# Patient Record
Sex: MALE | Race: WHITE | NOT HISPANIC OR LATINO | URBAN - METROPOLITAN AREA
[De-identification: names, ages, dates, MRNs, and addresses within clinical notes are randomized per-mention and may not be internally consistent; named-entity substitution may affect disease eponyms.]

---

## 2019-08-23 ENCOUNTER — EMERGENCY (EMERGENCY)
Facility: HOSPITAL | Age: 64
LOS: 1 days | Discharge: HOME | End: 2019-08-23
Attending: EMERGENCY MEDICINE | Admitting: EMERGENCY MEDICINE
Payer: MEDICARE

## 2019-08-23 VITALS
TEMPERATURE: 98 F | HEART RATE: 71 BPM | WEIGHT: 149.91 LBS | OXYGEN SATURATION: 93 % | RESPIRATION RATE: 16 BRPM | SYSTOLIC BLOOD PRESSURE: 113 MMHG | DIASTOLIC BLOOD PRESSURE: 74 MMHG

## 2019-08-23 PROCEDURE — 97597 DBRDMT OPN WND 1ST 20 CM/<: CPT

## 2019-08-23 PROCEDURE — 99283 EMERGENCY DEPT VISIT LOW MDM: CPT | Mod: 25,GC

## 2019-08-23 NOTE — ED PROVIDER NOTE - NSFOLLOWUPINSTRUCTIONS_ED_ALL_ED_FT
WASH YOUR HANDS NORMALLY WITH SOAP AND WATER    YOU CAN COVER WITH A BANDAID IF YOU WISH    YOU CAN CONTINUE TO USE THE MUPIROCIN YOU HAVE AT HOME OVER THE WOUND IF YOU WISH

## 2019-08-23 NOTE — ED PROCEDURE NOTE - PROCEDURE ADDITIONAL DETAILS
Performed a surgical debridement of a skin flap on the Right 2nd digit. No sutures placed. Pt tolerated procedure well.

## 2019-08-23 NOTE — ED PROVIDER NOTE - ATTENDING CONTRIBUTION TO CARE
63y m pw skin laceration s/p breaking glass yesterday.     afebrile, vss    tetanus utd  exam: several superficial abrasions with skin tear down 2nd digit of R hand, and one skin tear over the 2nd digit knuckle. FROM, distal nvi.    imp: superficial abrasions/skin tear  plan: surgical debridement, wound dressing.

## 2019-08-23 NOTE — ED PROVIDER NOTE - CLINICAL SUMMARY MEDICAL DECISION MAKING FREE TEXT BOX
63y m pw skin laceration s/p breaking glass yesterday. Pt has several superficial cuts down 2nd digit of R hand, and one skin tear over the 2nd digit knuckle. Pt still has full ROM and full strength. Will remove dangling flap from skin tear, and dc home w/ return precautions. Pt UTD on tetanus -Jacinto

## 2019-08-23 NOTE — ED PROVIDER NOTE - OBJECTIVE STATEMENT
63y m pw skin laceration s/p breaking glass yesterday. Pt has several superficial cuts down 2nd digit of R hand, and one skin tear over the 2nd digit knuckle. Pt still has full ROM and full strength.
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
## 2019-08-29 DIAGNOSIS — S61.210A LACERATION WITHOUT FOREIGN BODY OF RIGHT INDEX FINGER WITHOUT DAMAGE TO NAIL, INITIAL ENCOUNTER: ICD-10-CM
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
## 2019-08-29 DIAGNOSIS — Z88.0 ALLERGY STATUS TO PENICILLIN: ICD-10-CM

## 2022-03-17 NOTE — ED PROCEDURE NOTE - CPROC ED TIME OUT STATEMENT1
“Patient's name, , procedure and correct site were confirmed during the [facility] Timeout.” Attending Attestation (For Attendings USE Only)...

## 2022-04-04 ENCOUNTER — NON-APPOINTMENT (OUTPATIENT)
Age: 67
End: 2022-04-04

## 2022-04-04 PROBLEM — Z00.00 ENCOUNTER FOR PREVENTIVE HEALTH EXAMINATION: Status: ACTIVE | Noted: 2022-04-04

## 2022-04-05 ENCOUNTER — APPOINTMENT (OUTPATIENT)
Dept: OTOLARYNGOLOGY | Facility: CLINIC | Age: 67
End: 2022-04-05
Payer: MEDICARE

## 2022-04-05 ENCOUNTER — NON-APPOINTMENT (OUTPATIENT)
Age: 67
End: 2022-04-05

## 2022-04-05 VITALS
TEMPERATURE: 98 F | HEIGHT: 64 IN | SYSTOLIC BLOOD PRESSURE: 114 MMHG | HEART RATE: 69 BPM | WEIGHT: 142 LBS | DIASTOLIC BLOOD PRESSURE: 75 MMHG | OXYGEN SATURATION: 96 % | BODY MASS INDEX: 24.24 KG/M2 | RESPIRATION RATE: 16 BRPM

## 2022-04-05 DIAGNOSIS — Z78.9 OTHER SPECIFIED HEALTH STATUS: ICD-10-CM

## 2022-04-05 DIAGNOSIS — H93.8X3 OTHER SPECIFIED DISORDERS OF EAR, BILATERAL: ICD-10-CM

## 2022-04-05 DIAGNOSIS — Z85.46 PERSONAL HISTORY OF MALIGNANT NEOPLASM OF PROSTATE: ICD-10-CM

## 2022-04-05 DIAGNOSIS — H92.03 OTALGIA, BILATERAL: ICD-10-CM

## 2022-04-05 DIAGNOSIS — H61.23 IMPACTED CERUMEN, BILATERAL: ICD-10-CM

## 2022-04-05 PROCEDURE — 92550 TYMPANOMETRY & REFLEX THRESH: CPT

## 2022-04-05 PROCEDURE — 92557 COMPREHENSIVE HEARING TEST: CPT

## 2022-04-05 PROCEDURE — 99203 OFFICE O/P NEW LOW 30 MIN: CPT

## 2022-04-05 PROCEDURE — 69210 REMOVE IMPACTED EAR WAX UNI: CPT

## 2022-04-05 RX ORDER — FLUOXETINE HYDROCHLORIDE 40 MG/1
CAPSULE ORAL
Refills: 0 | Status: ACTIVE | COMMUNITY

## 2022-04-08 NOTE — HISTORY OF PRESENT ILLNESS
[de-identified] : 66 years old male patient with history of Persistent bilateral  clogged ears for the past couple of months.  Patient is present today in the office with Bilateral cerumen impaction.

## 2022-04-08 NOTE — PROCEDURE
[Cerumen Impaction] : Cerumen Impaction [] : Removal of Cerumen [FreeTextEntry5] : Payne suction # 5, Ear curette, Ear Alligator

## 2022-04-08 NOTE — PHYSICAL EXAM
[Normal] : mucosa is normal [Midline] : trachea located in midline position [de-identified] : Bilateral cerumen impaction.

## 2022-04-08 NOTE — REASON FOR VISIT
[Initial Evaluation] : an initial evaluation for [FreeTextEntry2] : Persistent bilateral  clogged ears for the past couple of months.  Patient states his level of severity is a level 8 out of 10 and it occurs constant.  Patient states nothing helps to improve or worsens his Persistent bilateral  clogged ears for the past couple of months

## 2022-04-08 NOTE — CONSULT LETTER
[Dear  ___] : Dear  [unfilled], [Consult Letter:] : I had the pleasure of evaluating your patient, [unfilled]. [Please see my note below.] : Please see my note below. [Consult Closing:] : Thank you very much for allowing me to participate in the care of this patient.  If you have any questions, please do not hesitate to contact me. [Sincerely,] : Sincerely, [FreeTextEntry3] : Henri Welch MD, FACS\par Professor of Otolaryngology, [facility] [employer] [employer] at [facility]\par Director, [facility], Department of Otolaryngology, [facility]\par , Head & Neck Service Line, [facility]\par

## 2023-01-19 ENCOUNTER — EMERGENCY (EMERGENCY)
Facility: HOSPITAL | Age: 68
LOS: 1 days | Discharge: HOME | End: 2023-01-19
Attending: EMERGENCY MEDICINE | Admitting: EMERGENCY MEDICINE
Payer: MEDICARE

## 2023-01-19 VITALS
SYSTOLIC BLOOD PRESSURE: 130 MMHG | DIASTOLIC BLOOD PRESSURE: 72 MMHG | HEART RATE: 70 BPM | RESPIRATION RATE: 16 BRPM | TEMPERATURE: 98 F | OXYGEN SATURATION: 98 %

## 2023-01-19 VITALS
RESPIRATION RATE: 16 BRPM | DIASTOLIC BLOOD PRESSURE: 67 MMHG | SYSTOLIC BLOOD PRESSURE: 118 MMHG | HEIGHT: 64 IN | OXYGEN SATURATION: 100 % | WEIGHT: 149.03 LBS | HEART RATE: 71 BPM | TEMPERATURE: 100 F

## 2023-01-19 LAB
ALBUMIN SERPL ELPH-MCNC: 4.3 G/DL — SIGNIFICANT CHANGE UP (ref 3.4–5)
ALP SERPL-CCNC: 122 U/L — HIGH (ref 40–120)
ALT FLD-CCNC: 26 U/L — SIGNIFICANT CHANGE UP (ref 12–42)
ANION GAP SERPL CALC-SCNC: 9 MMOL/L — SIGNIFICANT CHANGE UP (ref 9–16)
APPEARANCE UR: CLEAR — SIGNIFICANT CHANGE UP
APTT BLD: 34.3 SEC — SIGNIFICANT CHANGE UP (ref 27.5–35.5)
AST SERPL-CCNC: 22 U/L — SIGNIFICANT CHANGE UP (ref 15–37)
BASOPHILS # BLD AUTO: 0 K/UL — SIGNIFICANT CHANGE UP (ref 0–0.2)
BASOPHILS NFR BLD AUTO: 0 % — SIGNIFICANT CHANGE UP (ref 0–2)
BILIRUB SERPL-MCNC: 0.3 MG/DL — SIGNIFICANT CHANGE UP (ref 0.2–1.2)
BILIRUB UR-MCNC: NEGATIVE — SIGNIFICANT CHANGE UP
BUN SERPL-MCNC: 14 MG/DL — SIGNIFICANT CHANGE UP (ref 7–23)
CALCIUM SERPL-MCNC: 8.9 MG/DL — SIGNIFICANT CHANGE UP (ref 8.5–10.5)
CHLORIDE SERPL-SCNC: 94 MMOL/L — LOW (ref 96–108)
CO2 SERPL-SCNC: 28 MMOL/L — SIGNIFICANT CHANGE UP (ref 22–31)
COLOR SPEC: YELLOW — SIGNIFICANT CHANGE UP
CREAT SERPL-MCNC: 0.91 MG/DL — SIGNIFICANT CHANGE UP (ref 0.5–1.3)
DIFF PNL FLD: NEGATIVE — SIGNIFICANT CHANGE UP
EGFR: 91 ML/MIN/1.73M2 — SIGNIFICANT CHANGE UP
EOSINOPHIL # BLD AUTO: 0.1 K/UL — SIGNIFICANT CHANGE UP (ref 0–0.5)
EOSINOPHIL NFR BLD AUTO: 1 % — SIGNIFICANT CHANGE UP (ref 0–6)
FLUAV AG NPH QL: SIGNIFICANT CHANGE UP
FLUAV H1 2009 PAND RNA SPEC QL NAA+PROBE: SIGNIFICANT CHANGE UP
FLUAV H1 RNA SPEC QL NAA+PROBE: SIGNIFICANT CHANGE UP
FLUAV H3 RNA SPEC QL NAA+PROBE: SIGNIFICANT CHANGE UP
FLUAV SUBTYP SPEC NAA+PROBE: SIGNIFICANT CHANGE UP
FLUBV AG NPH QL: SIGNIFICANT CHANGE UP
FLUBV RNA SPEC QL NAA+PROBE: SIGNIFICANT CHANGE UP
GLUCOSE SERPL-MCNC: 114 MG/DL — HIGH (ref 70–99)
GLUCOSE UR QL: NEGATIVE — SIGNIFICANT CHANGE UP
HCOV PNL SPEC NAA+PROBE: DETECTED
HCT VFR BLD CALC: 39.7 % — SIGNIFICANT CHANGE UP (ref 39–50)
HGB BLD-MCNC: 13.5 G/DL — SIGNIFICANT CHANGE UP (ref 13–17)
HIV 1 & 2 AB SERPL IA.RAPID: SIGNIFICANT CHANGE UP
INR BLD: 1.08 — SIGNIFICANT CHANGE UP (ref 0.88–1.16)
KETONES UR-MCNC: NEGATIVE — SIGNIFICANT CHANGE UP
LACTATE SERPL-SCNC: 0.9 MMOL/L — SIGNIFICANT CHANGE UP (ref 0.4–2)
LEUKOCYTE ESTERASE UR-ACNC: NEGATIVE — SIGNIFICANT CHANGE UP
LYMPHOCYTES # BLD AUTO: 0.79 K/UL — LOW (ref 1–3.3)
LYMPHOCYTES # BLD AUTO: 8 % — LOW (ref 13–44)
MANUAL SMEAR VERIFICATION: SIGNIFICANT CHANGE UP
MCHC RBC-ENTMCNC: 29.2 PG — SIGNIFICANT CHANGE UP (ref 27–34)
MCHC RBC-ENTMCNC: 34 GM/DL — SIGNIFICANT CHANGE UP (ref 32–36)
MCV RBC AUTO: 85.7 FL — SIGNIFICANT CHANGE UP (ref 80–100)
MONOCYTES # BLD AUTO: 0.79 K/UL — SIGNIFICANT CHANGE UP (ref 0–0.9)
MONOCYTES NFR BLD AUTO: 8 % — SIGNIFICANT CHANGE UP (ref 2–14)
NEUTROPHILS # BLD AUTO: 7.91 K/UL — HIGH (ref 1.8–7.4)
NEUTROPHILS NFR BLD AUTO: 78 % — HIGH (ref 43–77)
NEUTS BAND # BLD: 2 % — SIGNIFICANT CHANGE UP (ref 0–8)
NITRITE UR-MCNC: NEGATIVE — SIGNIFICANT CHANGE UP
NRBC # BLD: 0 /100 — SIGNIFICANT CHANGE UP (ref 0–0)
NRBC # BLD: SIGNIFICANT CHANGE UP /100 WBCS (ref 0–0)
NT-PROBNP SERPL-SCNC: 235 PG/ML — SIGNIFICANT CHANGE UP
PH UR: 8.5 — HIGH (ref 5–8)
PLAT MORPH BLD: NORMAL — SIGNIFICANT CHANGE UP
PLATELET # BLD AUTO: 224 K/UL — SIGNIFICANT CHANGE UP (ref 150–400)
POTASSIUM SERPL-MCNC: 4.6 MMOL/L — SIGNIFICANT CHANGE UP (ref 3.5–5.3)
POTASSIUM SERPL-SCNC: 4.6 MMOL/L — SIGNIFICANT CHANGE UP (ref 3.5–5.3)
PROT SERPL-MCNC: 7.4 G/DL — SIGNIFICANT CHANGE UP (ref 6.4–8.2)
PROT UR-MCNC: NEGATIVE MG/DL — SIGNIFICANT CHANGE UP
PROTHROM AB SERPL-ACNC: 12.5 SEC — SIGNIFICANT CHANGE UP (ref 10.5–13.4)
RAPID RVP RESULT: DETECTED
RBC # BLD: 4.63 M/UL — SIGNIFICANT CHANGE UP (ref 4.2–5.8)
RBC # FLD: 13.1 % — SIGNIFICANT CHANGE UP (ref 10.3–14.5)
RBC BLD AUTO: NORMAL — SIGNIFICANT CHANGE UP
RSV RNA NPH QL NAA+NON-PROBE: SIGNIFICANT CHANGE UP
S PYO AG SPEC QL IA: NEGATIVE — SIGNIFICANT CHANGE UP
SARS-COV-2 RNA SPEC QL NAA+PROBE: SIGNIFICANT CHANGE UP
SARS-COV-2 RNA SPEC QL NAA+PROBE: SIGNIFICANT CHANGE UP
SODIUM SERPL-SCNC: 131 MMOL/L — LOW (ref 132–145)
SP GR SPEC: 1.01 — SIGNIFICANT CHANGE UP (ref 1–1.03)
TROPONIN I, HIGH SENSITIVITY RESULT: 7.1 NG/L — SIGNIFICANT CHANGE UP
UROBILINOGEN FLD QL: 0.2 E.U./DL — SIGNIFICANT CHANGE UP
VARIANT LYMPHS # BLD: 3 % — SIGNIFICANT CHANGE UP (ref 0–6)
WBC # BLD: 9.89 K/UL — SIGNIFICANT CHANGE UP (ref 3.8–10.5)
WBC # FLD AUTO: 9.89 K/UL — SIGNIFICANT CHANGE UP (ref 3.8–10.5)

## 2023-01-19 PROCEDURE — 71045 X-RAY EXAM CHEST 1 VIEW: CPT | Mod: 26

## 2023-01-19 PROCEDURE — 99285 EMERGENCY DEPT VISIT HI MDM: CPT | Mod: CS

## 2023-01-19 RX ORDER — CEFTRIAXONE 500 MG/1
1000 INJECTION, POWDER, FOR SOLUTION INTRAMUSCULAR; INTRAVENOUS ONCE
Refills: 0 | Status: COMPLETED | OUTPATIENT
Start: 2023-01-19 | End: 2023-01-19

## 2023-01-19 RX ORDER — AZITHROMYCIN 500 MG/1
1 TABLET, FILM COATED ORAL
Qty: 4 | Refills: 0
Start: 2023-01-19 | End: 2023-01-22

## 2023-01-19 RX ORDER — AZITHROMYCIN 500 MG/1
500 TABLET, FILM COATED ORAL ONCE
Refills: 0 | Status: COMPLETED | OUTPATIENT
Start: 2023-01-19 | End: 2023-01-19

## 2023-01-19 RX ORDER — ACETAMINOPHEN 500 MG
650 TABLET ORAL ONCE
Refills: 0 | Status: COMPLETED | OUTPATIENT
Start: 2023-01-19 | End: 2023-01-19

## 2023-01-19 RX ORDER — SODIUM CHLORIDE 9 MG/ML
1850 INJECTION INTRAMUSCULAR; INTRAVENOUS; SUBCUTANEOUS ONCE
Refills: 0 | Status: COMPLETED | OUTPATIENT
Start: 2023-01-19 | End: 2023-01-19

## 2023-01-19 RX ADMIN — SODIUM CHLORIDE 1850 MILLILITER(S): 9 INJECTION INTRAMUSCULAR; INTRAVENOUS; SUBCUTANEOUS at 11:12

## 2023-01-19 RX ADMIN — Medication 650 MILLIGRAM(S): at 12:40

## 2023-01-19 RX ADMIN — AZITHROMYCIN 255 MILLIGRAM(S): 500 TABLET, FILM COATED ORAL at 11:12

## 2023-01-19 RX ADMIN — Medication 650 MILLIGRAM(S): at 11:11

## 2023-01-19 NOTE — ED PROVIDER NOTE - IV ALTEPLASE ADMIN OUTSIDE HIDDEN
- Patient presented with  elevated creatinine level 1.51 ,   - Baseline creatine - 1.5. as per records from [facility]. show

## 2023-01-19 NOTE — ED ADULT TRIAGE NOTE - CHIEF COMPLAINT QUOTE
Pt walked in with c/o fever since this morning. Reports sore throat since yesterday. Breathing even and unlabored.

## 2023-01-19 NOTE — ED PROVIDER NOTE - OBJECTIVE STATEMENT
66 y/o M with PMH of prostate CA presents to the ED for evaluation. Pt reports he developed a sore throat yesterday. This morning, he measured a fever of T-max 102. He endorses coughs and rhinorrhea. Pt denies CP or shortness of breath. Of note, Pt has allergies to Penicillin.

## 2023-01-19 NOTE — ED PROVIDER NOTE - NSFOLLOWUPINSTRUCTIONS_ED_ALL_ED_FT
Please follow up with your primary care doctor.  Please return at any time if you have any concerns.  Your blood and viral swab and Xray test results are attached.

## 2023-01-19 NOTE — ED ADULT NURSE REASSESSMENT NOTE - NS ED NURSE REASSESS COMMENT FT1
Patient states that the is feeling better, patient is able to swallow without too much difficulty compared to initial presentation.

## 2023-01-19 NOTE — ED PROVIDER NOTE - CLINICAL SUMMARY MEDICAL DECISION MAKING FREE TEXT BOX
66 y/o M presenting with fevers, coughs, and sore throat. On exam, Pt appears well and in no acute distress. Plan for EKG, Chest XR, blood work, and Levaquin. Will reassess clinically after results have been obtained.

## 2023-01-19 NOTE — ED ADULT NURSE NOTE - OBJECTIVE STATEMENT
Patient states that two days prior to they were having trouble swallowing, it improved yesterday, today the patients states that they are having trouble swallowing. Patient states that they have been vaccinated for the Flu this year. Patient is AOx4, ambulates with a steady gait.

## 2023-01-19 NOTE — ED PROVIDER NOTE - PATIENT PORTAL LINK FT
You can access the FollowMyHealth Patient Portal offered by [facility] by registering at the following website: [email or web address]. By joining [employer]’s FollowMyHealth portal, you will also be able to view your health information using other applications (apps) compatible with our system.

## 2023-01-20 LAB
CULTURE RESULTS: SIGNIFICANT CHANGE UP
SPECIMEN SOURCE: SIGNIFICANT CHANGE UP

## 2023-01-21 LAB
CULTURE RESULTS: SIGNIFICANT CHANGE UP
SPECIMEN SOURCE: SIGNIFICANT CHANGE UP

## 2023-01-22 DIAGNOSIS — R05.9 COUGH, UNSPECIFIED: ICD-10-CM

## 2023-01-22 DIAGNOSIS — Z88.0 ALLERGY STATUS TO PENICILLIN: ICD-10-CM

## 2023-01-22 DIAGNOSIS — J02.9 ACUTE PHARYNGITIS, UNSPECIFIED: ICD-10-CM

## 2023-01-22 DIAGNOSIS — Z20.822 CONTACT WITH AND (SUSPECTED) EXPOSURE TO COVID-19: ICD-10-CM

## 2023-01-22 DIAGNOSIS — Z85.46 PERSONAL HISTORY OF MALIGNANT NEOPLASM OF PROSTATE: ICD-10-CM

## 2023-01-22 LAB
-  FUSOBACTERIUM NUCLEATUM: SIGNIFICANT CHANGE UP
GRAM STN FLD: SIGNIFICANT CHANGE UP
METHOD TYPE: SIGNIFICANT CHANGE UP

## 2023-01-22 NOTE — ED POST DISCHARGE NOTE - DETAILS
left message to callback. contacted on 1/23/2023 patient to return to ED for repeat testing. patient returned for repeat blood cultures, neg to date, pending final results.

## 2023-01-23 ENCOUNTER — EMERGENCY (EMERGENCY)
Facility: HOSPITAL | Age: 68
LOS: 1 days | Discharge: HOME | End: 2023-01-23
Admitting: EMERGENCY MEDICINE
Payer: MEDICARE

## 2023-01-23 VITALS
DIASTOLIC BLOOD PRESSURE: 75 MMHG | SYSTOLIC BLOOD PRESSURE: 122 MMHG | TEMPERATURE: 98 F | RESPIRATION RATE: 17 BRPM | WEIGHT: 145.95 LBS | HEIGHT: 64 IN | HEART RATE: 68 BPM | OXYGEN SATURATION: 98 %

## 2023-01-23 PROBLEM — C61 MALIGNANT NEOPLASM OF PROSTATE: Chronic | Status: ACTIVE | Noted: 2023-01-19

## 2023-01-23 LAB
ALBUMIN SERPL ELPH-MCNC: 4.2 G/DL — SIGNIFICANT CHANGE UP (ref 3.4–5)
ALP SERPL-CCNC: 111 U/L — SIGNIFICANT CHANGE UP (ref 40–120)
ALT FLD-CCNC: 30 U/L — SIGNIFICANT CHANGE UP (ref 12–42)
ANION GAP SERPL CALC-SCNC: 8 MMOL/L — LOW (ref 9–16)
AST SERPL-CCNC: 27 U/L — SIGNIFICANT CHANGE UP (ref 15–37)
BASOPHILS # BLD AUTO: 0.05 K/UL — SIGNIFICANT CHANGE UP (ref 0–0.2)
BASOPHILS NFR BLD AUTO: 0.6 % — SIGNIFICANT CHANGE UP (ref 0–2)
BILIRUB SERPL-MCNC: 0.3 MG/DL — SIGNIFICANT CHANGE UP (ref 0.2–1.2)
BUN SERPL-MCNC: 16 MG/DL — SIGNIFICANT CHANGE UP (ref 7–23)
CALCIUM SERPL-MCNC: 9.3 MG/DL — SIGNIFICANT CHANGE UP (ref 8.5–10.5)
CHLORIDE SERPL-SCNC: 93 MMOL/L — LOW (ref 96–108)
CO2 SERPL-SCNC: 30 MMOL/L — SIGNIFICANT CHANGE UP (ref 22–31)
CREAT SERPL-MCNC: 0.98 MG/DL — SIGNIFICANT CHANGE UP (ref 0.5–1.3)
EGFR: 83 ML/MIN/1.73M2 — SIGNIFICANT CHANGE UP
EOSINOPHIL # BLD AUTO: 0.15 K/UL — SIGNIFICANT CHANGE UP (ref 0–0.5)
EOSINOPHIL NFR BLD AUTO: 1.8 % — SIGNIFICANT CHANGE UP (ref 0–6)
GLUCOSE SERPL-MCNC: 92 MG/DL — SIGNIFICANT CHANGE UP (ref 70–99)
HCT VFR BLD CALC: 39.5 % — SIGNIFICANT CHANGE UP (ref 39–50)
HGB BLD-MCNC: 13.6 G/DL — SIGNIFICANT CHANGE UP (ref 13–17)
IMM GRANULOCYTES NFR BLD AUTO: 0.6 % — SIGNIFICANT CHANGE UP (ref 0–0.9)
LYMPHOCYTES # BLD AUTO: 1.31 K/UL — SIGNIFICANT CHANGE UP (ref 1–3.3)
LYMPHOCYTES # BLD AUTO: 15.4 % — SIGNIFICANT CHANGE UP (ref 13–44)
MCHC RBC-ENTMCNC: 29.4 PG — SIGNIFICANT CHANGE UP (ref 27–34)
MCHC RBC-ENTMCNC: 34.4 GM/DL — SIGNIFICANT CHANGE UP (ref 32–36)
MCV RBC AUTO: 85.3 FL — SIGNIFICANT CHANGE UP (ref 80–100)
MONOCYTES # BLD AUTO: 1.22 K/UL — HIGH (ref 0–0.9)
MONOCYTES NFR BLD AUTO: 14.4 % — HIGH (ref 2–14)
NEUTROPHILS # BLD AUTO: 5.71 K/UL — SIGNIFICANT CHANGE UP (ref 1.8–7.4)
NEUTROPHILS NFR BLD AUTO: 67.2 % — SIGNIFICANT CHANGE UP (ref 43–77)
NRBC # BLD: 0 /100 WBCS — SIGNIFICANT CHANGE UP (ref 0–0)
PLATELET # BLD AUTO: 229 K/UL — SIGNIFICANT CHANGE UP (ref 150–400)
POTASSIUM SERPL-MCNC: 4.9 MMOL/L — SIGNIFICANT CHANGE UP (ref 3.5–5.3)
POTASSIUM SERPL-SCNC: 4.9 MMOL/L — SIGNIFICANT CHANGE UP (ref 3.5–5.3)
PROT SERPL-MCNC: 7.4 G/DL — SIGNIFICANT CHANGE UP (ref 6.4–8.2)
RBC # BLD: 4.63 M/UL — SIGNIFICANT CHANGE UP (ref 4.2–5.8)
RBC # FLD: 12.8 % — SIGNIFICANT CHANGE UP (ref 10.3–14.5)
SODIUM SERPL-SCNC: 131 MMOL/L — LOW (ref 132–145)
WBC # BLD: 8.49 K/UL — SIGNIFICANT CHANGE UP (ref 3.8–10.5)
WBC # FLD AUTO: 8.49 K/UL — SIGNIFICANT CHANGE UP (ref 3.8–10.5)

## 2023-01-23 PROCEDURE — 99284 EMERGENCY DEPT VISIT MOD MDM: CPT

## 2023-01-23 RX ORDER — LEVOFLOXACIN 5 MG/ML
1 INJECTION, SOLUTION INTRAVENOUS
Qty: 10 | Refills: 0
Start: 2023-01-23 | End: 2023-02-01

## 2023-01-23 NOTE — ED PROVIDER NOTE - PATIENT PORTAL LINK FT
You can access the FollowMyHealth Patient Portal offered by [facility] by registering at the following website: [email or web address]. By joining [employer]’s FollowMyHealth portal, you will also be able to view your health information using other applications (apps) compatible with our system.

## 2023-01-23 NOTE — ED PROVIDER NOTE - OBJECTIVE STATEMENT
66 y/o M with PMH of prostate CA presents to the ED for re-evaluation. Pt was seen at this ED a few days ago for URI symptoms. Pt received a call back regarding an abnormal result in his blood work. Pt returned to the ED as instructed. He states his URI symptoms are improving and only endorses fatigue.

## 2023-01-23 NOTE — ED PROVIDER NOTE - CLINICAL SUMMARY MEDICAL DECISION MAKING FREE TEXT BOX
68 y/o M presents to the ED for repeat blood work. Will repeat CBC and blood cultures. Pt states he is feeling better overall. URI symptoms are resolving. Will continue to monitor.

## 2023-01-23 NOTE — ED PROVIDER NOTE - CARDIAC, MLM
Fanta stated if Patient is a Type2 diabetic, insurance won't cover them for this if it is.  Please call Fanta if he is a type 1.  Otherwise she will disregard request.  She is sorry but cannot process paperwork.  Thank you.   Normal rate, regular rhythm.  Heart sounds S1, S2.  No murmurs, rubs or gallops.

## 2023-01-24 LAB
CULTURE RESULTS: SIGNIFICANT CHANGE UP
CULTURE RESULTS: SIGNIFICANT CHANGE UP
ORGANISM # SPEC MICROSCOPIC CNT: SIGNIFICANT CHANGE UP
ORGANISM # SPEC MICROSCOPIC CNT: SIGNIFICANT CHANGE UP
SPECIMEN SOURCE: SIGNIFICANT CHANGE UP
SPECIMEN SOURCE: SIGNIFICANT CHANGE UP

## 2023-01-25 DIAGNOSIS — Z85.46 PERSONAL HISTORY OF MALIGNANT NEOPLASM OF PROSTATE: ICD-10-CM

## 2023-01-25 DIAGNOSIS — R53.83 OTHER FATIGUE: ICD-10-CM

## 2023-01-25 DIAGNOSIS — Z88.0 ALLERGY STATUS TO PENICILLIN: ICD-10-CM

## 2023-01-25 DIAGNOSIS — Z01.89 ENCOUNTER FOR OTHER SPECIFIED SPECIAL EXAMINATIONS: ICD-10-CM

## 2023-01-29 LAB
CULTURE RESULTS: SIGNIFICANT CHANGE UP
CULTURE RESULTS: SIGNIFICANT CHANGE UP
SPECIMEN SOURCE: SIGNIFICANT CHANGE UP
SPECIMEN SOURCE: SIGNIFICANT CHANGE UP

## 2023-03-30 ENCOUNTER — EMERGENCY (EMERGENCY)
Facility: HOSPITAL | Age: 68
LOS: 1 days | Discharge: HOME | End: 2023-03-30
Attending: EMERGENCY MEDICINE | Admitting: EMERGENCY MEDICINE
Payer: MEDICARE

## 2023-03-30 VITALS
WEIGHT: 149.91 LBS | TEMPERATURE: 98 F | DIASTOLIC BLOOD PRESSURE: 80 MMHG | OXYGEN SATURATION: 98 % | HEART RATE: 77 BPM | RESPIRATION RATE: 17 BRPM | SYSTOLIC BLOOD PRESSURE: 152 MMHG

## 2023-03-30 DIAGNOSIS — S32.502A UNSPECIFIED FRACTURE OF LEFT PUBIS, INITIAL ENCOUNTER FOR CLOSED FRACTURE: ICD-10-CM

## 2023-03-30 DIAGNOSIS — Z88.0 ALLERGY STATUS TO PENICILLIN: ICD-10-CM

## 2023-03-30 DIAGNOSIS — M25.552 PAIN IN LEFT HIP: ICD-10-CM
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
## 2023-03-30 DIAGNOSIS — Z85.46 PERSONAL HISTORY OF MALIGNANT NEOPLASM OF PROSTATE: ICD-10-CM
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
## 2023-03-30 PROCEDURE — 72192 CT PELVIS W/O DYE: CPT | Mod: 26,MH

## 2023-03-30 PROCEDURE — 99284 EMERGENCY DEPT VISIT MOD MDM: CPT

## 2023-03-30 PROCEDURE — 73502 X-RAY EXAM HIP UNI 2-3 VIEWS: CPT | Mod: 26,LT

## 2023-04-11 ENCOUNTER — EMERGENCY (EMERGENCY)
Facility: HOSPITAL | Age: 68
LOS: 1 days | Discharge: HOME | End: 2023-04-11
Attending: EMERGENCY MEDICINE | Admitting: EMERGENCY MEDICINE
Payer: MEDICARE

## 2023-04-11 VITALS
TEMPERATURE: 99 F | SYSTOLIC BLOOD PRESSURE: 125 MMHG | RESPIRATION RATE: 18 BRPM | HEART RATE: 70 BPM | OXYGEN SATURATION: 95 % | DIASTOLIC BLOOD PRESSURE: 72 MMHG

## 2023-04-11 VITALS
TEMPERATURE: 98 F | SYSTOLIC BLOOD PRESSURE: 161 MMHG | DIASTOLIC BLOOD PRESSURE: 69 MMHG | OXYGEN SATURATION: 99 % | HEART RATE: 63 BPM | RESPIRATION RATE: 17 BRPM

## 2023-04-11 DIAGNOSIS — Z88.0 ALLERGY STATUS TO PENICILLIN: ICD-10-CM

## 2023-04-11 DIAGNOSIS — Z85.46 PERSONAL HISTORY OF MALIGNANT NEOPLASM OF PROSTATE: ICD-10-CM

## 2023-04-11 DIAGNOSIS — R10.2 PELVIC AND PERINEAL PAIN: ICD-10-CM

## 2023-04-11 DIAGNOSIS — M84.48XA PATHOLOGICAL FRACTURE, OTHER SITE, INITIAL ENCOUNTER FOR FRACTURE: ICD-10-CM

## 2023-04-11 PROCEDURE — 99284 EMERGENCY DEPT VISIT MOD MDM: CPT

## 2023-04-11 PROCEDURE — 72192 CT PELVIS W/O DYE: CPT | Mod: 26,MH

## 2023-04-11 RX ORDER — IBUPROFEN 200 MG
800 TABLET ORAL ONCE
Refills: 0 | Status: COMPLETED | OUTPATIENT
Start: 2023-04-11 | End: 2023-04-11

## 2023-04-11 RX ORDER — MORPHINE SULFATE 50 MG/1
6 CAPSULE, EXTENDED RELEASE ORAL ONCE
Refills: 0 | Status: DISCONTINUED | OUTPATIENT
Start: 2023-04-11 | End: 2023-04-11

## 2023-04-11 RX ORDER — HYDROCODONE BITARTRATE AND ACETAMINOPHEN 7.5; 325 MG/15ML; MG/15ML
1 SOLUTION ORAL
Qty: 20 | Refills: 0
Start: 2023-04-11 | End: 2023-04-15

## 2023-04-11 RX ADMIN — MORPHINE SULFATE 6 MILLIGRAM(S): 50 CAPSULE, EXTENDED RELEASE ORAL at 14:26

## 2023-04-11 RX ADMIN — Medication 800 MILLIGRAM(S): at 14:23

## 2023-04-11 NOTE — ED PROVIDER NOTE - CLINICAL SUMMARY MEDICAL DECISION MAKING FREE TEXT BOX
Discussed CT findings of new sacral insufficiency fracture with orthopedics Dr. Dupree.  We had a brief discussion over the phone.  Patient can be decreased weightbearing as tolerated.  Will use crutches and walker at home.  Patient has his own orthopedic doctor to follow-up with.  Pain controlled in ED with morphine and ibuprofen.  Patient states that Percocet 10 mg does not help with his pain, will try hydrocodone instead.

## 2023-04-11 NOTE — ED PROVIDER NOTE - OBJECTIVE STATEMENT
67-year-old male with history of prostate cancer, seen here approximately 1 week ago after a fall.  Was diagnosed with left pubic ramus fracture and sent home with Percocet and ibuprofen for pain.  Patient has been ambulating as tolerated, after 3 to 4 days he felt strong, his pain was controlled with minimal pain medication at home and he had a day of walking and stretching and exercising.  2 to 3 days after this day he felt gradual onset of right-sided lower pelvic pain, difficulty with ambulating and pain control was poor despite Percocet x2 and ibuprofen 600 mg every 6 hours.  He states he is having difficulty ambulating on his own.  Patient lives in 1/5 floor walk up and needed crutches to get up to his apartment.  He was using a walker to get around his apartment but the walker broke.  No head trauma, no recurrence of fall, no new injuries.

## 2023-04-11 NOTE — ED ADULT NURSE NOTE - OBJECTIVE STATEMENT
Pt came in c/o of increasing hip pain x 1 week. Pt reports having a mechanical trip and fall last week where he fractured his left hip. Pt states he was provided instructions to walk more on the hip and put pressure on it. Pt reports walking a lot recently and developed more pain. PT denies head strike or loc on initial fall and denies new trauma to the hip. A&Ox3 speaking in full sentences.

## 2023-04-11 NOTE — ED ADULT TRIAGE NOTE - CHIEF COMPLAINT QUOTE
pt biba for exacerbation of left hip pain. pt was told that he had a stable fracture one week ago, which would not require surgical intervention.

## 2023-04-11 NOTE — ED PROVIDER NOTE - PATIENT PORTAL LINK FT
You can access the FollowMyHealth Patient Portal offered by [facility] by registering at the following website: [email or web address]. By joining [employer]’s FollowMyHealth portal, you will also be able to view your health information using other applications (apps) compatible with our system.

## 2023-04-11 NOTE — ED PROVIDER NOTE - PHYSICAL EXAMINATION
VSS in NAD non toxic appearing   NCAT EOMI PERRL OP clear  abd soft NT ND no CVAT no guarding no rebound   pelvis stable, FROM B/L hips   normal neuro exam CN I-XII grossly intact, no groos motor or sensory deficits  no peripheral c/c/e

## 2023-08-05 ENCOUNTER — EMERGENCY (EMERGENCY)
Facility: HOSPITAL | Age: 68
LOS: 1 days | Discharge: HOME | End: 2023-08-05
Attending: EMERGENCY MEDICINE | Admitting: EMERGENCY MEDICINE
Payer: MEDICARE

## 2023-08-05 VITALS
DIASTOLIC BLOOD PRESSURE: 69 MMHG | RESPIRATION RATE: 18 BRPM | HEART RATE: 68 BPM | OXYGEN SATURATION: 96 % | SYSTOLIC BLOOD PRESSURE: 108 MMHG | TEMPERATURE: 98 F | WEIGHT: 169.98 LBS

## 2023-08-05 PROCEDURE — 10061 I&D ABSCESS COMP/MULTIPLE: CPT

## 2023-08-05 PROCEDURE — 99283 EMERGENCY DEPT VISIT LOW MDM: CPT | Mod: 25

## 2023-08-05 RX ORDER — LIDOCAINE HYDROCHLORIDE AND EPINEPHRINE 10; 10 MG/ML; UG/ML
6 INJECTION, SOLUTION INFILTRATION; PERINEURAL ONCE
Refills: 0 | Status: COMPLETED | OUTPATIENT
Start: 2023-08-05 | End: 2023-08-05

## 2023-08-05 RX ADMIN — Medication 1 TABLET(S): at 16:37

## 2023-08-05 RX ADMIN — LIDOCAINE HYDROCHLORIDE AND EPINEPHRINE 6 MILLILITER(S): 10; 10 INJECTION, SOLUTION INFILTRATION; PERINEURAL at 16:53

## 2023-08-05 NOTE — ED PROVIDER NOTE - PATIENT PORTAL LINK FT
You can access the FollowMyHealth Patient Portal offered by [facility] by registering at the following website: [email or web address]. By joining [employer]’s FollowMyHealth portal, you will also be able to view your health information using other applications (apps) compatible with our system.

## 2023-08-05 NOTE — ED PROCEDURE NOTE - CPROC ED COMPLICATIONS1











Bonita Recinos MD Jan 6, 2018 18:18 no

## 2023-08-05 NOTE — ED PROVIDER NOTE - DISPOSITION TYPE
Pt  Was assessed and discharged by [facility]ING[facility] Pike RN  01/30/18 9423 DISCHARGE

## 2023-08-05 NOTE — ED PROVIDER NOTE - OBJECTIVE STATEMENT
66 yo male presents with shoulder wound x2 weeks. Pt denies any injury prior to noticing the wound and reports it has been unchanging for the past 2 weeks with reoccurring puss. He also denies history of diabetes or current antibiotic use.

## 2023-08-05 NOTE — ED PROVIDER NOTE - ATTESTATION, MLM
The procedure was performed independently
The procedure was performed independently
I have reviewed and confirmed nurses' notes for patient's medications, allergies, medical history, and surgical history.

## 2023-08-05 NOTE — ED ADULT NURSE NOTE - NSFALLRISKINTERV_ED_ALL_ED

## 2023-08-05 NOTE — ED PROVIDER NOTE - NSFOLLOWUPINSTRUCTIONS_ED_ALL_ED_FT
Abscess Incision and Drainage    WHAT YOU NEED TO KNOW:    An abscess incision and drainage (I and D) is a procedure to drain pus from an abscess and clean it out so it can heal.    DISCHARGE INSTRUCTIONS:    Contact your healthcare provider if:    The area around your abscess has red streaks or is warm and painful.    You have a fever or chills.    You have increased redness, swelling, or pain in your wound.    Your wound does not start to heal after a few days.    Your abscess returns.    You have questions or concerns about your condition or care.  Medicines:    NSAIDs, such as ibuprofen, help decrease swelling, pain, and fever. NSAIDs can cause stomach bleeding or kidney problems in certain people. If you take blood thinner medicine, always ask your healthcare provider if NSAIDs are safe for you. Always read the medicine label and follow directions.    Take your medicine as directed. Contact your healthcare provider if you think your medicine is not helping or if you have side effects. Tell your provider if you are allergic to any medicine. Keep a list of the medicines, vitamins, and herbs you take. Include the amounts, and when and why you take them. Bring the list or the pill bottles to follow-up visits. Carry your medicine list with you in case of an emergency.  Care for your wound as directed:    Do not remove your bandage unless your healthcare provider says it is okay. Keep the bandage clean and dry. Remove your bandage and clean the wound once your healthcare provider gives you directions.    Apply heat on the bandage over your wound for 20 to 30 minutes every 2 hours for as many days as directed. This will increase blood flow to the area and help it heal.    Elevate your wound above level of your heart as often as you can. This will help decrease swelling and pain. Prop your wounded area on pillows or blankets to keep it elevated comfortably.  Follow up with your healthcare provider as directed: You may need to return in 1 to 3 days to have the gauze in your wound removed and your wound examined. You may be taught how to change the gauze in your wound. Write down your questions so you remember to ask them during your visits.

## 2023-08-05 NOTE — ED PROVIDER NOTE - WET READ LAUNCH FT
[Total Preg ___] : : [unfilled] [Full Term ___] : [unfilled] (full-term) [Vaginal ___] : [unfilled] vaginal delivery(s) There are no Wet Read(s) to document.

## 2023-08-05 NOTE — ED PROVIDER NOTE - CLINICAL SUMMARY MEDICAL DECISION MAKING FREE TEXT BOX
68 yo male presents with 3x3 fluctuant mass over left shoulder with central scab and green discharge upon palpitation. Will prescribe Bactrim, perform I&D, and advise patient for reassessment in 3/4 days. 68 yo male presents with 3x3 fluctuant mass over left shoulder with central scab and green discharge upon palpitation. Will prescribe Bactrim, perform I&D, and advise patient for reassessment in 2-3 days back in ED for wound check.     I&D was performed, wound culture obtained. Educated on wound care instructions.

## 2023-08-05 NOTE — ED ADULT TRIAGE NOTE - CHIEF COMPLAINT QUOTE
wound check to left side forehead above left eye. Pt states it was a bump and he scratched in and it has not healed worried for inflection. pt is in and out of ortho rehab center. dry small round healing wound, dark center.

## 2023-08-06 LAB
GRAM STN FLD: SIGNIFICANT CHANGE UP
SPECIMEN SOURCE: SIGNIFICANT CHANGE UP

## 2023-08-07 DIAGNOSIS — Z85.46 PERSONAL HISTORY OF MALIGNANT NEOPLASM OF PROSTATE: ICD-10-CM

## 2023-08-07 DIAGNOSIS — R42 DIZZINESS AND GIDDINESS: ICD-10-CM

## 2023-08-07 DIAGNOSIS — Z88.0 ALLERGY STATUS TO PENICILLIN: ICD-10-CM

## 2023-08-07 DIAGNOSIS — L02.414 CUTANEOUS ABSCESS OF LEFT UPPER LIMB: ICD-10-CM

## 2023-08-07 LAB
-  AMPICILLIN/SULBACTAM: SIGNIFICANT CHANGE UP
-  CEFAZOLIN: SIGNIFICANT CHANGE UP
-  CLINDAMYCIN: SIGNIFICANT CHANGE UP
-  ERYTHROMYCIN: SIGNIFICANT CHANGE UP
-  GENTAMICIN: SIGNIFICANT CHANGE UP
-  OXACILLIN: SIGNIFICANT CHANGE UP
-  PENICILLIN: SIGNIFICANT CHANGE UP
-  RIFAMPIN: SIGNIFICANT CHANGE UP
-  TETRACYCLINE: SIGNIFICANT CHANGE UP
-  TRIMETHOPRIM/SULFAMETHOXAZOLE: SIGNIFICANT CHANGE UP
-  VANCOMYCIN: SIGNIFICANT CHANGE UP
METHOD TYPE: SIGNIFICANT CHANGE UP

## 2023-08-08 ENCOUNTER — EMERGENCY (EMERGENCY)
Facility: HOSPITAL | Age: 68
LOS: 1 days | Discharge: HOME | End: 2023-08-08
Admitting: EMERGENCY MEDICINE
Payer: MEDICARE

## 2023-08-08 VITALS
RESPIRATION RATE: 16 BRPM | HEIGHT: 64 IN | WEIGHT: 134.92 LBS | HEART RATE: 72 BPM | TEMPERATURE: 98 F | SYSTOLIC BLOOD PRESSURE: 114 MMHG | OXYGEN SATURATION: 95 % | DIASTOLIC BLOOD PRESSURE: 65 MMHG

## 2023-08-08 DIAGNOSIS — B95.61 METHICILLIN SUSCEPTIBLE STAPHYLOCOCCUS AUREUS INFECTION AS THE CAUSE OF DISEASES CLASSIFIED ELSEWHERE: ICD-10-CM

## 2023-08-08 DIAGNOSIS — L02.414 CUTANEOUS ABSCESS OF LEFT UPPER LIMB: ICD-10-CM

## 2023-08-08 DIAGNOSIS — Z88.0 ALLERGY STATUS TO PENICILLIN: ICD-10-CM

## 2023-08-08 PROCEDURE — 99282 EMERGENCY DEPT VISIT SF MDM: CPT | Mod: FS

## 2023-08-08 NOTE — ED PROVIDER NOTE - PHYSICAL EXAMINATION
CONSTITUTIONAL: Well-appearing; well-nourished; in no apparent distress.   	HEAD: Normocephalic; atraumatic.   LUE: abscess to left shoulder with packing in place, minimal surrounding erythema, +induration, packing removed with pus expressed. no crepitus. no streaking. good ROM joints. dpi. soft compartments.   	NEURO: A & O x 3; face symmetric; grossly unremarkable.   PSYCHOLOGICAL: The patient’s mood and manner are appropriate.

## 2023-08-08 NOTE — ED PROVIDER NOTE - CLINICAL SUMMARY MEDICAL DECISION MAKING FREE TEXT BOX
s/p i&D left shoulder 2 days, wound culture +staph aureus, sens to bactrim. patient taking bactrim, afebrile. packing removed, still has some purulent drainage, irrigated, packing placed, return in 2 days for wound check, continue bactrim, will also provide referral for plastics.

## 2023-08-08 NOTE — ED PROVIDER NOTE - CARE PROVIDER_API CALL
Jaun Slaughter  Plastic Surgery  [address], [address]  [place] [address]  Phone: [phone]  Fax: [phone]  Follow Up Time:     Gabriella Nguyen  Plastic Surgery  [address] [place] [id number]  Phone: [phone]  Fax: [phone]  Follow Up Time:

## 2023-08-08 NOTE — ED PROVIDER NOTE - OBJECTIVE STATEMENT
68 yo male here with wound check to left shoulder, s/p i&d 2 days ago, on bactrim, wound culture grew staph aureus, sens to bactrim. patient denies fever/chills. pain controlled.

## 2023-08-08 NOTE — ED PROVIDER NOTE - NS ED ATTENDING STATEMENT MOD
This was a shared visit with the REBEKAH. I reviewed and verified the documentation and independently performed the documented:

## 2023-08-08 NOTE — ED PROVIDER NOTE - PATIENT PORTAL LINK FT
You can access the FollowMyHealth Patient Portal offered by [facility] by registering at the following website: [email or web address]. By joining [employer]’s FollowMyHealth portal, you will also be able to view your health information using other applications (apps) compatible with our system.

## 2023-08-08 NOTE — ED PROVIDER NOTE - NSFOLLOWUPINSTRUCTIONS_ED_ALL_ED_FT
Continue taking antibiotics  Take Ibuprofen 600mg every 6-8 hours as needed for pain, take with food, and in addition you may take Tylenol 500 mg every 6-8 hours as needed for pain    Please return in 2 days for wound check    Return for fever, arm redness or streaking down arm, swelling to left arm, or any concerns.     Follow up with plastics.

## 2023-08-10 LAB
CULTURE RESULTS: SIGNIFICANT CHANGE UP
ORGANISM # SPEC MICROSCOPIC CNT: SIGNIFICANT CHANGE UP
ORGANISM # SPEC MICROSCOPIC CNT: SIGNIFICANT CHANGE UP
SPECIMEN SOURCE: SIGNIFICANT CHANGE UP

## 2023-08-11 ENCOUNTER — EMERGENCY (EMERGENCY)
Facility: HOSPITAL | Age: 68
LOS: 1 days | Discharge: HOME | End: 2023-08-11
Attending: EMERGENCY MEDICINE | Admitting: EMERGENCY MEDICINE
Payer: MEDICARE

## 2023-08-11 VITALS
DIASTOLIC BLOOD PRESSURE: 65 MMHG | SYSTOLIC BLOOD PRESSURE: 119 MMHG | WEIGHT: 134.92 LBS | HEIGHT: 64 IN | HEART RATE: 77 BPM | RESPIRATION RATE: 16 BRPM | TEMPERATURE: 98 F | OXYGEN SATURATION: 98 %

## 2023-08-11 VITALS
TEMPERATURE: 98 F | RESPIRATION RATE: 16 BRPM | OXYGEN SATURATION: 97 % | SYSTOLIC BLOOD PRESSURE: 115 MMHG | HEART RATE: 60 BPM | DIASTOLIC BLOOD PRESSURE: 68 MMHG

## 2023-08-11 DIAGNOSIS — L02.414 CUTANEOUS ABSCESS OF LEFT UPPER LIMB: ICD-10-CM

## 2023-08-11 DIAGNOSIS — Z88.0 ALLERGY STATUS TO PENICILLIN: ICD-10-CM

## 2023-08-11 LAB
ALBUMIN SERPL ELPH-MCNC: 4 G/DL — SIGNIFICANT CHANGE UP (ref 3.4–5)
ALP SERPL-CCNC: 142 U/L — HIGH (ref 40–120)
ALT FLD-CCNC: 24 U/L — SIGNIFICANT CHANGE UP (ref 12–42)
ANION GAP SERPL CALC-SCNC: 7 MMOL/L — LOW (ref 9–16)
APTT BLD: 32.5 SEC — SIGNIFICANT CHANGE UP (ref 24.5–35.6)
AST SERPL-CCNC: 16 U/L — SIGNIFICANT CHANGE UP (ref 15–37)
BASOPHILS # BLD AUTO: 0.03 K/UL — SIGNIFICANT CHANGE UP (ref 0–0.2)
BASOPHILS NFR BLD AUTO: 0.5 % — SIGNIFICANT CHANGE UP (ref 0–2)
BILIRUB SERPL-MCNC: 0.2 MG/DL — SIGNIFICANT CHANGE UP (ref 0.2–1.2)
BUN SERPL-MCNC: 18 MG/DL — SIGNIFICANT CHANGE UP (ref 7–23)
CALCIUM SERPL-MCNC: 8.9 MG/DL — SIGNIFICANT CHANGE UP (ref 8.5–10.5)
CHLORIDE SERPL-SCNC: 97 MMOL/L — SIGNIFICANT CHANGE UP (ref 96–108)
CO2 SERPL-SCNC: 27 MMOL/L — SIGNIFICANT CHANGE UP (ref 22–31)
CREAT SERPL-MCNC: 1.07 MG/DL — SIGNIFICANT CHANGE UP (ref 0.5–1.3)
EGFR: 75 ML/MIN/1.73M2 — SIGNIFICANT CHANGE UP
EOSINOPHIL # BLD AUTO: 0.3 K/UL — SIGNIFICANT CHANGE UP (ref 0–0.5)
EOSINOPHIL NFR BLD AUTO: 4.5 % — SIGNIFICANT CHANGE UP (ref 0–6)
GLUCOSE SERPL-MCNC: 98 MG/DL — SIGNIFICANT CHANGE UP (ref 70–99)
HCT VFR BLD CALC: 33.5 % — LOW (ref 39–50)
HGB BLD-MCNC: 10.9 G/DL — LOW (ref 13–17)
IMM GRANULOCYTES NFR BLD AUTO: 0.8 % — SIGNIFICANT CHANGE UP (ref 0–0.9)
INR BLD: 0.99 — SIGNIFICANT CHANGE UP (ref 0.85–1.18)
LACTATE BLDV-MCNC: 0.8 MMOL/L — SIGNIFICANT CHANGE UP (ref 0.5–2)
LYMPHOCYTES # BLD AUTO: 1.27 K/UL — SIGNIFICANT CHANGE UP (ref 1–3.3)
LYMPHOCYTES # BLD AUTO: 19.1 % — SIGNIFICANT CHANGE UP (ref 13–44)
MCHC RBC-ENTMCNC: 29 PG — SIGNIFICANT CHANGE UP (ref 27–34)
MCHC RBC-ENTMCNC: 32.5 GM/DL — SIGNIFICANT CHANGE UP (ref 32–36)
MCV RBC AUTO: 89.1 FL — SIGNIFICANT CHANGE UP (ref 80–100)
MONOCYTES # BLD AUTO: 0.75 K/UL — SIGNIFICANT CHANGE UP (ref 0–0.9)
MONOCYTES NFR BLD AUTO: 11.3 % — SIGNIFICANT CHANGE UP (ref 2–14)
NEUTROPHILS # BLD AUTO: 4.24 K/UL — SIGNIFICANT CHANGE UP (ref 1.8–7.4)
NEUTROPHILS NFR BLD AUTO: 63.8 % — SIGNIFICANT CHANGE UP (ref 43–77)
NRBC # BLD: 0 /100 WBCS — SIGNIFICANT CHANGE UP (ref 0–0)
PLATELET # BLD AUTO: 285 K/UL — SIGNIFICANT CHANGE UP (ref 150–400)
POTASSIUM SERPL-MCNC: 5.4 MMOL/L — HIGH (ref 3.5–5.3)
POTASSIUM SERPL-SCNC: 5.4 MMOL/L — HIGH (ref 3.5–5.3)
PROT SERPL-MCNC: 7 G/DL — SIGNIFICANT CHANGE UP (ref 6.4–8.2)
PROTHROM AB SERPL-ACNC: 11.2 SEC — SIGNIFICANT CHANGE UP (ref 9.5–13)
RBC # BLD: 3.76 M/UL — LOW (ref 4.2–5.8)
RBC # FLD: 13.4 % — SIGNIFICANT CHANGE UP (ref 10.3–14.5)
SODIUM SERPL-SCNC: 131 MMOL/L — LOW (ref 132–145)
WBC # BLD: 6.64 K/UL — SIGNIFICANT CHANGE UP (ref 3.8–10.5)
WBC # FLD AUTO: 6.64 K/UL — SIGNIFICANT CHANGE UP (ref 3.8–10.5)

## 2023-08-11 PROCEDURE — 10060 I&D ABSCESS SIMPLE/SINGLE: CPT | Mod: 58

## 2023-08-11 PROCEDURE — 73201 CT UPPER EXTREMITY W/DYE: CPT | Mod: 26,LT,MH

## 2023-08-11 PROCEDURE — 99285 EMERGENCY DEPT VISIT HI MDM: CPT | Mod: 25

## 2023-08-11 RX ORDER — OXYCODONE AND ACETAMINOPHEN 5; 325 MG/1; MG/1
1 TABLET ORAL
Qty: 8 | Refills: 0
Start: 2023-08-11 | End: 2023-08-12

## 2023-08-11 RX ORDER — VANCOMYCIN HCL 1 G
1000 VIAL (EA) INTRAVENOUS ONCE
Refills: 0 | Status: COMPLETED | OUTPATIENT
Start: 2023-08-11 | End: 2023-08-11

## 2023-08-11 RX ADMIN — Medication 250 MILLIGRAM(S): at 14:44

## 2023-08-11 NOTE — ED PROVIDER NOTE - PATIENT PORTAL LINK FT
You can access the FollowMyHealth Patient Portal offered by [facility] by registering at the following website: [email or web address]. By joining [employer]’s FollowMyHealth portal, you will also be able to view your health information using other applications (apps) compatible with our system.

## 2023-08-11 NOTE — ED ADULT TRIAGE NOTE - CHIEF COMPLAINT QUOTE
Pt here for wound check to left shoulder. Dressing with packing still in place. yellowish drainage. Pt denies fevers. Wife states redness is better than before.

## 2023-08-11 NOTE — ED PROVIDER NOTE - CARE PROVIDER_API CALL
Cm Shea  Surgery  [address], [address]  [place] [address]  Phone: [phone]  Fax: [phone]  Scheduled Appointment: 08/14/2023

## 2023-08-11 NOTE — ED PROVIDER NOTE - PHYSICAL EXAMINATION
Constitutional: awake and alert, in no acute distress  HEENT: head normocephalic and atraumatic. moist mucous membranes  Eyes: extraocular movements intact, normal conjunctiva  Neck: supple, normal ROM  Cardiovascular: regular rate   Pulmonary: no respiratory distress  Gastrointestinal: abdomen flat and nondistended  Skin: warm, dry, normal for ethnicity.  2cm x2cm area of erosion with devitalized yellowish tissue and small amt of purulence elicited with pressure applied around lesion.  very small amt of erythema surrounding central lesion.  Musculoskeletal: no edema, no deformity  Neurological: oriented x4, no focal neurologic deficit.   Psychiatric: calm and cooperative

## 2023-08-11 NOTE — ED PROVIDER NOTE - OBJECTIVE STATEMENT
66yo M seen here 1wk ago for L shoulder abscess here for wound check.  Pt initially had lesion I&D'ed with packing placed last week, then returned 3d for wound check, where wound was re-packed, and is now back for another wound check.  States wound has been producing pus consistently.  No fever or chills.  No pain with moving shoulder.  Has been compliant w PO bactrim for the past week, has 3 more days left.  Wound culture +MRSA, sens to bactrim.

## 2023-08-11 NOTE — ED PROVIDER NOTE - CLINICAL SUMMARY MEDICAL DECISION MAKING FREE TEXT BOX
Pt here for wound recheck after having L shoulder abscess I&D'ed 1wk ago and repacked 3d ago.  On exam afebrile, VSS, well appearing, with abscess with central area of devitalized tissue and small amt of purulence draining.  packing removed.  Labs show no e/o sepsis.  CT L shoulder with persistent fluid collection, not tracking into muscle.  Discussed w Dr. Shea from surgery-- can send pt to  ED for surgery eval or have pt follow up with her in office on Monday.  Pt prefers to follow up on Monday.  Wound washed out in ED and opened further with more purulent drainage, some devitalized tissue debrided at bedside.  Will switch pt to clindamycin (per wound culture sensitivities), kika w plan for outpatient surgery follow up in 3d.

## 2023-08-11 NOTE — ED PROVIDER NOTE - NSFOLLOWUPINSTRUCTIONS_ED_ALL_ED_FT
Call Dr. Shea's office first thing Monday morning to schedule an appointment for Monday.  Tell them you were seen in the Emergency Room and told to follow up with Dr. Shea on Monday 8/14.          Abscess    WHAT YOU NEED TO KNOW:    A warm compress may help your abscess drain. Your healthcare provider may make a cut in the abscess so it can drain. You may need surgery to remove an abscess that is on your hands or buttocks.     DISCHARGE INSTRUCTIONS:    Return to the emergency department if:   •The area around your abscess becomes very painful, warm, or has red streaks.      •You have a fever and chills.      •Your heart is beating faster than usual.       •You feel faint or confused.      Contact your healthcare provider if:   •Your abscess gets bigger or does not get better.      •Your abscess returns.      •You have questions or concerns about your condition or care.      Medicines: You may need any of the following:   •Antibiotics help treat a bacterial infection.       •Acetaminophen decreases pain and fever. It is available without a doctor's order. Ask how much to take and how often to take it. Follow directions. Acetaminophen can cause liver damage if not taken correctly.      •NSAIDs, such as ibuprofen, help decrease swelling, pain, and fever. This medicine is available with or without a doctor's order. NSAIDs can cause stomach bleeding or kidney problems in certain people. If you take blood thinner medicine, always ask your healthcare provider if NSAIDs are safe for you. Always read the medicine label and follow directions.      •Take your medicine as directed. Contact your healthcare provider if you think your medicine is not helping or if you have side effects. Tell him or her if you are allergic to any medicine. Keep a list of the medicines, vitamins, and herbs you take. Include the amounts, and when and why you take them. Bring the list or the pill bottles to follow-up visits. Carry your medicine list with you in case of an emergency.      Self-care:   •Apply a warm compress to your abscess. This will help it open and drain. Wet a washcloth in warm, but not hot, water. Apply the compress for 10 minutes. Repeat this 4 times each day. Do not press on an abscess or try to open it with a needle. You may push the bacteria deeper or into your blood.       •Do not share your clothes, towels, or sheets with anyone. This can spread the infection to others.       •Wash your hands often. This can help prevent the spread of germs. Use soap and water or an alcohol-based hand rub.       Care for your wound after it is drained:   •Care for your wound as directed. If your healthcare provider says it is okay, carefully remove the bandage and gauze packing. You may need to soak the gauze to get it out of your wound. Clean your wound and the area around it as directed. Dry the area and put on new, clean bandages. Change your bandages when they get wet or dirty.       •Ask your healthcare provider how to change the gauze in your wound. Keep track of how many pieces of gauze are placed inside the wound. Do not put too much packing in the wound. Do not pack the gauze too tightly in your wound.       Follow up with your healthcare provider in 1 to 3 days: You may need to have your packing removed or your bandage changed. Write down your questions so you remember to ask them during your visits.

## 2023-08-15 ENCOUNTER — APPOINTMENT (OUTPATIENT)
Dept: BARIATRICS | Facility: CLINIC | Age: 68
End: 2023-08-15
Payer: MEDICARE

## 2023-08-15 VITALS
BODY MASS INDEX: 23.39 KG/M2 | HEART RATE: 65 BPM | OXYGEN SATURATION: 99 % | SYSTOLIC BLOOD PRESSURE: 120 MMHG | DIASTOLIC BLOOD PRESSURE: 67 MMHG | WEIGHT: 137 LBS | TEMPERATURE: 97.2 F | HEIGHT: 64 IN

## 2023-08-15 DIAGNOSIS — N40.0 BENIGN PROSTATIC HYPERPLASIA WITHOUT LOWER URINARY TRACT SYMPMS: ICD-10-CM

## 2023-08-15 DIAGNOSIS — L02.91 CUTANEOUS ABSCESS, UNSPECIFIED: ICD-10-CM

## 2023-08-15 PROCEDURE — 99203 OFFICE O/P NEW LOW 30 MIN: CPT | Mod: 25

## 2023-08-15 PROCEDURE — 11042 DBRDMT SUBQ TIS 1ST 20SQCM/<: CPT

## 2023-08-15 RX ORDER — SULFAMETHOXAZOLE AND TRIMETHOPRIM 800; 160 MG/1; MG/1
800-160 TABLET ORAL TWICE DAILY
Qty: 14 | Refills: 0 | Status: ACTIVE | COMMUNITY
Start: 2023-08-15 | End: 1900-01-01

## 2023-08-15 RX ORDER — PHENOBARBITAL 97.2 MG/1
TABLET ORAL
Refills: 0 | Status: ACTIVE | COMMUNITY

## 2023-08-16 NOTE — PLAN
[FreeTextEntry1] : 1. switch to bactrim based on sensitivity data and lower risk medication.  2. silver nitrate applied today to debride fibrinous material 3. wound care instructions - remove dressing and wash in shower daily 4. RTC one week, sooner if any fevers, increased pain or increased redness

## 2023-08-16 NOTE — REVIEW OF SYSTEMS
[As Noted in HPI] : as noted in HPI [Limb Weakness] : no limb weakness [Negative] : Heme/Lymph [de-identified] : no tingling or numbness

## 2023-08-16 NOTE — ASSESSMENT
[FreeTextEntry1] : Wound has previously undergone I+D.  No signs of infection today, there is some fibrinous material that can be expected to delay wound healing. ED cx reviewed

## 2023-08-16 NOTE — HISTORY OF PRESENT ILLNESS
[de-identified] : Mr. Muñiz is a very pleasant 68 y/o man who presents from referral from ED for evaluation of left shoulder wound. He and his wife report it started roughly 3 weeks ago. He has undergone I+D several times with improvement overall. Today he reports minimal drainage, no pain or fevers and overall that the wound is more shallow than it was before. He reports being able to do his normal activities. not requiring pain medication. he denies prior episodes of similar lesions. he has been taking clinda as prescribed. He is not on any anticoagulation and not immunocompromised.

## 2023-08-22 ENCOUNTER — INPATIENT (INPATIENT)
Facility: HOSPITAL | Age: 68
LOS: 3 days | Discharge: HOME | DRG: 41 | End: 2023-08-26
Attending: PSYCHIATRY & NEUROLOGY | Admitting: PSYCHIATRY & NEUROLOGY
Payer: MEDICARE

## 2023-08-22 ENCOUNTER — APPOINTMENT (OUTPATIENT)
Dept: BARIATRICS | Facility: CLINIC | Age: 68
End: 2023-08-22

## 2023-08-22 VITALS
HEART RATE: 88 BPM | RESPIRATION RATE: 16 BRPM | SYSTOLIC BLOOD PRESSURE: 120 MMHG | HEIGHT: 64 IN | WEIGHT: 134.92 LBS | DIASTOLIC BLOOD PRESSURE: 77 MMHG | OXYGEN SATURATION: 98 % | TEMPERATURE: 98 F

## 2023-08-22 LAB
ALBUMIN SERPL ELPH-MCNC: 4.6 G/DL — SIGNIFICANT CHANGE UP (ref 3.4–5)
ALP SERPL-CCNC: 145 U/L — HIGH (ref 40–120)
ALT FLD-CCNC: 30 U/L — SIGNIFICANT CHANGE UP (ref 12–42)
ANION GAP SERPL CALC-SCNC: 17 MMOL/L — HIGH (ref 9–16)
APTT BLD: 33.4 SEC — SIGNIFICANT CHANGE UP (ref 24.5–35.6)
AST SERPL-CCNC: 26 U/L — SIGNIFICANT CHANGE UP (ref 15–37)
BASOPHILS # BLD AUTO: 0.04 K/UL — SIGNIFICANT CHANGE UP (ref 0–0.2)
BASOPHILS NFR BLD AUTO: 0.5 % — SIGNIFICANT CHANGE UP (ref 0–2)
BILIRUB SERPL-MCNC: 0.5 MG/DL — SIGNIFICANT CHANGE UP (ref 0.2–1.2)
BUN SERPL-MCNC: 20 MG/DL — SIGNIFICANT CHANGE UP (ref 7–23)
CALCIUM SERPL-MCNC: 9.1 MG/DL — SIGNIFICANT CHANGE UP (ref 8.5–10.5)
CHLORIDE SERPL-SCNC: 94 MMOL/L — LOW (ref 96–108)
CO2 SERPL-SCNC: 20 MMOL/L — LOW (ref 22–31)
CREAT SERPL-MCNC: 1.03 MG/DL — SIGNIFICANT CHANGE UP (ref 0.5–1.3)
EGFR: 79 ML/MIN/1.73M2 — SIGNIFICANT CHANGE UP
EOSINOPHIL # BLD AUTO: 0.09 K/UL — SIGNIFICANT CHANGE UP (ref 0–0.5)
EOSINOPHIL NFR BLD AUTO: 1.2 % — SIGNIFICANT CHANGE UP (ref 0–6)
ETHANOL SERPL-MCNC: <3 MG/DL — SIGNIFICANT CHANGE UP
GLUCOSE BLDC GLUCOMTR-MCNC: 135 MG/DL — HIGH (ref 70–99)
GLUCOSE SERPL-MCNC: 125 MG/DL — HIGH (ref 70–99)
HCT VFR BLD CALC: 37.6 % — LOW (ref 39–50)
HGB BLD-MCNC: 13 G/DL — SIGNIFICANT CHANGE UP (ref 13–17)
IMM GRANULOCYTES NFR BLD AUTO: 0.7 % — SIGNIFICANT CHANGE UP (ref 0–0.9)
INR BLD: 1.06 — SIGNIFICANT CHANGE UP (ref 0.85–1.18)
LYMPHOCYTES # BLD AUTO: 1.28 K/UL — SIGNIFICANT CHANGE UP (ref 1–3.3)
LYMPHOCYTES # BLD AUTO: 16.8 % — SIGNIFICANT CHANGE UP (ref 13–44)
MAGNESIUM SERPL-MCNC: 1.5 MG/DL — LOW (ref 1.6–2.6)
MCHC RBC-ENTMCNC: 29.3 PG — SIGNIFICANT CHANGE UP (ref 27–34)
MCHC RBC-ENTMCNC: 34.6 GM/DL — SIGNIFICANT CHANGE UP (ref 32–36)
MCV RBC AUTO: 84.7 FL — SIGNIFICANT CHANGE UP (ref 80–100)
MONOCYTES # BLD AUTO: 0.62 K/UL — SIGNIFICANT CHANGE UP (ref 0–0.9)
MONOCYTES NFR BLD AUTO: 8.1 % — SIGNIFICANT CHANGE UP (ref 2–14)
NEUTROPHILS # BLD AUTO: 5.53 K/UL — SIGNIFICANT CHANGE UP (ref 1.8–7.4)
NEUTROPHILS NFR BLD AUTO: 72.7 % — SIGNIFICANT CHANGE UP (ref 43–77)
NRBC # BLD: 0 /100 WBCS — SIGNIFICANT CHANGE UP (ref 0–0)
PLATELET # BLD AUTO: 379 K/UL — SIGNIFICANT CHANGE UP (ref 150–400)
POTASSIUM SERPL-MCNC: 3.7 MMOL/L — SIGNIFICANT CHANGE UP (ref 3.5–5.3)
POTASSIUM SERPL-SCNC: 3.7 MMOL/L — SIGNIFICANT CHANGE UP (ref 3.5–5.3)
PROT SERPL-MCNC: 7.6 G/DL — SIGNIFICANT CHANGE UP (ref 6.4–8.2)
PROTHROM AB SERPL-ACNC: 11.6 SEC — SIGNIFICANT CHANGE UP (ref 9.5–13)
RBC # BLD: 4.44 M/UL — SIGNIFICANT CHANGE UP (ref 4.2–5.8)
RBC # FLD: 12.9 % — SIGNIFICANT CHANGE UP (ref 10.3–14.5)
SODIUM SERPL-SCNC: 131 MMOL/L — LOW (ref 132–145)
TROPONIN I, HIGH SENSITIVITY RESULT: 7.3 NG/L — SIGNIFICANT CHANGE UP
TSH SERPL-MCNC: 1.27 UIU/ML — SIGNIFICANT CHANGE UP (ref 0.36–3.74)
WBC # BLD: 7.61 K/UL — SIGNIFICANT CHANGE UP (ref 3.8–10.5)
WBC # FLD AUTO: 7.61 K/UL — SIGNIFICANT CHANGE UP (ref 3.8–10.5)

## 2023-08-22 PROCEDURE — 70496 CT ANGIOGRAPHY HEAD: CPT | Mod: 26,MH

## 2023-08-22 PROCEDURE — 71045 X-RAY EXAM CHEST 1 VIEW: CPT | Mod: 26

## 2023-08-22 PROCEDURE — 70498 CT ANGIOGRAPHY NECK: CPT | Mod: 26,MH

## 2023-08-22 PROCEDURE — 99291 CRITICAL CARE FIRST HOUR: CPT

## 2023-08-22 PROCEDURE — 0042T: CPT

## 2023-08-22 PROCEDURE — 70450 CT HEAD/BRAIN W/O DYE: CPT | Mod: 26,MH,59

## 2023-08-22 RX ORDER — PHENOBARBITAL 60 MG
129.6 TABLET ORAL
Refills: 0 | Status: DISCONTINUED | OUTPATIENT
Start: 2023-08-22 | End: 2023-08-26

## 2023-08-22 RX ORDER — SODIUM CHLORIDE 9 MG/ML
10 INJECTION INTRAMUSCULAR; INTRAVENOUS; SUBCUTANEOUS ONCE
Refills: 0 | Status: COMPLETED | OUTPATIENT
Start: 2023-08-22 | End: 2023-08-22

## 2023-08-22 RX ORDER — FLUOXETINE HCL 10 MG
80 CAPSULE ORAL DAILY
Refills: 0 | Status: DISCONTINUED | OUTPATIENT
Start: 2023-08-22 | End: 2023-08-26

## 2023-08-22 RX ORDER — TENECTEPLASE 50 MG
15 KIT INTRAVENOUS ONCE
Refills: 0 | Status: COMPLETED | OUTPATIENT
Start: 2023-08-22 | End: 2023-08-22

## 2023-08-22 RX ORDER — MAGNESIUM SULFATE 500 MG/ML
1 VIAL (ML) INJECTION ONCE
Refills: 0 | Status: COMPLETED | OUTPATIENT
Start: 2023-08-22 | End: 2023-08-22

## 2023-08-22 RX ORDER — LAMOTRIGINE 25 MG/1
200 TABLET, ORALLY DISINTEGRATING ORAL AT BEDTIME
Refills: 0 | Status: DISCONTINUED | OUTPATIENT
Start: 2023-08-22 | End: 2023-08-26

## 2023-08-22 RX ORDER — MORPHINE SULFATE 50 MG/1
15 CAPSULE, EXTENDED RELEASE ORAL DAILY
Refills: 0 | Status: DISCONTINUED | OUTPATIENT
Start: 2023-08-22 | End: 2023-08-26

## 2023-08-22 RX ORDER — BUPROPION HYDROCHLORIDE 150 MG/1
300 TABLET, EXTENDED RELEASE ORAL DAILY
Refills: 0 | Status: DISCONTINUED | OUTPATIENT
Start: 2023-08-22 | End: 2023-08-26

## 2023-08-22 RX ORDER — MORPHINE SULFATE 50 MG/1
2 CAPSULE, EXTENDED RELEASE ORAL ONCE
Refills: 0 | Status: DISCONTINUED | OUTPATIENT
Start: 2023-08-22 | End: 2023-08-22

## 2023-08-22 RX ORDER — LEVOTHYROXINE SODIUM 125 MCG
100 TABLET ORAL DAILY
Refills: 0 | Status: DISCONTINUED | OUTPATIENT
Start: 2023-08-22 | End: 2023-08-26

## 2023-08-22 RX ORDER — TRAZODONE HCL 50 MG
50 TABLET ORAL AT BEDTIME
Refills: 0 | Status: DISCONTINUED | OUTPATIENT
Start: 2023-08-22 | End: 2023-08-26

## 2023-08-22 RX ORDER — LAMOTRIGINE 25 MG/1
300 TABLET, ORALLY DISINTEGRATING ORAL DAILY
Refills: 0 | Status: DISCONTINUED | OUTPATIENT
Start: 2023-08-23 | End: 2023-08-26

## 2023-08-22 RX ADMIN — TENECTEPLASE 2160 MILLIGRAM(S): KIT at 16:17

## 2023-08-22 RX ADMIN — Medication 80 MILLIGRAM(S): at 22:18

## 2023-08-22 RX ADMIN — MORPHINE SULFATE 2 MILLIGRAM(S): 50 CAPSULE, EXTENDED RELEASE ORAL at 22:33

## 2023-08-22 RX ADMIN — SODIUM CHLORIDE 10 MILLILITER(S): 9 INJECTION INTRAMUSCULAR; INTRAVENOUS; SUBCUTANEOUS at 16:17

## 2023-08-22 RX ADMIN — Medication 50 MILLIGRAM(S): at 22:33

## 2023-08-22 RX ADMIN — MORPHINE SULFATE 15 MILLIGRAM(S): 50 CAPSULE, EXTENDED RELEASE ORAL at 22:53

## 2023-08-22 RX ADMIN — BUPROPION HYDROCHLORIDE 300 MILLIGRAM(S): 150 TABLET, EXTENDED RELEASE ORAL at 22:18

## 2023-08-22 RX ADMIN — MORPHINE SULFATE 15 MILLIGRAM(S): 50 CAPSULE, EXTENDED RELEASE ORAL at 22:33

## 2023-08-22 RX ADMIN — MORPHINE SULFATE 2 MILLIGRAM(S): 50 CAPSULE, EXTENDED RELEASE ORAL at 22:48

## 2023-08-22 RX ADMIN — LAMOTRIGINE 200 MILLIGRAM(S): 25 TABLET, ORALLY DISINTEGRATING ORAL at 22:18

## 2023-08-22 RX ADMIN — Medication 100 GRAM(S): at 19:36

## 2023-08-22 NOTE — CONSULT NOTE ADULT - ASSESSMENT
68 yo M with PMH of epilepsy (on PHB with last seizures 40 years ago, semiology: grand mal), HTN, HLD. opiod dependence, skin abscess, who presented to [facility] with sudden onset of word-finding difficulties and disorientation. NIHSS of 4 for LOC: questions and the severity of the aphasia. Notably there is no weakness or neglect on the exam.  No dysarthria as well. Patient is within TNK window and there are no CI to TNK. Overall given sudden onset of symptoms, evidence of expressive > receptive aphacia on the exam and several risk factors, the decision was made to administer TNK.     TNK given at 1617. [facility] with post TNK protocol.     Dick Aly MD  Vascular Neurology Fellow

## 2023-08-22 NOTE — ED ADULT NURSE NOTE - NSFALLRISKINTERV_ED_ALL_ED

## 2023-08-22 NOTE — ED PROVIDER NOTE - NEUROLOGICAL, MLM
expressive aphasia, normal strength and sensation all extremities, symmetric smile, normal visual fields, normal nose to finger B/L

## 2023-08-22 NOTE — ED ADULT NURSE NOTE - OBJECTIVE STATEMENT
bibems by wife as pt was unable to find words at 1pm today. pt also reported dizziness at that time. wife reports that pt repetitively stated "I can't". upon arrival to ED, pt was able to state name and date of birth, but unable to fully describe symptoms as he was having difficulty finding words. code stroke activated by triage RN, pt escorted to CT. pt continued to have aphasia post CT, but stated he was doing better than before. TNK administered at 1617 after tele-stroke consultation.

## 2023-08-22 NOTE — CONSULT NOTE ADULT - ASSESSMENT
67y Male with PMHx of epilepsy (on PHB with last seizures 40 years ago, semiology: grand mal), HTN, HLD. opiod dependence after a pelvic fracture in March 2023, left shoulder abscess, who presented to [facility] with sudden onset of word-finding difficulties and disorientation. LKW 1400. Stroke code called at [facility] ED. NIHSS initially a 4, improved to a 2 prior to TNK push. CTH was negative for any acute intracranial pathology. CTA without any large vessel occlusion or high grade stenosis. CTP with Elevated Tmax reported and both posterior cerebral hemispheres and additionally in the left anterior temporal and frontal lobes, unclear if significant. Case discussed with Dr. Dick Aly through telestroke prior to urgent intervention. Risk and benefits of tenecteplase were discussed with patient/family member including risk of symptomatic ICH/death. Decision was made that benefits outweighed risks and tenecteplase was administered. Patient transferred to [facility] for further working and post TNK monitoring in the MICU.      1) Admit to MICU for post tenecteplase monitoring  - Please perform dysphagia screen now   - Once dysphagia screen passed, start atorvastatin 80 once daily  - Notify provider if patient passes dysphasgia screen able to have diet if no pending intervention  - Keep BP <180/105, notify provider if out of range  - During infusion, neuro exams every 15 minutes.   - Check every 15 minutes for first hour after tenecteplase; every 30 minutes for the next 6 hours; hourly until 24 hours from end of infusion.   - Minimize arterial and venous punctures, able to draw blood 4hr s/p alteplase  - Keep temp <100F and maintain glucose 140-180.   - Notify provider for "HR >100 or <55 or SaO2 <95%"  - Maintain strict bed rest for 12 hours with HOB <30 degrees  - After 12hr s/p tenecteplase, patient able to ambulate with assist    - Repeat CT head with any acute change in mental status.   - No antiplatelet, anticoagulation, and heparin sq until 24 hour CT head negative for bleed.     2) Labs: Obtain Hemoglobin A1c, Lipid profile set, and TSH    3) Other tests:  - Repeat CT head noncon 24 hours post-tenecteplase to rule out bleed  - MRI brain without contrast   - echo with bubble, may eventually need COY  - PT/OT order  - Swallow evaluation if fails dysphagia screen  - SLP order if patient with dysarthria  - Stroke education  - Obtain Phenobarbital level    4)DVT ppx - SCDs (NO heparin SQ as post tenecteplase protocol)    Discussed with Neurology Attending Dr. Renetta Brock and Dr. Dick Aly 67y Male with PMHx of epilepsy (on PHB with last seizures 40 years ago, semiology: grand mal), HTN, HLD. opiod dependence after a pelvic fracture in March 2023, left shoulder abscess, who presented to [facility] with sudden onset of word-finding difficulties and disorientation. LKW 1400. Stroke code called at [facility] ED. NIHSS initially a 4, improved to a 2 prior to TNK push. CTH was negative for any acute intracranial pathology. CTA without any large vessel occlusion or high grade stenosis. CTP with Elevated Tmax reported and both posterior cerebral hemispheres and additionally in the left anterior temporal and frontal lobes, unclear if significant. Case discussed with Dr. Dick Aly through telestroke prior to urgent intervention. Risk and benefits of tenecteplase were discussed with patient/family member including risk of symptomatic ICH/death. Decision was made that benefits outweighed risks and tenecteplase was administered. Patient transferred to [facility] for further working and post TNK monitoring in the MICU.      1) Admit to MICU for post tenecteplase monitoring  - Please perform dysphagia screen now   - Once dysphagia screen passed, start atorvastatin 80 once daily  - Notify provider if patient passes dysphasgia screen able to have diet if no pending intervention  - Keep BP <180/105, notify provider if out of range  - During infusion, neuro exams every 15 minutes.   - Check every 15 minutes for first hour after tenecteplase; every 30 minutes for the next 6 hours; hourly until 24 hours from end of infusion.   - Minimize arterial and venous punctures, able to draw blood 4hr s/p tenecteplase  - Keep temp <100F and maintain glucose 140-180.   - Notify provider for "HR >100 or <55 or SaO2 <95%"  - Maintain strict bed rest for 12 hours with HOB <30 degrees  - After 12hr s/p tenecteplase, patient able to ambulate with assist    - Repeat CT head with any acute change in mental status.   - No antiplatelet, anticoagulation, and heparin sq until 24 hour CT head negative for bleed.     2) Labs: Obtain Hemoglobin A1c, Lipid profile set, and TSH    3) Other tests:  - Repeat CT head noncon 24 hours post-tenecteplase to rule out bleed  - MRI brain without contrast   - echo with bubble, may eventually need COY  - PT/OT order  - Swallow evaluation if fails dysphagia screen  - SLP order if patient with dysarthria  - Stroke education  - Obtain Phenobarbital level    4)DVT ppx - SCDs (NO heparin SQ as post tenecteplase protocol)    Discussed with Neurology Attending Dr. Renetta Brock and Dr. Dick Aly

## 2023-08-22 NOTE — ED PROVIDER NOTE - CRITICAL CARE ATTENDING CONTRIBUTION TO CARE
pt w expressive aphasia, persistent during ED evaluation. Evaluated by Neuro Stroke service, recommend thrombolytic. Given tenekteplase 4:17. Will admit to Neuro ICU for further monitoring.    The patient was seen immediately upon arrival due to a high probability of imminent or life-threatening deterioration secondary to expresive aphasia concerning  for stroke, which required my direct attention, intervention, and personal management at the bedside. I have personally provided critical care time exclusive of time spent on separately billable procedures. Time includes review of laboratory data, radiology results, discussion with consultants, discussion with the patient's family, and monitoring for potential decompensation.

## 2023-08-22 NOTE — PATIENT PROFILE ADULT - FALL HARM RISK - HARM RISK INTERVENTIONS
Assistance with ambulation/Assistance OOB with selected safe patient handling equipment/Communicate Risk of Fall with Harm to all staff/Discuss with provider need for PT consult/Monitor gait and stability/Provide patient with walking aids - walker, cane, crutches/Reinforce activity limits and safety measures with patient and family/Sit up slowly, dangle for a short time, stand at bedside before walking/Tailored Fall Risk Interventions/Visual Cue: Yellow wristband and red socks/Bed in lowest position, wheels locked, appropriate side rails in place/Call bell, personal items and telephone in reach/Instruct patient to call for assistance before getting out of bed or chair/Non-slip footwear when patient is out of bed/[place] to call system/Physically safe environment - no spills, clutter or unnecessary equipment/Purposeful Proactive Rounding/Room/bathroom lighting operational, light cord in reach

## 2023-08-22 NOTE — PROVIDER CONTACT NOTE (CHANGE IN STATUS NOTIFICATION) - ASSESSMENT
vital signs WNL. neuro exam remains same from prior aside from possible worsening word finding difficulty.

## 2023-08-22 NOTE — ED PROVIDER NOTE - CLINICAL SUMMARY MEDICAL DECISION MAKING FREE TEXT BOX
pt w expressive aphasia, persistent during ED evaluation. Evaluated by Neuro Stroke service, recommend thrombolytic. Given tenekteplase 4:17. Will admit to Neuro ICU for further monitoring.

## 2023-08-22 NOTE — ED ADULT TRIAGE NOTE - CHIEF COMPLAINT QUOTE
Pt presents ED after pt c/o dizziness, blurry vision and  could not find his words,  symptoms 12pm. Stroke code called [facility]

## 2023-08-22 NOTE — H&P ADULT - HISTORY OF PRESENT ILLNESS
67y Male with PMHx of epilepsy (on PHB with last seizures 40 years ago, semiology: grand mal), HTN, HLD. opiod dependence after a pelvic fracture in March 2023, left shoulder abscess, who presented to [facility] with sudden onset of word-finding difficulties and disorientation. LKW 1400. He was speaking normally this morning and was supposed to go out of the house with his wife when she noticed he couldn't get his words out. On initial evaluation NIHSS was a 4. He scored 2 for questions and 2 for severity of the aphasia. Aphasia seemed to be primarily expressive with some component of receptive. There was no weakness or other deficits. CTH was negative for any acute intracranial pathology. CTA without any large vessel occlusion or high grade stenosis. CTP with Elevated Tmax reported and both posterior cerebral hemispheres and additionally in the left anterior temporal and frontal lobes, unclear if significant.   Dr. Dick Aly thoroughly discussed his PMH with his wife. Patient has opioid dependance due to pelvic fracture from March 2023 but he is in program of titrating down the opioids. He is currently only taking morphine, and the last dose was 15mg yesterday. He is on PHB for seizures and he has been taking it for 40 years, his last seizures were 40 years ago with the grand mal semiology. Otherwise he doesn't have any history of recent surgeries or bleeding. Case discussed with Dr. Dick Aly through telestroke prior to urgent intervention. Risk and benefits of tenecteplase were discussed with patient/family member including risk of symptomatic ICH/death. Decision was made that benefits outweighed risks and tenecteplase was administered.

## 2023-08-22 NOTE — ED PROVIDER NOTE - NIH STROKE SCALE: 1A. LEVEL OF CONSCIOUSNESS, QM
Quality 431: Preventive Care And Screening: Unhealthy Alcohol Use - Screening: Patient screened for unhealthy alcohol use using a single question and scores less than 2 times per year
Detail Level: Detailed
Quality 110: Preventive Care And Screening: Influenza Immunization: Influenza Immunization Ordered or Recommended, but not Administered
(0) Alert; keenly responsive

## 2023-08-22 NOTE — H&P ADULT - NSHPPHYSICALEXAM_GEN_ALL_CORE
Constitutional: No acute distress, conversant  Eyes: Anicteric sclerae, moist conjunctivae, see below for CNs  Neck: trachea midline  Cardiovascular: Regular rate and rhythm on monitor  Pulmonary: No use of accessory muscles  GI: Abdomen soft  Extremities: , no edema    Neurologic:  -Mental status: Awake, alert, oriented to person, place, and month and year with choices. Speech is fragmented but has intact naming, repetition, and comprehension, no dysarthria. Recent and remote memory intact but difficulty completing full thoughts d/t aphasia. Follows simple and 2-step commands, had difficulty with 3 step commands. Attention/concentration intact. Fund of knowledge appropriate.  -Cranial nerves:   II: Visual fields are full to confrontation.  III, IV, VI: Extraocular movements are intact without nystagmus. Pupils equally round and reactive to light  V:  Facial sensation V1-V3 equal and intact   VII: Face is symmetric with normal smile  Motor: Normal bulk and tone. No pronator drift. Strength bilateral upper extremity 5/5, bilateral lower extremities 5/5.  Rapid alternating movements intact and symmetric  Sensation: Intact to light touch bilaterally. No neglect or extinction on double simultaneous testing.  Coordination: No dysmetria on finger-to-nose bilaterally

## 2023-08-22 NOTE — H&P ADULT - ASSESSMENT
67y Male with PMHx of epilepsy (on PHB with last seizures 40 years ago, semiology: grand mal), HTN, HLD. opiod dependence after a pelvic fracture in March 2023, left shoulder abscess, who presented to [facility] with sudden onset of word-finding difficulties and disorientation. LKW 1400. Stroke code called at [facility] ED. NIHSS initially a 4, improved to a 2 prior to TNK push. CTH was negative for any acute intracranial pathology. CTA without any large vessel occlusion or high grade stenosis. CTP with Elevated Tmax reported and both posterior cerebral hemispheres and additionally in the left anterior temporal and frontal lobes, unclear if significant. Case discussed with Dr. Dick Aly through telestroke prior to urgent intervention. Risk and benefits of tenecteplase were discussed with patient/family member including risk of symptomatic ICH/death. Decision was made that benefits outweighed risks and tenecteplase was administered. Patient transferred to [facility] for further working and post TNK monitoring in the MICU.    NEURO  # post tenecteplase monitoring  -  dysphagia screen, after that start atorvastatin 80 once daily  - goal BP <180/105, notify provider if out of range  - During infusion, neuro exams every 15 minutes.   - Check every 15 minutes for first hour after tenecteplase; every 30 minutes for the next 6 hours; hourly until 24 hours from end of infusion.   - Minimize arterial and venous punctures when able,   - able to draw blood 4hr s/p alteplase  - Keep temp <100F and maintain glucose 140-180.   - Notify provider for "HR >100 or <55 or SaO2 <95%"  - Maintain strict bed rest for 12 hours with HOB <30 degrees  - After 12hr s/p tenecteplase, patient able to ambulate with assist    - Repeat CT head with any acute change in mental status.   - No antiplatelet, anticoagulation, and heparin sq until 24 hour CT head negative for bleed.     - Obtain Hemoglobin A1c, Lipid profile set, and TSH    # Other tests:  - Repeat CT head noncon 24 hours post-tenecteplase to rule out bleed  - MRI brain without contrast   - echo with bubble, may eventually need COY  - PT/OT order  - Stroke education  - Obtain Phenobarbital level    PPX  DVT ppx - SCDs (NO heparin SQ as post tenecteplase protocol)

## 2023-08-22 NOTE — H&P ADULT - NS ATTEND AMEND GEN_ALL_CORE FT
67 year old man w/ PMH of Epilepsy (on PB and LTG, last seizure 40 years ago, semiology GTC), HTN, HLD, opiod dependence after pelvic fracture 3/2023, L. shoulder abscess presented w/ word-finding difficulties s/p TNK. Patient state he had an episode of "unable to get words out" as reported by wife. States he does not recall this. No similar episode in the past. Last seizure 40 years ago. HIs neurolgist wanted to taper off PB and LTG but patient preferred to stay on.     CTH negative  CTA h/n negative  CTP negative  A1c 5.1  LDL 97  PB level 47.1 (slightly supratherapeutic range)   WBC 12.91    Impression: Transient ?aphasia now resolved, likely localizing to the L. hemisphere. Etiology uncertain, possibly acute ischemic stroke/TIA versus less likely seizure.     Plan:   S/p TNK; After 24 hours, ASA 81mg + Plavix 75mg for three weeks followed by ASA 81mg (extrapolating from [facility])  MR brain w/o contrast  rEEG  TTE/COY  ILR   Infectious workup (UA, CXR, if remains elevated and/or spikes fever, should look at left shoulder).   Gradual normotension   No need to change AEDs as patient prefers to remain on both agents    PT - pending     70 minutes spent on total encounter. The necessity of the time spent during the encounter on this date of service was due to:     Review of imaging and chart; obtaining history; examination of pt; discussion and coordination of care, and discussion of lifestyle modification and risk factor control.

## 2023-08-23 LAB
A1C WITH ESTIMATED AVERAGE GLUCOSE RESULT: 5.1 % — SIGNIFICANT CHANGE UP (ref 4–5.6)
ANION GAP SERPL CALC-SCNC: 11 MMOL/L — SIGNIFICANT CHANGE UP (ref 5–17)
APPEARANCE UR: CLEAR — SIGNIFICANT CHANGE UP
BACTERIA # UR AUTO: PRESENT /HPF
BILIRUB UR-MCNC: ABNORMAL
BUN SERPL-MCNC: 19 MG/DL — SIGNIFICANT CHANGE UP (ref 7–23)
CALCIUM SERPL-MCNC: 9 MG/DL — SIGNIFICANT CHANGE UP (ref 8.4–10.5)
CHLORIDE SERPL-SCNC: 99 MMOL/L — SIGNIFICANT CHANGE UP (ref 96–108)
CHOLEST SERPL-MCNC: 180 MG/DL — SIGNIFICANT CHANGE UP
CO2 SERPL-SCNC: 23 MMOL/L — SIGNIFICANT CHANGE UP (ref 22–31)
COLOR SPEC: YELLOW — SIGNIFICANT CHANGE UP
COMMENT - URINE: SIGNIFICANT CHANGE UP
CREAT SERPL-MCNC: 0.92 MG/DL — SIGNIFICANT CHANGE UP (ref 0.5–1.3)
DIFF PNL FLD: NEGATIVE — SIGNIFICANT CHANGE UP
EGFR: 90 ML/MIN/1.73M2 — SIGNIFICANT CHANGE UP
EPI CELLS # UR: SIGNIFICANT CHANGE UP /HPF (ref 0–5)
ESTIMATED AVERAGE GLUCOSE: 100 MG/DL — SIGNIFICANT CHANGE UP (ref 68–114)
GLUCOSE SERPL-MCNC: 127 MG/DL — HIGH (ref 70–99)
GLUCOSE UR QL: NEGATIVE — SIGNIFICANT CHANGE UP
GRAN CASTS # UR COMP ASSIST: ABNORMAL /LPF
HCT VFR BLD CALC: 33.2 % — LOW (ref 39–50)
HCV AB S/CO SERPL IA: 0.04 S/CO — SIGNIFICANT CHANGE UP
HCV AB SERPL-IMP: SIGNIFICANT CHANGE UP
HDLC SERPL-MCNC: 58 MG/DL — SIGNIFICANT CHANGE UP
HGB BLD-MCNC: 11.3 G/DL — LOW (ref 13–17)
HYALINE CASTS # UR AUTO: SIGNIFICANT CHANGE UP /LPF (ref 0–2)
KETONES UR-MCNC: NEGATIVE — SIGNIFICANT CHANGE UP
LEUKOCYTE ESTERASE UR-ACNC: NEGATIVE — SIGNIFICANT CHANGE UP
LIPID PNL WITH DIRECT LDL SERPL: 97 MG/DL — SIGNIFICANT CHANGE UP
MAGNESIUM SERPL-MCNC: 2.2 MG/DL — SIGNIFICANT CHANGE UP (ref 1.6–2.6)
MCHC RBC-ENTMCNC: 29.4 PG — SIGNIFICANT CHANGE UP (ref 27–34)
MCHC RBC-ENTMCNC: 34 GM/DL — SIGNIFICANT CHANGE UP (ref 32–36)
MCV RBC AUTO: 86.2 FL — SIGNIFICANT CHANGE UP (ref 80–100)
NITRITE UR-MCNC: NEGATIVE — SIGNIFICANT CHANGE UP
NON HDL CHOLESTEROL: 122 MG/DL — SIGNIFICANT CHANGE UP
NRBC # BLD: 0 /100 WBCS — SIGNIFICANT CHANGE UP (ref 0–0)
PH UR: 6 — SIGNIFICANT CHANGE UP (ref 5–8)
PHENOBARB SERPL-MCNC: 47.1 UG/ML — HIGH (ref 15–40)
PHOSPHATE SERPL-MCNC: 3.9 MG/DL — SIGNIFICANT CHANGE UP (ref 2.5–4.5)
PLATELET # BLD AUTO: 328 K/UL — SIGNIFICANT CHANGE UP (ref 150–400)
POTASSIUM SERPL-MCNC: 4.1 MMOL/L — SIGNIFICANT CHANGE UP (ref 3.5–5.3)
POTASSIUM SERPL-SCNC: 4.1 MMOL/L — SIGNIFICANT CHANGE UP (ref 3.5–5.3)
PROT UR-MCNC: 30 MG/DL
RBC # BLD: 3.85 M/UL — LOW (ref 4.2–5.8)
RBC # FLD: 13.5 % — SIGNIFICANT CHANGE UP (ref 10.3–14.5)
RBC CASTS # UR COMP ASSIST: < 5 /HPF — SIGNIFICANT CHANGE UP
SODIUM SERPL-SCNC: 133 MMOL/L — LOW (ref 135–145)
SP GR SPEC: >=1.03 — SIGNIFICANT CHANGE UP (ref 1–1.03)
TRIGL SERPL-MCNC: 124 MG/DL — SIGNIFICANT CHANGE UP
TSH SERPL-MCNC: 3.89 UIU/ML — SIGNIFICANT CHANGE UP (ref 0.27–4.2)
UROBILINOGEN FLD QL: 0.2 E.U./DL — SIGNIFICANT CHANGE UP
WBC # BLD: 12.91 K/UL — HIGH (ref 3.8–10.5)
WBC # FLD AUTO: 12.91 K/UL — HIGH (ref 3.8–10.5)
WBC UR QL: < 5 /HPF — SIGNIFICANT CHANGE UP

## 2023-08-23 PROCEDURE — 71045 X-RAY EXAM CHEST 1 VIEW: CPT | Mod: 26

## 2023-08-23 PROCEDURE — 70551 MRI BRAIN STEM W/O DYE: CPT | Mod: 26

## 2023-08-23 PROCEDURE — 99223 1ST HOSP IP/OBS HIGH 75: CPT | Mod: 95

## 2023-08-23 PROCEDURE — 93306 TTE W/DOPPLER COMPLETE: CPT | Mod: 26

## 2023-08-23 PROCEDURE — 70450 CT HEAD/BRAIN W/O DYE: CPT | Mod: 26

## 2023-08-23 RX ORDER — ASPIRIN/CALCIUM CARB/MAGNESIUM 324 MG
81 TABLET ORAL DAILY
Refills: 0 | Status: DISCONTINUED | OUTPATIENT
Start: 2023-08-23 | End: 2023-08-25

## 2023-08-23 RX ORDER — CLOPIDOGREL BISULFATE 75 MG/1
75 TABLET, FILM COATED ORAL DAILY
Refills: 0 | Status: DISCONTINUED | OUTPATIENT
Start: 2023-08-23 | End: 2023-08-25

## 2023-08-23 RX ORDER — ENOXAPARIN SODIUM 100 MG/ML
40 INJECTION SUBCUTANEOUS EVERY 24 HOURS
Refills: 0 | Status: DISCONTINUED | OUTPATIENT
Start: 2023-08-23 | End: 2023-08-26

## 2023-08-23 RX ADMIN — LAMOTRIGINE 200 MILLIGRAM(S): 25 TABLET, ORALLY DISINTEGRATING ORAL at 21:41

## 2023-08-23 RX ADMIN — CLOPIDOGREL BISULFATE 75 MILLIGRAM(S): 75 TABLET, FILM COATED ORAL at 18:35

## 2023-08-23 RX ADMIN — BUPROPION HYDROCHLORIDE 300 MILLIGRAM(S): 150 TABLET, EXTENDED RELEASE ORAL at 11:26

## 2023-08-23 RX ADMIN — Medication 80 MILLIGRAM(S): at 11:25

## 2023-08-23 RX ADMIN — ENOXAPARIN SODIUM 40 MILLIGRAM(S): 100 INJECTION SUBCUTANEOUS at 18:34

## 2023-08-23 RX ADMIN — MORPHINE SULFATE 15 MILLIGRAM(S): 50 CAPSULE, EXTENDED RELEASE ORAL at 11:42

## 2023-08-23 RX ADMIN — LAMOTRIGINE 300 MILLIGRAM(S): 25 TABLET, ORALLY DISINTEGRATING ORAL at 11:26

## 2023-08-23 RX ADMIN — Medication 81 MILLIGRAM(S): at 18:35

## 2023-08-23 RX ADMIN — Medication 129.6 MILLIGRAM(S): at 18:35

## 2023-08-23 RX ADMIN — Medication 129.6 MILLIGRAM(S): at 05:21

## 2023-08-23 RX ADMIN — Medication 50 MILLIGRAM(S): at 21:41

## 2023-08-23 RX ADMIN — MORPHINE SULFATE 15 MILLIGRAM(S): 50 CAPSULE, EXTENDED RELEASE ORAL at 12:00

## 2023-08-23 RX ADMIN — Medication 100 MICROGRAM(S): at 05:21

## 2023-08-23 NOTE — PHYSICAL THERAPY INITIAL EVALUATION ADULT - ADDITIONAL COMMENTS
Pt lives in a walk up apartment with wife. Pt report to be indpt with all ADLs and IADLs prior to admission. Pt had been using

## 2023-08-23 NOTE — OCCUPATIONAL THERAPY INITIAL EVALUATION ADULT - DIAGNOSIS, OT EVAL
Pt presenting with no neurological deficits and performing all ADL/functional mobility independently at this time.

## 2023-08-23 NOTE — PROGRESS NOTE ADULT - SUBJECTIVE AND OBJECTIVE BOX
-----------TRANSFER FROM MICU TO STROKE TELE-------------  67y Male with PMHx of epilepsy (on PHB with last seizures 40 years ago, semiology: grand mal), HTN, HLD. opiod dependence after a pelvic fracture in March 2023, left shoulder abscess, who presented to [facility] with sudden onset of word-finding difficulties and disorientation. LKW 1400. He was speaking normally this morning and was supposed to go out of the house with his wife when she noticed he couldn't get his words out. On initial evaluation NIHSS was a 4. He scored 2 for questions and 2 for severity of the aphasia. Aphasia seemed to be primarily expressive with some component of receptive. There was no weakness or other deficits. CTH was negative for any acute intracranial pathology. CTA without any large vessel occlusion or high grade stenosis. CTP with Elevated Tmax reported and both posterior cerebral hemispheres and additionally in the left anterior temporal and frontal lobes, unclear if significant.   Dr. Dick Aly thoroughly discussed his PMH with his wife. Patient has opioid dependance due to pelvic fracture from March 2023 but he is in program of titrating down the opioids. He is currently only taking morphine, and the last dose was 15mg yesterday. He is on PHB for seizures and he has been taking it for 40 years, his last seizures were 40 years ago with the grand mal semiology. Otherwise he doesn't have any history of recent surgeries or bleeding. Case discussed with Dr. Dick Aly through telestroke prior to urgent intervention. Risk and benefits of tenecteplase were discussed with patient/family member including risk of symptomatic ICH/death. Decision was made that benefits outweighed risks and tenecteplase was administered. Patient with improvement in exam now back at baseline. 24 hour CTH negative for hemorrhage, stable to stepdown to stroke tele for further workup.       Neurology Stroke Progress Note    INTERVAL HPI/OVERNIGHT EVENTS:  Patient seen and examined patient's language exam appears back at baseline. Able to have fluent conversation without issue.     MEDICATIONS  (STANDING):  aspirin enteric coated 81 milliGRAM(s) Oral daily  buPROPion XL (24-Hour) . 300 milliGRAM(s) Oral daily  clopidogrel Tablet 75 milliGRAM(s) Oral daily  enoxaparin Injectable 40 milliGRAM(s) SubCutaneous every 24 hours  FLUoxetine 80 milliGRAM(s) Oral daily  lamoTRIgine 300 milliGRAM(s) Oral daily  lamoTRIgine 200 milliGRAM(s) Oral at bedtime  levothyroxine 100 MICROGram(s) Oral daily  morphine ER Tablet 15 milliGRAM(s) Oral daily  PHENobarbital 129.6 milliGRAM(s) Oral two times a day  traZODone 50 milliGRAM(s) Oral at bedtime    MEDICATIONS  (PRN):      Allergies    penicillin (Hives)    Intolerances        Vital Signs Last 24 Hrs  T(C): 36.9 (23 Aug 2023 14:33), Max: 36.9 (23 Aug 2023 11:00)  T(F): 98.4 (23 Aug 2023 14:33), Max: 98.4 (23 Aug 2023 11:00)  HR: 73 (23 Aug 2023 16:45) (65 - 86)  BP: 117/71 (23 Aug 2023 16:45) (92/54 - 178/146)  BP(mean): 90 (23 Aug 2023 16:45) (68 - 159)  RR: 20 (23 Aug 2023 16:45) (16 - 37)  SpO2: 99% (23 Aug 2023 16:45) (96% - 100%)    Parameters below as of 23 Aug 2023 16:45  Patient On (Oxygen Delivery Method): room air        Physical exam:  General: No acute distress, awake and alert  Eyes: Anicteric sclerae, moist conjunctivae, see below for CNs  Neck: trachea midline, FROM, supple, no thyromegaly or lymphadenopathy  Cardiovascular: Regular rate and rhythm, no murmurs, rubs, or gallops. No carotid bruits.   Pulmonary: Anterior breath sounds clear bilaterally, no crackles or wheezing. No use of accessory muscles  GI: Abdomen soft, non-distended, non-tender  Extremities: Radial and DP pulses +2, no edema    Neurologic:  -Mental status: Awake, alert, oriented to person, place, and time. Speech is fluent with intact naming, repetition, and comprehension, no dysarthria. Recent and remote memory intact. Follows two and three step commands. Attention/concentration intact. Fund of knowledge appropriate.  -Cranial nerves:   II: Visual fields are full to confrontation.  III, IV, VI: Extraocular movements are intact without nystagmus. Pupils equally round and reactive to light  V:  Facial sensation V1-V3 equal and intact   VII: Face is symmetric with normal smile  Motor: Normal bulk and tone. No pronator drift. Strength bilateral upper extremity 5/5, bilateral lower extremities 5/5.  Rapid alternating movements intact and symmetric  Sensation: Intact to light touch bilaterally. No neglect or extinction on double simultaneous testing.  Coordination: No dysmetria on finger-to-nose  bilaterally   Gait: Narrow gait and steady    LABS:                        11.3   12.91 )-----------( 328      ( 23 Aug 2023 05:29 )             33.2     08-23    133<L>  |  99  |  19  ----------------------------<  127<H>  4.1   |  23  |  0.92    Ca    9.0      23 Aug 2023 05:29  Phos  3.9     08-23  Mg     2.2     08-23    TPro  7.6  /  Alb  4.6  /  TBili  0.5  /  DBili  x   /  AST  26  /  ALT  30  /  AlkPhos  145<H>  08-22    PT/INR - ( 22 Aug 2023 15:34 )   PT: 11.6 sec;   INR: 1.06          PTT - ( 22 Aug 2023 15:34 )  PTT:33.4 sec  Urinalysis Basic - ( 23 Aug 2023 05:29 )    Color: x / Appearance: x / SG: x / pH: x  Gluc: 127 mg/dL / Ketone: x  / Bili: x / Urobili: x   Blood: x / Protein: x / Nitrite: x   Leuk Esterase: x / RBC: x / WBC x   Sq Epi: x / Non Sq Epi: x / Bacteria: x        RADIOLOGY & ADDITIONAL TESTS:    CT Head No Cont (08.23.23 @ 16:39) >  Impression:    No acute intracranial hemorrhage, mass effect or demarcated territorial   infarction. .    Echocardiogram w/ Bubble and Doppler (08.23.23 @ 15:03)   CONCLUSIONS:     1. Normal left ventricular size and systolic function.   2. Normal right ventricular size and systolic function.   3. Normal atria.   4. Injection of agitated saline via a peripheral vein reveals no   evidence of a right-to-left shunt.   5. Mild aortic regurgitation.   6. Aortic sclerosis without significant stenosis.   7. Systolic anterior motion of the mitral valve is seen without evidence   of LVOT obstruction.   8. No evidence of pulmonary hypertension.   9. No pericardial effusion.  10. The proximal ascending aorta measures 3.50 cm (normal 2.6-3.4 cm for   men, 2.3-3.1 cm for women).  11. No prior echo is available for comparison.

## 2023-08-23 NOTE — OCCUPATIONAL THERAPY INITIAL EVALUATION ADULT - ADDITIONAL COMMENTS
Prior to admission, pt performing all ADLs/mobility independently. Pt reporting that he uses SC for stairs, however no other AD use required. Pt has RW at home that was issued after pelvic fx ~4 months ago, however he has not required use of since. Pt is R hand dominant.

## 2023-08-23 NOTE — CONSULT NOTE ADULT - ASSESSMENT
MICU    67 y o Male with PMHx of epilepsy (on PHB with last seizures 40 years ago, semiology: grand mal), HTN, HLD. opiod dependence after a pelvic fracture in March 2023, left shoulder abscess, who presented to [facility] with sudden onset of word-finding difficulties and disorientation. LKW 1400. Stroke code called at [facility] ED. NIHSS initially a 4, improved to a 2 prior to TNK push. CTH was negative for any acute intracranial pathology. CTA without any large vessel occlusion or high grade stenosis. CTP with Elevated Tmax reported and both posterior cerebral hemispheres and additionally in the left anterior temporal and frontal lobes, unclear if significant. Case discussed with Dr. Dick Aly through telestroke prior to urgent intervention. Risk and benefits of tenecteplase were discussed with patient/family member including risk of symptomatic ICH/death. Decision was made that benefits outweighed risks and tenecteplase was administered. Patient transferred to [facility] for further working and post TNK monitoring in the MICU.    NEURO  # post tenecteplase monitoring  -  dysphagia screen, after that start atorvastatin 80 once daily  - goal BP <180/105, notify provider if out of range  - During infusion, neuro exams every 15 minutes.   - Check every 15 minutes for first hour after tenecteplase; every 30 minutes for the next 6 hours; hourly until 24 hours from end of infusion.   - Minimize arterial and venous punctures when able,   - able to draw blood 4hr s/p alteplase  - Keep temp <100F and maintain glucose 140-180.   - Notify provider for "HR >100 or <55 or SaO2 <95%"  - Maintain strict bed rest for 12 hours with HOB <30 degrees  - After 12hr s/p tenecteplase, patient able to ambulate with assist    - Repeat CT head with any acute change in mental status.   - No antiplatelet, anticoagulation, and heparin sq until 24 hour CT head negative for bleed.     - Obtain Hemoglobin A1c, Lipid profile set, and TSH    # Other tests:  - Repeat CT head noncon 24 hours post-tenecteplase to rule out bleed  - MRI brain without contrast   - echo with bubble, may eventually need COY  - PT/OT order  - Stroke education  - Obtain Phenobarbital level    PPX  DVT ppx - SCDs (NO heparin SQ as post tenecteplase protocol)

## 2023-08-23 NOTE — OCCUPATIONAL THERAPY INITIAL EVALUATION ADULT - PERTINENT HX OF CURRENT PROBLEM, REHAB EVAL
67y Male with PMHx of epilepsy (on PHB with last seizures 40 years ago, semiology: grand mal), HTN, HLD. opiod dependence after a pelvic fracture in March 2023, left shoulder abscess, who presented to [facility] with sudden onset of word-finding difficulties and disorientation. LKW 1400. He was speaking normally this morning and was supposed to go out of the house with his wife when she noticed he couldn't get his words out. On initial evaluation NIHSS was a 4. He scored 2 for questions and 2 for severity of the aphasia. Aphasia seemed to be primarily expressive with some component of receptive. There was no weakness or other deficits. CTH was negative for any acute intracranial pathology. CTA without any large vessel occlusion or high grade stenosis. CTP with Elevated Tmax reported and both posterior cerebral hemispheres and additionally in the left anterior temporal and frontal lobes, unclear if significant. Dr. Dick Aly thoroughly discussed his PMH with his wife. Patient has opioid dependance due to pelvic fracture from March 2023 but he is in program of titrating down the opioids. He is currently only taking morphine, and the last dose was 15mg yesterday. He is on PHB for seizures and he has been taking it for 40 years, his last seizures were 40 years ago with the grand mal semiology. Otherwise he doesn't have any history of recent surgeries or bleeding. Case discussed with Dr. Dick Aly through telestroke prior to urgent intervention. Risk and benefits of tenecteplase were discussed with patient/family member including risk of symptomatic ICH/death. Decision was made that benefits outweighed risks and tenecteplase was administered. Patient with improvement in exam now back at baseline. 24 hour CTH negative for hemorrhage, stable to stepdown to stroke tele for further workup.

## 2023-08-23 NOTE — PHYSICAL THERAPY INITIAL EVALUATION ADULT - MODALITIES TREATMENT COMMENTS
R hand dominant; (L) hand  5/5, (R) hand  5/5. CN Testing: B/L Frontalis intact; B/L buccinator intact; smile symmetrical; tongue protrusion at midline; B/L eyes open/close intact; Shoulder elevation: intact bilaterally; Vision H-Test: bilateral tracking and smooth pursuit intact; Convergence/Divergence: intact; Vision Quadrant Test: intact bilaterally

## 2023-08-23 NOTE — OCCUPATIONAL THERAPY INITIAL EVALUATION ADULT - MODALITIES TREATMENT COMMENTS
Pt able to ambulate in room/hallway independenlty, initially using RW, and able to progress to no AD. Pt able to ascend/descend 1 FOS independently w/o use of AD. Pt demo mild unsteadiness, however no LOB observed throughout.

## 2023-08-23 NOTE — PROGRESS NOTE ADULT - SUBJECTIVE AND OBJECTIVE BOX
INTERVAL HPI/OVERNIGHT EVENTS: MU     SUBJECTIVE: Patient seen and examined at bedside. Doing well with NAD. States that he has been feeling better and is feeling stronger. Denies, dizziness, n/v, chest pain, shortness of breath, light sensitivity or headaches     ROS: All negative except as listed above.    VITAL SIGNS:  ICU Vital Signs Last 24 Hrs  T(C): 36.9 (23 Aug 2023 11:00), Max: 36.9 (23 Aug 2023 11:00)  T(F): 98.4 (23 Aug 2023 11:00), Max: 98.4 (23 Aug 2023 11:00)  HR: 73 (23 Aug 2023 10:00) (66 - 88)  BP: 92/54 (23 Aug 2023 10:00) (92/54 - 178/146)  BP(mean): 68 (23 Aug 2023 10:00) (68 - 159)  ABP: --  ABP(mean): --  RR: 20 (23 Aug 2023 10:00) (16 - 37)  SpO2: 98% (23 Aug 2023 10:00) (94% - 100%)    O2 Parameters below as of 23 Aug 2023 10:00  Patient On (Oxygen Delivery Method): room air            Plateau pressure:   P/F ratio:     08-22 @ 07:01  -  08-23 @ 07:00  --------------------------------------------------------  IN: 100 mL / OUT: 550 mL / NET: -450 mL      CAPILLARY BLOOD GLUCOSE      POCT Blood Glucose.: 135 mg/dL (22 Aug 2023 23:06)      ECG: reviewed.    PHYSICAL EXAM:    GENERAL: NAD, lying in bed comfortably  HEAD:  Atraumatic, normocephalic  EYES: EOMI, PERRLA, conjunctiva and sclera clear  NECK: Supple, trachea midline, no JVD  HEART: Regular rate and rhythm, no murmurs, rubs, or gallops  LUNGS: Unlabored respirations.  Clear to auscultation bilaterally, no crackles, wheezing, or rhonchi  ABDOMEN: Soft, nontender, nondistended, +BS  EXTREMITIES: 2+ peripheral pulses bilaterally, cap refill<2 secs. No clubbing, cyanosis, or edema  NERVOUS SYSTEM:  A&Ox3, following commands, moving all extremities, no focal deficits   SKIN: No rashes or lesions    MEDICATIONS:  MEDICATIONS  (STANDING):  buPROPion XL (24-Hour) . 300 milliGRAM(s) Oral daily  FLUoxetine 80 milliGRAM(s) Oral daily  lamoTRIgine 200 milliGRAM(s) Oral at bedtime  lamoTRIgine 300 milliGRAM(s) Oral daily  levothyroxine 100 MICROGram(s) Oral daily  morphine ER Tablet 15 milliGRAM(s) Oral daily  PHENobarbital 129.6 milliGRAM(s) Oral two times a day  traZODone 50 milliGRAM(s) Oral at bedtime    MEDICATIONS  (PRN):      ALLERGIES:  Allergies    penicillin (Hives)    Intolerances        LABS:                        11.3   12.91 )-----------( 328      ( 23 Aug 2023 05:29 )             33.2     08-23    133<L>  |  99  |  19  ----------------------------<  127<H>  4.1   |  23  |  0.92    Ca    9.0      23 Aug 2023 05:29  Phos  3.9     08-23  Mg     2.2     08-23    TPro  7.6  /  Alb  4.6  /  TBili  0.5  /  DBili  x   /  AST  26  /  ALT  30  /  AlkPhos  145<H>  08-22    PT/INR - ( 22 Aug 2023 15:34 )   PT: 11.6 sec;   INR: 1.06          PTT - ( 22 Aug 2023 15:34 )  PTT:33.4 sec  Urinalysis Basic - ( 23 Aug 2023 05:29 )    Color: x / Appearance: x / SG: x / pH: x  Gluc: 127 mg/dL / Ketone: x  / Bili: x / Urobili: x   Blood: x / Protein: x / Nitrite: x   Leuk Esterase: x / RBC: x / WBC x   Sq Epi: x / Non Sq Epi: x / Bacteria: x      ABG:      vBG:    Micro:    Culture - Blood (collected 08-11-23 @ 14:24)  Source: .Blood Blood-Peripheral  Final Report (08-17-23 @ 04:00):    No growth at 5 days    Culture - Blood (collected 08-11-23 @ 14:24)  Source: .Blood Blood-Peripheral  Final Report (08-17-23 @ 04:00):    No growth at 5 days          RADIOLOGY & ADDITIONAL TESTS: Reviewed. *******TRANSFER FROM MICU TO STROKE TELE**********    HOSPITAL COURSE:  67y Male with PMHx of epilepsy (on PHB with last seizures 40 years ago, semiology: grand mal), HTN, HLD. opiod dependence after a pelvic fracture in March 2023, left shoulder abscess, who presented to [facility] with sudden onset of word-finding difficulties and disorientation. LKW 1400. On initial evaluation NIHSS was a 4. He scored 2 for questions and 2 for severity of the aphasia. Aphasia seemed to be primarily expressive with some component of receptive. There was no weakness or other deficits. CTH was negative for any acute intracranial pathology. CTA without any large vessel occlusion or high grade stenosis. CTP with Elevated Tmax reported and both posterior cerebral hemispheres and additionally in the left anterior temporal and frontal lobes, unclear if significant.  Risk and benefits of tenecteplase were discussed with patient/family member including risk of symptomatic ICH/death. Decision was made that benefits outweighed risks and tenecteplase was administered. Patient transferred to [facility] for further working and post TNK monitoring in the MICU. CT 8/23 w/ no acute intracranial hemorrhage, mass effect or demarcated territorial infarction. The patient also received a TTE w/ bubble and doppler that was largely unremarkable, without PFO and normal LV size and function. Patient stable for transfer off of MICU service.      INTERVAL HPI/OVERNIGHT EVENTS: MU     SUBJECTIVE: Patient seen and examined at bedside. Doing well with NAD. States that he has been feeling better and is feeling stronger. Denies, dizziness, n/v, chest pain, shortness of breath, light sensitivity or headaches     ROS: All negative except as listed above.    VITAL SIGNS:  ICU Vital Signs Last 24 Hrs  T(C): 36.9 (23 Aug 2023 11:00), Max: 36.9 (23 Aug 2023 11:00)  T(F): 98.4 (23 Aug 2023 11:00), Max: 98.4 (23 Aug 2023 11:00)  HR: 73 (23 Aug 2023 10:00) (66 - 88)  BP: 92/54 (23 Aug 2023 10:00) (92/54 - 178/146)  BP(mean): 68 (23 Aug 2023 10:00) (68 - 159)  ABP: --  ABP(mean): --  RR: 20 (23 Aug 2023 10:00) (16 - 37)  SpO2: 98% (23 Aug 2023 10:00) (94% - 100%)    O2 Parameters below as of 23 Aug 2023 10:00  Patient On (Oxygen Delivery Method): room air            Plateau pressure:   P/F ratio:     08-22 @ 07:01  -  08-23 @ 07:00  --------------------------------------------------------  IN: 100 mL / OUT: 550 mL / NET: -450 mL      CAPILLARY BLOOD GLUCOSE      POCT Blood Glucose.: 135 mg/dL (22 Aug 2023 23:06)      ECG: reviewed.    PHYSICAL EXAM:    GENERAL: NAD, lying in bed comfortably  HEAD:  Atraumatic, normocephalic  EYES: EOMI, PERRLA, conjunctiva and sclera clear  NECK: Supple, trachea midline, no JVD  HEART: Regular rate and rhythm, no murmurs, rubs, or gallops  LUNGS: Unlabored respirations.  Clear to auscultation bilaterally, no crackles, wheezing, or rhonchi  ABDOMEN: Soft, nontender, nondistended, +BS  EXTREMITIES: 2+ peripheral pulses bilaterally, cap refill<2 secs. No clubbing, cyanosis, or edema  NERVOUS SYSTEM:  A&Ox3, following commands, moving all extremities, no focal deficits   SKIN: No rashes or lesions    MEDICATIONS:  MEDICATIONS  (STANDING):  buPROPion XL (24-Hour) . 300 milliGRAM(s) Oral daily  FLUoxetine 80 milliGRAM(s) Oral daily  lamoTRIgine 200 milliGRAM(s) Oral at bedtime  lamoTRIgine 300 milliGRAM(s) Oral daily  levothyroxine 100 MICROGram(s) Oral daily  morphine ER Tablet 15 milliGRAM(s) Oral daily  PHENobarbital 129.6 milliGRAM(s) Oral two times a day  traZODone 50 milliGRAM(s) Oral at bedtime    MEDICATIONS  (PRN):      ALLERGIES:  Allergies    penicillin (Hives)    Intolerances        LABS:                        11.3   12.91 )-----------( 328      ( 23 Aug 2023 05:29 )             33.2     08-23    133<L>  |  99  |  19  ----------------------------<  127<H>  4.1   |  23  |  0.92    Ca    9.0      23 Aug 2023 05:29  Phos  3.9     08-23  Mg     2.2     08-23    TPro  7.6  /  Alb  4.6  /  TBili  0.5  /  DBili  x   /  AST  26  /  ALT  30  /  AlkPhos  145<H>  08-22    PT/INR - ( 22 Aug 2023 15:34 )   PT: 11.6 sec;   INR: 1.06          PTT - ( 22 Aug 2023 15:34 )  PTT:33.4 sec  Urinalysis Basic - ( 23 Aug 2023 05:29 )    Color: x / Appearance: x / SG: x / pH: x  Gluc: 127 mg/dL / Ketone: x  / Bili: x / Urobili: x   Blood: x / Protein: x / Nitrite: x   Leuk Esterase: x / RBC: x / WBC x   Sq Epi: x / Non Sq Epi: x / Bacteria: x      ABG:      vBG:    Micro:    Culture - Blood (collected 08-11-23 @ 14:24)  Source: .Blood Blood-Peripheral  Final Report (08-17-23 @ 04:00):    No growth at 5 days    Culture - Blood (collected 08-11-23 @ 14:24)  Source: .Blood Blood-Peripheral  Final Report (08-17-23 @ 04:00):    No growth at 5 days          RADIOLOGY & ADDITIONAL TESTS: Reviewed.

## 2023-08-23 NOTE — PHYSICAL THERAPY INITIAL EVALUATION ADULT - PERTINENT HX OF CURRENT PROBLEM, REHAB EVAL
67y Male with PMHx of epilepsy (on PHB with last seizures 40 years ago, semiology: grand mal), HTN, HLD. opiod dependence after a pelvic fracture in March 2023, left shoulder abscess, who presented to [facility] with sudden onset of word-finding difficulties and disorientation. LKW 1400. He was speaking normally this morning and was supposed to go out of the house with his wife when she noticed he couldn't get his words out. On initial evaluation NIHSS was a 4. He scored 2 for questions and 2 for severity of the aphasia. Aphasia seemed to be primarily expressive with some component of receptive. There was no weakness or other deficits. CTH was negative for any acute intracranial pathology. CTA without any large vessel occlusion or high grade stenosis. CTP with Elevated Tmax reported and both posterior cerebral hemispheres and additionally in the left anterior temporal and frontal lobes, unclear if significant.   Dr. Dick Aly thoroughly discussed his PMH with his wife. Patient has opioid dependance due to pelvic fracture from March 2023 but he is in program of titrating down the opioids. He is currently only taking morphine, and the last dose was 15mg yesterday. He is on PHB for seizures and he has been taking it for 40 years, his last seizures were 40 years ago with the grand mal semiology. Otherwise he doesn't have any history of recent surgeries or bleeding. Case discussed with Dr. Dick Aly through telestroke prior to urgent intervention. Risk and benefits of tenecteplase were discussed with patient/family member including risk of symptomatic ICH/death. Decision was made that benefits outweighed risks and tenecteplase was administered.

## 2023-08-23 NOTE — OCCUPATIONAL THERAPY INITIAL EVALUATION ADULT - GENERAL OBSERVATIONS, REHAB EVAL
OT IE completed. MRS 1. Pt admitted to [facility] for stroke evaluation. Orders received, chart reviewed, pt cleared for OT by CARITO Rojas. Pt received semi supine in bed, NAD, +heplock, +tele. Pt A&Ox4, agreeable to OT, and tolerated session well.

## 2023-08-24 ENCOUNTER — TRANSCRIPTION ENCOUNTER (OUTPATIENT)
Age: 68
End: 2023-08-24

## 2023-08-24 LAB
ALBUMIN SERPL ELPH-MCNC: 4 G/DL — SIGNIFICANT CHANGE UP (ref 3.3–5)
ALP SERPL-CCNC: 113 U/L — SIGNIFICANT CHANGE UP (ref 40–120)
ALT FLD-CCNC: 13 U/L — SIGNIFICANT CHANGE UP (ref 10–45)
ANION GAP SERPL CALC-SCNC: 8 MMOL/L — SIGNIFICANT CHANGE UP (ref 5–17)
ANION GAP SERPL CALC-SCNC: 9 MMOL/L — SIGNIFICANT CHANGE UP (ref 5–17)
APPEARANCE UR: CLEAR — SIGNIFICANT CHANGE UP
AST SERPL-CCNC: 14 U/L — SIGNIFICANT CHANGE UP (ref 10–40)
BILIRUB SERPL-MCNC: 0.3 MG/DL — SIGNIFICANT CHANGE UP (ref 0.2–1.2)
BILIRUB UR-MCNC: NEGATIVE — SIGNIFICANT CHANGE UP
BUN SERPL-MCNC: 15 MG/DL — SIGNIFICANT CHANGE UP (ref 7–23)
BUN SERPL-MCNC: 22 MG/DL — SIGNIFICANT CHANGE UP (ref 7–23)
CALCIUM SERPL-MCNC: 8.4 MG/DL — SIGNIFICANT CHANGE UP (ref 8.4–10.5)
CALCIUM SERPL-MCNC: 8.8 MG/DL — SIGNIFICANT CHANGE UP (ref 8.4–10.5)
CHLORIDE SERPL-SCNC: 96 MMOL/L — SIGNIFICANT CHANGE UP (ref 96–108)
CHLORIDE SERPL-SCNC: 98 MMOL/L — SIGNIFICANT CHANGE UP (ref 96–108)
CO2 SERPL-SCNC: 23 MMOL/L — SIGNIFICANT CHANGE UP (ref 22–31)
CO2 SERPL-SCNC: 24 MMOL/L — SIGNIFICANT CHANGE UP (ref 22–31)
COLOR SPEC: YELLOW — SIGNIFICANT CHANGE UP
CREAT ?TM UR-MCNC: 62 MG/DL — SIGNIFICANT CHANGE UP
CREAT SERPL-MCNC: 0.73 MG/DL — SIGNIFICANT CHANGE UP (ref 0.5–1.3)
CREAT SERPL-MCNC: 0.77 MG/DL — SIGNIFICANT CHANGE UP (ref 0.5–1.3)
DIFF PNL FLD: NEGATIVE — SIGNIFICANT CHANGE UP
EGFR: 97 ML/MIN/1.73M2 — SIGNIFICANT CHANGE UP
EGFR: 98 ML/MIN/1.73M2 — SIGNIFICANT CHANGE UP
GLUCOSE SERPL-MCNC: 112 MG/DL — HIGH (ref 70–99)
GLUCOSE SERPL-MCNC: 97 MG/DL — SIGNIFICANT CHANGE UP (ref 70–99)
GLUCOSE UR QL: NEGATIVE — SIGNIFICANT CHANGE UP
HCT VFR BLD CALC: 27.7 % — LOW (ref 39–50)
HGB BLD-MCNC: 9.3 G/DL — LOW (ref 13–17)
KETONES UR-MCNC: NEGATIVE — SIGNIFICANT CHANGE UP
LEUKOCYTE ESTERASE UR-ACNC: NEGATIVE — SIGNIFICANT CHANGE UP
MAGNESIUM SERPL-MCNC: 1.8 MG/DL — SIGNIFICANT CHANGE UP (ref 1.6–2.6)
MCHC RBC-ENTMCNC: 29.4 PG — SIGNIFICANT CHANGE UP (ref 27–34)
MCHC RBC-ENTMCNC: 33.6 GM/DL — SIGNIFICANT CHANGE UP (ref 32–36)
MCV RBC AUTO: 87.7 FL — SIGNIFICANT CHANGE UP (ref 80–100)
NITRITE UR-MCNC: NEGATIVE — SIGNIFICANT CHANGE UP
NRBC # BLD: 0 /100 WBCS — SIGNIFICANT CHANGE UP (ref 0–0)
OSMOLALITY SERPL: 275 MOSM/KG — LOW (ref 280–301)
OSMOLALITY UR: 365 MOSM/KG — SIGNIFICANT CHANGE UP (ref 300–900)
PH UR: 6 — SIGNIFICANT CHANGE UP (ref 5–8)
PHOSPHATE SERPL-MCNC: 3.4 MG/DL — SIGNIFICANT CHANGE UP (ref 2.5–4.5)
PLATELET # BLD AUTO: 278 K/UL — SIGNIFICANT CHANGE UP (ref 150–400)
POTASSIUM SERPL-MCNC: 3.9 MMOL/L — SIGNIFICANT CHANGE UP (ref 3.5–5.3)
POTASSIUM SERPL-MCNC: 4 MMOL/L — SIGNIFICANT CHANGE UP (ref 3.5–5.3)
POTASSIUM SERPL-SCNC: 3.9 MMOL/L — SIGNIFICANT CHANGE UP (ref 3.5–5.3)
POTASSIUM SERPL-SCNC: 4 MMOL/L — SIGNIFICANT CHANGE UP (ref 3.5–5.3)
POTASSIUM UR-SCNC: 24 MMOL/L — SIGNIFICANT CHANGE UP
PROT ?TM UR-MCNC: 11 MG/DL — SIGNIFICANT CHANGE UP (ref 0–12)
PROT SERPL-MCNC: 6.3 G/DL — SIGNIFICANT CHANGE UP (ref 6–8.3)
PROT UR-MCNC: NEGATIVE MG/DL — SIGNIFICANT CHANGE UP
PROT/CREAT UR-RTO: 0.2 RATIO — SIGNIFICANT CHANGE UP (ref 0–0.2)
RBC # BLD: 3.16 M/UL — LOW (ref 4.2–5.8)
RBC # FLD: 13.6 % — SIGNIFICANT CHANGE UP (ref 10.3–14.5)
SODIUM SERPL-SCNC: 128 MMOL/L — LOW (ref 135–145)
SODIUM SERPL-SCNC: 130 MMOL/L — LOW (ref 135–145)
SODIUM UR-SCNC: <20 MMOL/L — SIGNIFICANT CHANGE UP
SP GR SPEC: <=1.005 — SIGNIFICANT CHANGE UP (ref 1–1.03)
UROBILINOGEN FLD QL: 0.2 E.U./DL — SIGNIFICANT CHANGE UP
UUN UR-MCNC: 687 MG/DL — SIGNIFICANT CHANGE UP
WBC # BLD: 7.85 K/UL — SIGNIFICANT CHANGE UP (ref 3.8–10.5)
WBC # FLD AUTO: 7.85 K/UL — SIGNIFICANT CHANGE UP (ref 3.8–10.5)

## 2023-08-24 PROCEDURE — 95720 EEG PHY/QHP EA INCR W/VEEG: CPT

## 2023-08-24 PROCEDURE — 99223 1ST HOSP IP/OBS HIGH 75: CPT

## 2023-08-24 PROCEDURE — 99232 SBSQ HOSP IP/OBS MODERATE 35: CPT | Mod: 95

## 2023-08-24 PROCEDURE — 93312 ECHO TRANSESOPHAGEAL: CPT | Mod: 26

## 2023-08-24 PROCEDURE — 93320 DOPPLER ECHO COMPLETE: CPT | Mod: 26

## 2023-08-24 RX ORDER — ACETAMINOPHEN 500 MG
650 TABLET ORAL ONCE
Refills: 0 | Status: COMPLETED | OUTPATIENT
Start: 2023-08-24 | End: 2023-08-24

## 2023-08-24 RX ORDER — ATORVASTATIN CALCIUM 80 MG/1
40 TABLET, FILM COATED ORAL AT BEDTIME
Refills: 0 | Status: DISCONTINUED | OUTPATIENT
Start: 2023-08-24 | End: 2023-08-26

## 2023-08-24 RX ADMIN — LAMOTRIGINE 300 MILLIGRAM(S): 25 TABLET, ORALLY DISINTEGRATING ORAL at 14:24

## 2023-08-24 RX ADMIN — BUPROPION HYDROCHLORIDE 300 MILLIGRAM(S): 150 TABLET, EXTENDED RELEASE ORAL at 14:26

## 2023-08-24 RX ADMIN — Medication 650 MILLIGRAM(S): at 07:26

## 2023-08-24 RX ADMIN — MORPHINE SULFATE 15 MILLIGRAM(S): 50 CAPSULE, EXTENDED RELEASE ORAL at 14:31

## 2023-08-24 RX ADMIN — Medication 129.6 MILLIGRAM(S): at 17:58

## 2023-08-24 RX ADMIN — Medication 650 MILLIGRAM(S): at 06:26

## 2023-08-24 RX ADMIN — Medication 50 MILLIGRAM(S): at 21:26

## 2023-08-24 RX ADMIN — Medication 129.6 MILLIGRAM(S): at 06:26

## 2023-08-24 RX ADMIN — LAMOTRIGINE 200 MILLIGRAM(S): 25 TABLET, ORALLY DISINTEGRATING ORAL at 21:26

## 2023-08-24 RX ADMIN — CLOPIDOGREL BISULFATE 75 MILLIGRAM(S): 75 TABLET, FILM COATED ORAL at 14:23

## 2023-08-24 RX ADMIN — Medication 650 MILLIGRAM(S): at 00:31

## 2023-08-24 RX ADMIN — Medication 80 MILLIGRAM(S): at 14:24

## 2023-08-24 RX ADMIN — Medication 81 MILLIGRAM(S): at 14:24

## 2023-08-24 RX ADMIN — Medication 650 MILLIGRAM(S): at 01:31

## 2023-08-24 RX ADMIN — Medication 100 MICROGRAM(S): at 06:26

## 2023-08-24 RX ADMIN — ENOXAPARIN SODIUM 40 MILLIGRAM(S): 100 INJECTION SUBCUTANEOUS at 17:58

## 2023-08-24 NOTE — DISCHARGE NOTE PROVIDER - NSDCCPTREATMENT_GEN_ALL_CORE_FT
PRINCIPAL PROCEDURE  Procedure: Insertion, implantable loop recorder  Findings and Treatment: Plan of Care  -Please keep Tegaderm on for 24 hours and then should be removed by the patient.    - Any bleeding, discharge or issues at procedure site please call [phone].    - Please follow up with EP in 2-3 weeks (call [phone] for an appointment unless already scheduled)  - No submerging under water until the incision heals.  The patient may shower 24 hours after implant.  Please don’t rub or pick glue off and let it fall off on its own.  Avoid ointments or lotions at the incision site.    - You will be given an ID card and an information booklet.  There will be a phone number to call if you have any questions about remote monitoring please call the company that makes your ILR.    o  	[employer] [phone]  - For any clinical questions please call [phone]

## 2023-08-24 NOTE — DISCHARGE NOTE PROVIDER - NSDCCPCAREPLAN_GEN_ALL_CORE_FT
PRINCIPAL DISCHARGE DIAGNOSIS  Diagnosis: Stroke  Assessment and Plan of Treatment: During this hospital admission, you had an ischemic stroke. During an ischemic stroke, blood stops flowing to part of your brain because of a blockage in the blood vessel. This can damage areas in the brain that control other parts of the body.  Please take your aspirin and plavix blood thinning for 21 days and then only aspirin. Continue to take Atorvastatin for cholesterol medication/blood vessel protection as prescribed to prevent further strokes. Do not skip doses and do not run low on your medication. If you run low on your medication, please contact your doctor.  You will follow up outpatient with the stroke Nurse Practitioner.  Doing your regular tasks may be difficult after you've had a stroke, but you can learn new ways to manage your daily activities. In fact, doing daily activities may help you to regain muscle strength. Be patient, give yourself time to adjust, and appreciate the progress you make. For example, when showering or bathing, test the water temperature with a hand or foot that was not affected by the stroke, use grab bars, a shower seat, a hand-held showerhead, etc. It is normal to feel fatigue after a stroke, while some days may be worse than others, you will continue to improve.  Call 911 right away if you have any of the following symptoms of another stroke:  B: Balance: Sudden: Dizziness, loss of balance, or a sense of falling, difficulty with coordinating movement  E: Eyes: Sudden double vision or trouble seeing in one or both eyes  F: Face: Sudden uneven face  A: Arms (Legs): Sudden weakness, tingling, or loss of feeling on one side of your face or body  S: Speech: Sudden trouble talking or slurred speech, sudden difficulty understanding others  T: Time: Please call 911 right away and go to the emergency room  •Sudden, severe headache  •Blackouts or seizures       PRINCIPAL DISCHARGE DIAGNOSIS  Diagnosis: Stroke  Assessment and Plan of Treatment: During this hospital admission, you had an ischemic stroke. During an ischemic stroke, blood stops flowing to part of your brain because of a blockage in the blood vessel. This can damage areas in the brain that control other parts of the body.  Please take your aspirin and plavix blood thinning for 21 days and then only aspirin. Continue to take Atorvastatin for cholesterol medication/blood vessel protection as prescribed to prevent further strokes. Do not skip doses and do not run low on your medication. If you run low on your medication, please contact your doctor.  You will follow up outpatient with the stroke Nurse Practitioner.  Doing your regular tasks may be difficult after you've had a stroke, but you can learn new ways to manage your daily activities. In fact, doing daily activities may help you to regain muscle strength. Be patient, give yourself time to adjust, and appreciate the progress you make. For example, when showering or bathing, test the water temperature with a hand or foot that was not affected by the stroke, use grab bars, a shower seat, a hand-held showerhead, etc. It is normal to feel fatigue after a stroke, while some days may be worse than others, you will continue to improve.  Call 911 right away if you have any of the following symptoms of another stroke:  B: Balance: Sudden: Dizziness, loss of balance, or a sense of falling, difficulty with coordinating movement  E: Eyes: Sudden double vision or trouble seeing in one or both eyes  F: Face: Sudden uneven face  A: Arms (Legs): Sudden weakness, tingling, or loss of feeling on one side of your face or body  S: Speech: Sudden trouble talking or slurred speech, sudden difficulty understanding others  T: Time: Please call 911 right away and go to the emergency room  •Sudden, severe headache  •Blackouts or seizures        SECONDARY DISCHARGE DIAGNOSES  Diagnosis: Hyponatremia  Assessment and Plan of Treatment: You were found to have low levels of sodium in your blood. You have a history of low sodium, and you ahve not had any symptoms from this. Please follow up with your primary care provider within a week of discharge to address this issue.     PRINCIPAL DISCHARGE DIAGNOSIS  Diagnosis: Stroke  Assessment and Plan of Treatment: During this hospital admission, you had an ischemic stroke. During an ischemic stroke, blood stops flowing to part of your brain because of a blockage in the blood vessel. This can damage areas in the brain that control other parts of the body.  Please take your aspirin and plavix blood thinning for 21 days and then only aspirin. Continue to take Atorvastatin for cholesterol medication/blood vessel protection as prescribed to prevent further strokes. Do not skip doses and do not run low on your medication. If you run low on your medication, please contact your doctor.  You will follow up outpatient with the stroke Nurse Practitioner.  Doing your regular tasks may be difficult after you've had a stroke, but you can learn new ways to manage your daily activities. In fact, doing daily activities may help you to regain muscle strength. Be patient, give yourself time to adjust, and appreciate the progress you make. For example, when showering or bathing, test the water temperature with a hand or foot that was not affected by the stroke, use grab bars, a shower seat, a hand-held showerhead, etc. It is normal to feel fatigue after a stroke, while some days may be worse than others, you will continue to improve.  Call 911 right away if you have any of the following symptoms of another stroke:  B: Balance: Sudden: Dizziness, loss of balance, or a sense of falling, difficulty with coordinating movement  E: Eyes: Sudden double vision or trouble seeing in one or both eyes  F: Face: Sudden uneven face  A: Arms (Legs): Sudden weakness, tingling, or loss of feeling on one side of your face or body  S: Speech: Sudden trouble talking or slurred speech, sudden difficulty understanding others  T: Time: Please call 911 right away and go to the emergency room  •Sudden, severe headache  •Blackouts or seizures        SECONDARY DISCHARGE DIAGNOSES  Diagnosis: Hyponatremia  Assessment and Plan of Treatment: You were found to have low levels of sodium in your blood. You have a history of low sodium, and you ahve not had any symptoms from this. Please follow up with your primary care provider within a week of discharge to address this issue. Your sodium level on discharge was 133    Diagnosis: Anemia  Assessment and Plan of Treatment: You were found to have anemia or low hemoglobin. Your counts on discharge were stable at approximatley 8.8 but was 13 when you came in. Your vital signs were stable and we saw no signs of bleeding but we strongly reccomend that you follow up with your primary care physician for further care.     PRINCIPAL DISCHARGE DIAGNOSIS  Diagnosis: Stroke  Assessment and Plan of Treatment: During this hospital admission, you had an ischemic stroke. During an ischemic stroke, blood stops flowing to part of your brain because of a blockage in the blood vessel. This can damage areas in the brain that control other parts of the body.  Please take your aspirin and continue to take Atorvastatin for cholesterol medication/blood vessel protection as prescribed to prevent further strokes. Do not skip doses and do not run low on your medication. If you run low on your medication, please contact your doctor. We are not prescribing plavix given your low blood counts  You will follow up outpatient with the stroke Nurse Practitioner.  Doing your regular tasks may be difficult after you've had a stroke, but you can learn new ways to manage your daily activities. In fact, doing daily activities may help you to regain muscle strength. Be patient, give yourself time to adjust, and appreciate the progress you make. For example, when showering or bathing, test the water temperature with a hand or foot that was not affected by the stroke, use grab bars, a shower seat, a hand-held showerhead, etc. It is normal to feel fatigue after a stroke, while some days may be worse than others, you will continue to improve.  Call 911 right away if you have any of the following symptoms of another stroke:  B: Balance: Sudden: Dizziness, loss of balance, or a sense of falling, difficulty with coordinating movement  E: Eyes: Sudden double vision or trouble seeing in one or both eyes  F: Face: Sudden uneven face  A: Arms (Legs): Sudden weakness, tingling, or loss of feeling on one side of your face or body  S: Speech: Sudden trouble talking or slurred speech, sudden difficulty understanding others  T: Time: Please call 911 right away and go to the emergency room  •Sudden, severe headache  •Blackouts or seizures        SECONDARY DISCHARGE DIAGNOSES  Diagnosis: Hyponatremia  Assessment and Plan of Treatment: You were found to have low levels of sodium in your blood. You have a history of low sodium, and you ahve not had any symptoms from this. Please follow up with your primary care provider within a week of discharge to address this issue. Your sodium level on discharge was 133    Diagnosis: Anemia  Assessment and Plan of Treatment: You were found to have anemia or low hemoglobin. Your counts on discharge were stable at approximatley 8.8 but was 13 when you came in. Your vital signs were stable and we saw no signs of bleeding but we strongly reccomend that you follow up with your primary care physician for further care.

## 2023-08-24 NOTE — DISCHARGE NOTE PROVIDER - NSDCFUADDAPPT_GEN_ALL_CORE_FT
Follow up in stroke clinic with RANDOLPH Dick.     Please call [phone] to schedule an appoint with our cardiothoracic team for follow up of dilated aorta seen on echo.      Follow up in stroke clinic with RANDOLPH Dick.     Please call [phone] to schedule an appointment with our cardiothoracic team for follow up of dilated aorta seen on echo.

## 2023-08-24 NOTE — DISCHARGE NOTE PROVIDER - NSDCMRMEDTOKEN_GEN_ALL_CORE_FT
atorvastatin 40 mg oral tablet: 1 tab(s) orally once a day (at bedtime)  buPROPion 300 mg/24 hours (XL) oral tablet, extended release: 1 tab(s) orally once a day  FLUoxetine 40 mg oral capsule: 2 cap(s) orally once a day  lamoTRIgine 100 mg oral tablet: 3 tab(s) orally once a day (in the morning) Take 3 tabs in the morning, 2 tabs a night  morphine 15 mg/12 hr oral tablet, extended release: 1 tab(s) orally once a day In the evening for 7 days from 8/21  PHENobarbital 64.8 mg oral tablet: 2 tab(s) orally 2 times a day  Synthroid 100 mcg (0.1 mg) oral tablet: 1 tab(s) orally once a day  traZODone 50 mg oral tablet: 1 tab(s) orally once a day (at bedtime)  valsartan 160 mg oral capsule: 1 cap(s) orally once a day   aspirin 81 mg oral delayed release tablet: 1 tab(s) orally once a day  atorvastatin 40 mg oral tablet: 1 tab(s) orally once a day (at bedtime)  buPROPion 300 mg/24 hours (XL) oral tablet, extended release: 1 tab(s) orally once a day  clopidogrel 75 mg oral tablet: 1 tab(s) orally once a day  FLUoxetine 40 mg oral capsule: 2 cap(s) orally once a day  lamoTRIgine 100 mg oral tablet: 3 tab(s) orally once a day (in the morning) Take 3 tabs in the morning, 2 tabs a night  morphine 15 mg/12 hr oral tablet, extended release: 1 tab(s) orally once a day In the evening for 7 days from 8/21  PHENobarbital 64.8 mg oral tablet: 2 tab(s) orally 2 times a day  Synthroid 100 mcg (0.1 mg) oral tablet: 1 tab(s) orally once a day  traZODone 50 mg oral tablet: 1 tab(s) orally once a day (at bedtime)  valsartan 40 mg oral tablet: 1 tab(s) orally once a day   aspirin 81 mg oral delayed release tablet: 1 tab(s) orally once a day  atorvastatin 40 mg oral tablet: 1 tab(s) orally once a day (at bedtime)  buPROPion 300 mg/24 hours (XL) oral tablet, extended release: 1 tab(s) orally once a day  FLUoxetine 40 mg oral capsule: 2 cap(s) orally once a day  lamoTRIgine 100 mg oral tablet: 3 tab(s) orally once a day (in the morning) Take 3 tabs in the morning, 2 tabs a night  morphine 15 mg/12 hr oral tablet, extended release: 1 tab(s) orally once a day In the evening for 7 days from 8/21  PHENobarbital 64.8 mg oral tablet: 2 tab(s) orally 2 times a day  Synthroid 100 mcg (0.1 mg) oral tablet: 1 tab(s) orally once a day  traZODone 50 mg oral tablet: 1 tab(s) orally once a day (at bedtime)  valsartan 40 mg oral tablet: 1 tab(s) orally once a day

## 2023-08-24 NOTE — PROGRESS NOTE ADULT - SUBJECTIVE AND OBJECTIVE BOX
Neurology Stroke Progress Note    INTERVAL HPI/OVERNIGHT EVENTS:  Patient seen and examined.  number ______ used.    MEDICATIONS  (STANDING):  aspirin enteric coated 81 milliGRAM(s) Oral daily  buPROPion XL (24-Hour) . 300 milliGRAM(s) Oral daily  clopidogrel Tablet 75 milliGRAM(s) Oral daily  enoxaparin Injectable 40 milliGRAM(s) SubCutaneous every 24 hours  FLUoxetine 80 milliGRAM(s) Oral daily  lamoTRIgine 300 milliGRAM(s) Oral daily  lamoTRIgine 200 milliGRAM(s) Oral at bedtime  levothyroxine 100 MICROGram(s) Oral daily  morphine ER Tablet 15 milliGRAM(s) Oral daily  PHENobarbital 129.6 milliGRAM(s) Oral two times a day  traZODone 50 milliGRAM(s) Oral at bedtime    MEDICATIONS  (PRN):      Allergies    penicillin (Hives)    Intolerances        Vital Signs Last 24 Hrs  T(C): 36.8 (24 Aug 2023 18:03), Max: 37.1 (23 Aug 2023 20:36)  T(F): 98.2 (24 Aug 2023 18:03), Max: 98.7 (23 Aug 2023 20:36)  HR: 64 (24 Aug 2023 16:48) (64 - 76)  BP: 113/62 (24 Aug 2023 16:48) (108/57 - 145/65)  BP(mean): 81 (24 Aug 2023 16:48) (78 - 98)  RR: 18 (24 Aug 2023 16:48) (17 - 18)  SpO2: 99% (24 Aug 2023 16:48) (98% - 100%)    Parameters below as of 24 Aug 2023 16:48  Patient On (Oxygen Delivery Method): room air        Physical exam:  General: No acute distress, awake and alert  Eyes: Anicteric sclerae, moist conjunctivae, see below for CNs  Neck: trachea midline  Cardiovascular: Regular rate and rhythm on monitor  Pulmonary: No use of accessory muscles  GI: Abdomen soft, non-distended, non-tender  Extremities: no edema    Neurologic:  -Mental status: Awake, alert, oriented to person, place, and time. Speech is fluent with intact naming, some paraphrasic errors (word substitution), intact comprehension, no dysarthria. Recent and remote memory intact. Follows two and three step commands. Attention/concentration intact. Fund of knowledge appropriate (unable to name who the  is).  -Cranial nerves:   II: Visual fields are full to confrontation.  III, IV, VI: Extraocular movements are intact without nystagmus. Pupils equally round and reactive to light  V:  Facial sensation V1-V3 equal and intact   VII: Face is symmetric with normal smile  Motor: Normal bulk and tone. No pronator drift. Strength bilateral upper extremity 5/5, bilateral lower extremities 5/5.  Rapid alternating movements intact and symmetric  Sensation: Intact to light touch bilaterally. No neglect or extinction on double simultaneous testing.  Coordination: No dysmetria on finger-to-nose  bilaterally   Gait: Narrow gait and steady    LABS:                        9.3    7.85  )-----------( 278      ( 24 Aug 2023 06:58 )             27.7     08-24    128<L>  |  96  |  22  ----------------------------<  112<H>  3.9   |  24  |  0.77    Ca    8.8      24 Aug 2023 06:58  Phos  3.4     08-24  Mg     1.8     08-24    TPro  6.3  /  Alb  4.0  /  TBili  0.3  /  DBili  x   /  AST  14  /  ALT  13  /  AlkPhos  113  08-24      Urinalysis Basic - ( 24 Aug 2023 06:58 )    Color: x / Appearance: x / SG: x / pH: x  Gluc: 112 mg/dL / Ketone: x  / Bili: x / Urobili: x   Blood: x / Protein: x / Nitrite: x   Leuk Esterase: x / RBC: x / WBC x   Sq Epi: x / Non Sq Epi: x / Bacteria: x        RADIOLOGY & ADDITIONAL TESTS:    MR Head No Cont (08.23.23 @ 23:06) >  IMPRESSION: No acute intracranial process. Cortical involutional and deep   white matter small vessel changes.           Neurology Stroke Progress Note    INTERVAL HPI/OVERNIGHT EVENTS:  Patient seen and examined at beside. No acute events overnight. COY unable to be done due to obstruction during procedure. Na: 128, urine lytes sent, repeat BMP. EEG negative, removed. ILR to be placed tomorrow and then can be discharged home.     MEDICATIONS  (STANDING):  aspirin enteric coated 81 milliGRAM(s) Oral daily  buPROPion XL (24-Hour) . 300 milliGRAM(s) Oral daily  clopidogrel Tablet 75 milliGRAM(s) Oral daily  enoxaparin Injectable 40 milliGRAM(s) SubCutaneous every 24 hours  FLUoxetine 80 milliGRAM(s) Oral daily  lamoTRIgine 300 milliGRAM(s) Oral daily  lamoTRIgine 200 milliGRAM(s) Oral at bedtime  levothyroxine 100 MICROGram(s) Oral daily  morphine ER Tablet 15 milliGRAM(s) Oral daily  PHENobarbital 129.6 milliGRAM(s) Oral two times a day  traZODone 50 milliGRAM(s) Oral at bedtime    MEDICATIONS  (PRN):    Allergies    penicillin (Hives)    Intolerances        Vital Signs Last 24 Hrs  T(C): 36.8 (24 Aug 2023 18:03), Max: 37.1 (23 Aug 2023 20:36)  T(F): 98.2 (24 Aug 2023 18:03), Max: 98.7 (23 Aug 2023 20:36)  HR: 64 (24 Aug 2023 16:48) (64 - 76)  BP: 113/62 (24 Aug 2023 16:48) (108/57 - 145/65)  BP(mean): 81 (24 Aug 2023 16:48) (78 - 98)  RR: 18 (24 Aug 2023 16:48) (17 - 18)  SpO2: 99% (24 Aug 2023 16:48) (98% - 100%)    Parameters below as of 24 Aug 2023 16:48  Patient On (Oxygen Delivery Method): room air        Physical exam:  General: No acute distress, awake and alert  Eyes: Anicteric sclerae, moist conjunctivae, see below for CNs  Neck: trachea midline  Cardiovascular: Regular rate and rhythm on monitor  Pulmonary: No use of accessory muscles  GI: Abdomen soft, non-distended, non-tender  Extremities: no edema    Neurologic:  -Mental status: Awake, alert, oriented to person, place, and time. Speech is fluent with intact naming, some paraphrasic errors (word substitution), intact comprehension, no dysarthria. Recent and remote memory intact. Follows two and three step commands. Attention/concentration intact. Fund of knowledge appropriate (unable to name who the  is).  -Cranial nerves:   II: Visual fields are full to confrontation.  III, IV, VI: Extraocular movements are intact without nystagmus. Pupils equally round and reactive to light  V:  Facial sensation V1-V3 equal and intact   VII: Face is symmetric with normal smile  Motor: Normal bulk and tone. No pronator drift. Strength bilateral upper extremity 5/5, bilateral lower extremities 5/5.  Rapid alternating movements intact and symmetric  Sensation: Intact to light touch bilaterally. No neglect or extinction on double simultaneous testing.  Coordination: No dysmetria on finger-to-nose  bilaterally   Gait: Narrow gait and steady    LABS:                        9.3    7.85  )-----------( 278      ( 24 Aug 2023 06:58 )             27.7     08-24    128<L>  |  96  |  22  ----------------------------<  112<H>  3.9   |  24  |  0.77    Ca    8.8      24 Aug 2023 06:58  Phos  3.4     08-24  Mg     1.8     08-24    TPro  6.3  /  Alb  4.0  /  TBili  0.3  /  DBili  x   /  AST  14  /  ALT  13  /  AlkPhos  113  08-24      Urinalysis Basic - ( 24 Aug 2023 06:58 )    Color: x / Appearance: x / SG: x / pH: x  Gluc: 112 mg/dL / Ketone: x  / Bili: x / Urobili: x   Blood: x / Protein: x / Nitrite: x   Leuk Esterase: x / RBC: x / WBC x   Sq Epi: x / Non Sq Epi: x / Bacteria: x        RADIOLOGY & ADDITIONAL TESTS:    MR Head No Cont (08.23.23 @ 23:06) >  IMPRESSION: No acute intracranial process. Cortical involutional and deep   white matter small vessel changes.

## 2023-08-24 NOTE — DISCHARGE NOTE PROVIDER - CARE PROVIDER_API CALL
Eva Dick NP in Family Health  [address]  [place] [address]  Phone: [phone]  Fax: [phone]  Follow Up Time:    Eva Dick  NP in Family Health  [address]  [place] [address]  Phone: [phone]  Fax: [phone]  Follow Up Time:     Electrophysiology,   Electrophysiology Clinic - Device Check  [address]  [place] [address]  Phone: (   )    -  Fax: (   )    -  Scheduled Appointment: 09/14/2023 10:00 AM

## 2023-08-24 NOTE — DISCHARGE NOTE PROVIDER - NSDCQMSTROKERISK_NEU_ALL_CORE
High blood pressure/High cholesterol High blood pressure/High cholesterol/History of a stroke or TIA

## 2023-08-24 NOTE — CONSULT NOTE ADULT - ASSESSMENT
67M with PMH of epilepsy, HTN, HLD, opioid dependence after pelvic fracture presenting with episode of disorientation and word-finding difficulties.  S/p TNK and stay in the MICU for monitoring, now stepped down to stroke telemetry service for further management.     #Word finding difficulties  #Disorientation  #CVA workup, s/p TNK  #Epilepsy  - blood pressure control and management per stroke team  - continue on telemetry monitor  - COY unsuccessful as could not advance probe.  Likely will need cardiac CT.  - continue on DAPT  - continue on lamictal and phenobarbital -?epilepsy consult    #HTN  #HLD  - blood pressure management per stroke team, particularly in setting of recent TNK  - not on statin at this time    #Opioid dependence  - on MS contin at home  - will need to continue work with outpatient providers about further weaning fro mnarcotics    #Hyponatremia  - appears to be chronic in nature, seeing that all labs in [facility] seem to show Na in low 130s.  128 this morning, but may be in setting of being NPO for testing.  Patient reports that he is aware of the hyponatremia, but no provider has ever addressed it further.    - recommend repeating BMP, serum osms, urine osms and urine electrolytes in the evening to further determine etiology  - check TSH as well     #Hypothyroidism  - continue home dose of levothyroxine    #Anhedonia  - continue wellbutrin and fluoxetine    CBC unremarkable.     High level of medical decision making required for this case, including presence of TNK aborted stroke, review of outpatient documents, review of labs and including recommendation of hyponatremia workup, and discussion of case with the stroke team.  67M with PMH of epilepsy, HTN, HLD, opioid dependence after pelvic fracture presenting with episode of disorientation and word-finding difficulties.  S/p TNK and stay in the MICU for monitoring, now stepped down to stroke telemetry service for further management.     #Word finding difficulties  #Disorientation  #CVA workup, s/p TNK  #Epilepsy  - blood pressure control and management per stroke team  - continue on telemetry monitor  - COY unsuccessful as could not advance probe.  Likely will need cardiac CT.  - continue on DAPT  - continue on lamictal and phenobarbital -?epilepsy consult    #HTN  #HLD  - blood pressure management per stroke team, particularly in setting of recent TNK  - not on statin at this time    #Opioid dependence  - on MS contin at home  - will need to continue work with outpatient providers about further weaning fro mnarcotics    #Hyponatremia  - appears to be chronic in nature, seeing that all labs in [facility] seem to show Na in low 130s.  128 this morning, but may be in setting of being NPO for testing.  Patient reports that he is aware of the hyponatremia, but no provider has ever addressed it further.    - recommend repeating BMP, serum osms, urine osms and urine electrolytes in the evening to further determine etiology  - check TSH as well     #Hypothyroidism  - continue home dose of levothyroxine    #Anhedonia  - continue wellbutrin and fluoxetine    #Prostate cancer  - remote, s/p prostatectomy  - outpatient follow up    CBC unremarkable.     High level of medical decision making required for this case, including presence of TNK aborted stroke, review of outpatient documents, review of labs and including recommendation of hyponatremia workup, and discussion of case with the stroke team.  67M with PMH of epilepsy, HTN, HLD, opioid dependence after pelvic fracture presenting with episode of disorientation and word-finding difficulties.  S/p TNK and stay in the MICU for monitoring, now stepped down to stroke telemetry service for further management.     #Word finding difficulties  #Disorientation  #CVA workup, s/p TNK  #Epilepsy  - blood pressure control and management per stroke team  - continue on telemetry monitor  - COY unsuccessful as could not advance probe.  Likely will need cardiac CT.  - continue on DAPT  - continue on lamictal and phenobarbital -?epilepsy consult    #HTN  #HLD  - blood pressure management per stroke team, particularly in setting of recent TNK  - not on statin at this time    #Opioid dependence  - on MS contin at home  - will need to continue work with outpatient providers about further weaning fro mnarcotics    #Hyponatremia  - appears to be chronic in nature, seeing that all labs in [facility] seem to show Na in low 130s.  128 this morning, but may be in setting of being NPO for testing.  Patient reports that he is aware of the hyponatremia, but no provider has ever addressed it further.    - recommend repeating BMP, serum osms, urine osms and urine electrolytes in the evening to further determine etiology  - check TSH as well     #Hypothyroidism  - continue home dose of levothyroxine    #Anhedonia  - continue wellbutrin and fluoxetine    #Prostate cancer  - remote, s/p prostatectomy  - outpatient follow up    #Anemia  - 11.3 on admission, dropped to 9.3, with no evidence of bleeding  - repeat tomorrow.  If stable, can follow up outpatient.      CBC unremarkable.     High level of medical decision making required for this case, including presence of TNK aborted stroke, review of outpatient documents, review of labs and including recommendation of hyponatremia workup, and discussion of case with the stroke team.

## 2023-08-24 NOTE — DISCHARGE NOTE PROVIDER - PROVIDER TOKENS
PROVIDER:[TOKEN:[[id number]]] PROVIDER:[TOKEN:[[id number]]],FREE:[LAST:[Electrophysiology],PHONE:[(   )    -],FAX:[(   )    -],ADDRESS:[Electrophysiology [facility] - Device Check  [address] [place] [address]],SCHEDULEDAPPT:[09/14/2023],SCHEDULEDAPPTTIME:[10:00 AM]]

## 2023-08-24 NOTE — DISCHARGE NOTE PROVIDER - NSDCCPGOAL_GEN_ALL_CORE_FT
Physical Therapy     Referred by: Lizandro Ann MD; Medical Diagnosis (from order):    Diagnosis Information      Diagnosis    728.71 (ICD-9-CM) - M72.2 (ICD-10-CM) - Plantar fasciitis, bilateral                Daily Treatment Note    Visit:  4     SUBJECTIVE                                                                                                               Felt good for a couple of days with increased mobility after last visit and then the hip tightened back up.   Functional Change: Sore today with the low pressure system over the region. generally sore everywhere. Does feel she has a bit more mobility than she did prior to last visit.     OBJECTIVE                                                                                                                     Observation:   Comments / Details: Standing hip width weight shift to right patient's left side pelvis rides up with the hip limited for abduction      Joint Play:   Hip:     - Inferior Glide (in 90/90): Left: hypomobile and pain   Comments/Details: Combination of anterior inferior medial glide most limited    TREATMENT                                                                                                                  Manual Therapy:  With patient in supine worked on clearing the lateral and posterior aspect of the greater trochanter bursa  Inferior glides.   Moved to right sidelying and continued with lateral mobility work added in inferior and medial glide with anterior pressure with belt and with hands. Started in ~45 ° hip flexion and moved to 5 ° hip flexion with increased limitations noted.     Neuromuscular Re-Education:  Worked on drawing femoral head inferior with assisted motion / mobilizations   Repeated the weight shifting practice at end of session with improved mobility again.     Skilled input: verbal instruction/cues and tactile instruction/cues    Writer verbally educated and received verbal consent for hand  placement, positioning of patient, and techniques to be performed today from patient for hand placement and palpation for techniques as described above and how they are pertinent to the patient's plan of care.    Home Exercise Program/Education Materials: Weight shifting with drawing inferior      ASSESSMENT                                                                                                             Patient is consistently working on the hip motion and is dedicated to her home program      PLAN                                                                                                                           Suggestions for next session as indicated: Continue with work on anterior/ inferior/ medial hip mobility to allow increased hip abduction and External rotation          Therapy procedure time and total treatment time can be found documented on the Time Entry flowsheet   To get better and follow your care plan as instructed.

## 2023-08-24 NOTE — DISCHARGE NOTE PROVIDER - NSDCFUSCHEDAPPT_GEN_ALL_CORE_FT
[facility] Physician [facility]  [address]  Scheduled Appointment: 09/14/2023

## 2023-08-24 NOTE — EEG REPORT - NS EEG TEXT BOX
[facility] Department of Neurology  Inpatient Continuous video-Electroencephalogram    Patient Name:	TAWANNA BURGER    :	1955  MRN:	[id number]    Study Start Date/Time:  2023, 12:20:28 AM  Study End Date/Time:    Referred by Renetta Brock MD    Brief Clinical History:  TAWANNA BURGER is a 67 year old Male; study performed to investigate for seizures or markers of epilepsy.    Diagnosis Code:   R56.9 convulsions/seizure  The live video was: unmonitored.    Pertinent Medications:  /200, .6mg/daily    Acquisition Details:  Electroencephalography was acquired using a minimum of 21 channels on an [employer] Neurology system v 9.3.1 with electrode placement according to the standard International 10-20 system following ACNS (American Clinical Neurophysiology Society) guidelines for Long-Term Video EEG monitoring.  Anterior temporal T1 and T2 electrodes were utilized whenever possible.   The [employer] automated spike & seizure detections were all reviewed in detail, in addition to extensive portions of raw EEG.      Day 1: 2023 @ 12:20:28 AM to morning @ 07:00 am  Background:  continuous, with predominantly alpha/theta frequencies.  Symmetry:  No persistent asymmetries of voltage or frequency.  Posterior Dominant Rhythm:  7-8 Hz symmetric, well-organized, and poorly-modulated.  Organization: Rudimentary.  Voltage:  Normal (20+ uV)  Variability: Yes. 		Reactivity: Yes.  N2 sleep: Symmetric, synchronous spindles and K complexes.  Spontaneous Activity:  No epileptiform discharges.  Periodic/rhythmic activity:  None  Events:  No electrographic seizures or significant clinical events.  Provocations:  Hyperventilation and Photic stimulation: was not performed.    Daily Summary:    Continuous Mild generalized   slowing suggestive of a Mild degree of diffuse or multifocal  dysfunction.  No epileptiform activity and no significant clinical events occurred.      Mimi Luna MD  Attending Neurologist, [facility] Epilepsy Program     [facility] Department of Neurology  Inpatient Continuous video-Electroencephalogram    Patient Name:	TAWANNA BURGER    :	1955  MRN:	[id number]    Study Start Date/Time:  2023, 12:20:28 AM  Study End Date/Time:    Referred by Renetta Brock MD    Brief Clinical History:  TAWANNA BURGER is a 67 year old Male; study performed to investigate for seizures or markers of epilepsy.    Diagnosis Code:   R56.9 convulsions/seizure  The live video was: unmonitored.    Pertinent Medications:  /200, .6mg BID    Acquisition Details:  Electroencephalography was acquired using a minimum of 21 channels on an [employer] Neurology system v 9.3.1 with electrode placement according to the standard International 10-20 system following ACNS (American Clinical Neurophysiology Society) guidelines for Long-Term Video EEG monitoring.  Anterior temporal T1 and T2 electrodes were utilized whenever possible.   The XLTEK automated spike & seizure detections were all reviewed in detail, in addition to extensive portions of raw EEG.      Day 1: 2023 @ 12:20:28 AM to morning @ 07:00 am  Background:  continuous, with predominantly alpha/theta frequencies.  Symmetry:  No persistent asymmetries of voltage or frequency.  Posterior Dominant Rhythm:  7-8 Hz symmetric, well-organized, and poorly-modulated.  Organization: Rudimentary.  Voltage:  Normal (20+ uV)  Variability: Yes. 		Reactivity: Yes.  N2 sleep: Symmetric, synchronous spindles and K complexes.  Spontaneous Activity:  No epileptiform discharges.  Periodic/rhythmic activity:  None  Events:  No electrographic seizures or significant clinical events.  Provocations:  Hyperventilation and Photic stimulation: was not performed.    Daily Summary:    Continuous Mild generalized   slowing suggestive of a Mild degree of diffuse or multifocal  dysfunction.  No epileptiform activity and no significant clinical events occurred.      Mimi Luna MD  Attending Neurologist, [facility] Epilepsy Program     [facility] Department of Neurology  Inpatient Continuous video-Electroencephalogram    Patient Name:	TAWANNA BURGER    :	1955  MRN:	[id number]    Study Start Date/Time:  2023, 12:20:28 AM  Study End Date/Time:    Referred by Renetta Brock MD    Brief Clinical History:  TAWANNA BURGER is a 67 year old Male; study performed to investigate for seizures or markers of epilepsy.    Diagnosis Code:   R56.9 convulsions/seizure  The live video was: unmonitored.    Pertinent Medications:  /200, .6mg BID    Acquisition Details:  Electroencephalography was acquired using a minimum of 21 channels on an [employer] Neurology system v 9.3.1 with electrode placement according to the standard International 10-20 system following ACNS (American Clinical Neurophysiology Society) guidelines for Long-Term Video EEG monitoring.  Anterior temporal T1 and T2 electrodes were utilized whenever possible.   The XLTEK automated spike & seizure detections were all reviewed in detail, in addition to extensive portions of raw EEG.      Day 1: 2023 @ 12:20:28 AM to morning @ 07:00 am  Background:  continuous, with predominantly alpha/theta frequencies.  Symmetry:  No persistent asymmetries of voltage or frequency.  Posterior Dominant Rhythm:  7-8 Hz symmetric, well-organized, and poorly-modulated.  Organization: Rudimentary.  Voltage:  Normal (20+ uV)  Variability: Yes. 		Reactivity: Yes.  N2 sleep: Symmetric, synchronous spindles and K complexes.  Spontaneous Activity:  No epileptiform discharges.  Periodic/rhythmic activity:  None  Events:  No electrographic seizures or significant clinical events.  Provocations:  Hyperventilation and Photic stimulation: was not performed.    Daily Summary:    Continuous Mild generalized   slowing suggestive of a Mild degree of diffuse or multifocal  dysfunction.  No epileptiform activity and no significant clinical events occurred.      Mimi Luna MD  Attending Neurologist, [facility] Epilepsy Program

## 2023-08-24 NOTE — DISCHARGE NOTE PROVIDER - HOSPITAL COURSE
67y Male with PMH  epilepsy (on PHB with last seizures 40 years ago, semiology: grand mal), HTN, HLD. opiod dependence after a pelvic fracture in March 2023, left shoulder abscess, who presented to [facility] with sudden onset of word-finding difficulties and disorientation. On initial evaluation NIHSS was a 4. Aphasia seemed to be primarily expressive with some component of receptive. There was no weakness or other deficits. CTH was negative for any acute intracranial pathology. CTA without any large vessel occlusion or high grade stenosis. CTP with Elevated Tmax reported and both posterior cerebral hemispheres and additionally in the left anterior temporal and frontal lobes, unclear if significant. S/p tenecteplase 8/22/2023 and patient transferred to [facility] for post tenecteplase monitoring. MRI with no infarct; likely TNK aborted stroke as presenting symptoms concerning for ischemic event. TTE with no significant findings. COY unable to be performed due to resistance when attempting to pass probe. EEG was negative for seizures. S/p ILR ****. Plan for aspirin and plavix x 21 days followed by aspirin monotherapy for secondary stroke prevention.       During this hospital course, patient had no infarct on MRI; likely tenecteplase aborted stroke.      Patient had the following workup done in house:  CT Head: No acute intracranial hemorrhage or demarcated transcortical infarction.  MR Head Non Contrast: No acute intracranial process. Cortical involutional and deep   white matter small vessel changes.  CT Angio Head: No intracranial arterial steno-occlusive disease.  CT Angio Neck: No cervical arterial steno-occlusive disease.  Echo:   1. Normal left ventricular size and systolic function.   2. Normal right ventricular size and systolic function.   3. Normal atria.   4. Injection of agitated saline via a peripheral vein reveals no   evidence of a right-to-left shunt.   5. Mild aortic regurgitation.   6. Aortic sclerosis without significant stenosis.   7. Systolic anterior motion of the mitral valve is seen without evidence   of LVOT obstruction.   8. No evidence of pulmonary hypertension.   9. No pericardial effusion.  10. The proximal ascending aorta measures 3.50 cm (normal 2.6-3.4 cm for   men, 2.3-3.1 cm for women).    Labs: A1c 5.1, LDL 97  other    Physical exam at discharge:  NIHSS at discharge:     New medications on discharge:  Labs to be followed up:  Imaging to be done as outpatient:  Further outpatient workup:   67y Male with PMH  epilepsy (on PHB with last seizures 40 years ago, semiology: grand mal), HTN, HLD. opiod dependence after a pelvic fracture in March 2023, left shoulder abscess, who presented to [facility] with sudden onset of word-finding difficulties and disorientation. On initial evaluation NIHSS was a 4. Aphasia seemed to be primarily expressive with some component of receptive. There was no weakness or other deficits. CTH was negative for any acute intracranial pathology. CTA without any large vessel occlusion or high grade stenosis. CTP with Elevated Tmax reported and both posterior cerebral hemispheres and additionally in the left anterior temporal and frontal lobes, unclear if significant. S/p tenecteplase 8/22/2023 and patient transferred to [facility] for post tenecteplase monitoring. MRI with no infarct; likely TNK aborted stroke as presenting symptoms concerning for ischemic event. TTE with no significant findings. COY unable to be performed due to resistance when attempting to pass probe. EEG was negative for seizures. S/p ILR placement on 8/25. Plan for aspirin and plavix x 21 days followed by aspirin monotherapy for secondary stroke prevention.       During this hospital course, patient had no infarct on MRI; likely tenecteplase aborted stroke.      Patient had the following workup done in house:  CT Head: No acute intracranial hemorrhage or demarcated transcortical infarction.  MR Head Non Contrast: No acute intracranial process. Cortical involutional and deep   white matter small vessel changes.  CT Angio Head: No intracranial arterial steno-occlusive disease.  CT Angio Neck: No cervical arterial steno-occlusive disease.  Echo:   1. Normal left ventricular size and systolic function.   2. Normal right ventricular size and systolic function.   3. Normal atria.   4. Injection of agitated saline via a peripheral vein reveals no   evidence of a right-to-left shunt.   5. Mild aortic regurgitation.   6. Aortic sclerosis without significant stenosis.   7. Systolic anterior motion of the mitral valve is seen without evidence   of LVOT obstruction.   8. No evidence of pulmonary hypertension.   9. No pericardial effusion.  10. The proximal ascending aorta measures 3.50 cm (normal 2.6-3.4 cm for   men, 2.3-3.1 cm for women).    Labs: A1c 5.1, LDL 97    Physical exam at discharge:  General: No acute distress, awake and alert  Eyes: Anicteric sclerae, moist conjunctivae, see below for CNs  Neck: trachea midline  Cardiovascular: Regular rate and rhythm on monitor  Pulmonary: No use of accessory muscles  GI: Abdomen soft, non-distended, non-tender  Extremities: no edema    Neurologic:  -Mental status: Awake, alert, oriented to person, place, and time. Speech is fluent with intact naming, intact comprehension, no dysarthria. Recent and remote memory intact. Follows two and three step commands. Attention/concentration intact. Fund of knowledge appropriate.  -Cranial nerves:   II: Visual fields are full to confrontation.  III, IV, VI: Extraocular movements are intact without nystagmus. Pupils equally round and reactive to light  V:  Facial sensation V1-V3 equal and intact   VII: Face is symmetric with normal smile  Motor: Normal bulk and tone. No pronator drift. Strength bilateral upper extremity 5/5, bilateral lower extremities 5/5.  Rapid alternating movements intact and symmetric  Sensation: Intact to light touch bilaterally. No neglect or extinction on double simultaneous testing.  Coordination: No dysmetria on finger-to-nose  bilaterally   Gait: Narrow gait and steady  NIHSS at discharge: 0    New medications on discharge: Valsartan 40mg   Labs to be followed up: BMP for Na  Imaging to be done as outpatient: f/u with PCP for HTN, f/u EP, f/u neurology, f/u   Further outpatient workup: as above

## 2023-08-24 NOTE — PROGRESS NOTE ADULT - NS ATTEND AMEND GEN_ALL_CORE FT
67 year old man w/ PMH of Epilepsy (on PB and LTG, last seizure 40 years ago, semiology GTC), HTN, HLD, opiod dependence after pelvic fracture 3/2023, L. shoulder abscess presented w/ word-finding difficulties s/p TNK. Patient state he had an episode of "unable to get words out" as reported by wife. States he does not recall this. No similar episode in the past. Last seizure 40 years ago. HIs neurolgist wanted to taper off PB and LTG but patient preferred to stay on.     CTH negative  CTA h/n negative  CTP negative  A1c 5.1  LDL 97  PB level 47.1 (slightly supratherapeutic range)   WBC 12.91  MR brain negative  EEG mild slowing  TTE negative; Aborted COY given difficultly with procedure.     Impression: Transient ?aphasia now resolved, likely localizing to the L. hemisphere. Etiology uncertain, possibly TNK aborted stroke, seizure, or underlying undiagnosed neurodegenerative process (i.e primary progressive aphasia).     Plan:   S/p TNK; After 24 hours, ASA 81mg + Plavix 75mg for three weeks followed by ASA 81mg (extrapolating from [facility])  ILR   Gradual normotension   No need to change AEDs as patient prefers to remain on both agents    PT - pending     50 minutes spent on total encounter. The necessity of the time spent during the encounter on this date of service was due to:     Review of imaging and chart; obtaining history; examination of pt; discussion and coordination of care, and discussion of lifestyle modification and risk factor control.

## 2023-08-25 DIAGNOSIS — I63.9 CEREBRAL INFARCTION, UNSPECIFIED: ICD-10-CM

## 2023-08-25 LAB
FERRITIN SERPL-MCNC: 145 NG/ML — SIGNIFICANT CHANGE UP (ref 30–400)
HAPTOGLOB SERPL-MCNC: 130 MG/DL — SIGNIFICANT CHANGE UP (ref 34–200)
HCT VFR BLD CALC: 24.9 % — LOW (ref 39–50)
HCT VFR BLD CALC: 25.7 % — LOW (ref 39–50)
HGB BLD-MCNC: 8.6 G/DL — LOW (ref 13–17)
HGB BLD-MCNC: 8.6 G/DL — LOW (ref 13–17)
IRON SATN MFR SERPL: 30 % — SIGNIFICANT CHANGE UP (ref 16–55)
IRON SATN MFR SERPL: 52 UG/DL — SIGNIFICANT CHANGE UP (ref 45–165)
LDH SERPL L TO P-CCNC: 130 U/L — SIGNIFICANT CHANGE UP (ref 50–242)
MCHC RBC-ENTMCNC: 29.4 PG — SIGNIFICANT CHANGE UP (ref 27–34)
MCHC RBC-ENTMCNC: 30 PG — SIGNIFICANT CHANGE UP (ref 27–34)
MCHC RBC-ENTMCNC: 33.5 GM/DL — SIGNIFICANT CHANGE UP (ref 32–36)
MCHC RBC-ENTMCNC: 34.5 GM/DL — SIGNIFICANT CHANGE UP (ref 32–36)
MCV RBC AUTO: 86.8 FL — SIGNIFICANT CHANGE UP (ref 80–100)
MCV RBC AUTO: 87.7 FL — SIGNIFICANT CHANGE UP (ref 80–100)
NRBC # BLD: 0 /100 WBCS — SIGNIFICANT CHANGE UP (ref 0–0)
NRBC # BLD: 0 /100 WBCS — SIGNIFICANT CHANGE UP (ref 0–0)
PLATELET # BLD AUTO: 237 K/UL — SIGNIFICANT CHANGE UP (ref 150–400)
PLATELET # BLD AUTO: 271 K/UL — SIGNIFICANT CHANGE UP (ref 150–400)
RBC # BLD: 2.87 M/UL — LOW (ref 4.2–5.8)
RBC # BLD: 2.87 M/UL — LOW (ref 4.2–5.8)
RBC # BLD: 2.93 M/UL — LOW (ref 4.2–5.8)
RBC # FLD: 13.3 % — SIGNIFICANT CHANGE UP (ref 10.3–14.5)
RBC # FLD: 13.4 % — SIGNIFICANT CHANGE UP (ref 10.3–14.5)
RETICS #: 60.3 K/UL — SIGNIFICANT CHANGE UP (ref 25–125)
RETICS/RBC NFR: 2.1 % — SIGNIFICANT CHANGE UP (ref 0.5–2.5)
TIBC SERPL-MCNC: 174 UG/DL — LOW (ref 220–430)
UIBC SERPL-MCNC: 122 UG/DL — SIGNIFICANT CHANGE UP (ref 110–370)
WBC # BLD: 7.3 K/UL — SIGNIFICANT CHANGE UP (ref 3.8–10.5)
WBC # BLD: 7.36 K/UL — SIGNIFICANT CHANGE UP (ref 3.8–10.5)
WBC # FLD AUTO: 7.3 K/UL — SIGNIFICANT CHANGE UP (ref 3.8–10.5)
WBC # FLD AUTO: 7.36 K/UL — SIGNIFICANT CHANGE UP (ref 3.8–10.5)

## 2023-08-25 PROCEDURE — 99232 SBSQ HOSP IP/OBS MODERATE 35: CPT | Mod: 95

## 2023-08-25 PROCEDURE — 99233 SBSQ HOSP IP/OBS HIGH 50: CPT

## 2023-08-25 PROCEDURE — 33285 INSJ SUBQ CAR RHYTHM MNTR: CPT

## 2023-08-25 PROCEDURE — ZZZZZ: CPT

## 2023-08-25 RX ORDER — VALSARTAN 80 MG/1
1 TABLET ORAL
Refills: 0 | DISCHARGE

## 2023-08-25 RX ORDER — VALSARTAN 80 MG/1
40 TABLET ORAL DAILY
Refills: 0 | Status: DISCONTINUED | OUTPATIENT
Start: 2023-08-25 | End: 2023-08-26

## 2023-08-25 RX ADMIN — Medication 80 MILLIGRAM(S): at 11:53

## 2023-08-25 RX ADMIN — MORPHINE SULFATE 15 MILLIGRAM(S): 50 CAPSULE, EXTENDED RELEASE ORAL at 12:53

## 2023-08-25 RX ADMIN — Medication 50 MILLIGRAM(S): at 21:09

## 2023-08-25 RX ADMIN — ATORVASTATIN CALCIUM 40 MILLIGRAM(S): 80 TABLET, FILM COATED ORAL at 21:10

## 2023-08-25 RX ADMIN — Medication 100 MICROGRAM(S): at 06:50

## 2023-08-25 RX ADMIN — CLOPIDOGREL BISULFATE 75 MILLIGRAM(S): 75 TABLET, FILM COATED ORAL at 11:53

## 2023-08-25 RX ADMIN — VALSARTAN 40 MILLIGRAM(S): 80 TABLET ORAL at 11:53

## 2023-08-25 RX ADMIN — Medication 129.6 MILLIGRAM(S): at 06:50

## 2023-08-25 RX ADMIN — BUPROPION HYDROCHLORIDE 300 MILLIGRAM(S): 150 TABLET, EXTENDED RELEASE ORAL at 11:55

## 2023-08-25 RX ADMIN — LAMOTRIGINE 200 MILLIGRAM(S): 25 TABLET, ORALLY DISINTEGRATING ORAL at 21:09

## 2023-08-25 RX ADMIN — Medication 81 MILLIGRAM(S): at 11:53

## 2023-08-25 RX ADMIN — MORPHINE SULFATE 15 MILLIGRAM(S): 50 CAPSULE, EXTENDED RELEASE ORAL at 13:19

## 2023-08-25 RX ADMIN — LAMOTRIGINE 300 MILLIGRAM(S): 25 TABLET, ORALLY DISINTEGRATING ORAL at 11:54

## 2023-08-25 RX ADMIN — Medication 129.6 MILLIGRAM(S): at 21:19

## 2023-08-25 RX ADMIN — ENOXAPARIN SODIUM 40 MILLIGRAM(S): 100 INJECTION SUBCUTANEOUS at 21:19

## 2023-08-25 NOTE — PROGRESS NOTE ADULT - ASSESSMENT
67M with PMH of epilepsy, HTN, HLD, opioid dependence after pelvic fracture presenting with episode of disorientation and word-finding difficulties.  S/p TNK and stay in the MICU for monitoring, now stepped down to stroke telemetry service for further management.     #Word finding difficulties  #Disorientation  #CVA workup, s/p TNK  #Epilepsy  - blood pressure control and management per stroke team  - continue on telemetry monitor  - COY unsuccessful as could not advance probe.  Per discussion with stroke team, no indication for COY at this time or cardiac CT.   - continue on DAPT  - continue on lamictal and phenobarbital   - no seizure activity identified on EEG  - ILR placed today. Follow up with EP planned for September.    - anticipate discharge home today with outpatient follow up  - recommend PCP follow up in one week, discussed this with patient    #HTN  #HLD  - blood pressure management per stroke team, particularly in setting of recent TNK  - not on statin at this time    #Opioid dependence  - on MS contin at home  - will need to continue work with outpatient providers about further weaning fro mnarcotics    #Hyponatremia  - appears to be chronic in nature, seeing that all labs in [facility] seem to show Na in low 130s.  Repeat 130.  Outpatient follow up.  Per review of labs, likely related to hypovolemia.      #Hypothyroidism  - continue home dose of levothyroxine    #Anhedonia  - continue wellbutrin and fluoxetine    #Prostate cancer  - remote, s/p prostatectomy  - outpatient follow up    #Anemia  - 11.3 on admission, dropped to 9.3, with no evidence of bleeding  -  pending repeat today.  There is no tachycardic, hypotension or other concerning findings for     50 minutes spent on this encounter, including face to face with patient, care coordination and documentation.  Plan of care discussed with stroke team. 
  67y Male with PMHx of epilepsy (on PHB with last seizures 40 years ago, semiology: grand mal), HTN, HLD. opiod dependence after a pelvic fracture in March 2023, left shoulder abscess, who presented to [facility] with sudden onset of word-finding difficulties and disorientation. LKW 1400. Stroke code called at [facility] ED. NIHSS initially a 4, improved to a 2 prior to TNK push. CTH was negative for any acute intracranial pathology. CTA without any large vessel occlusion or high grade stenosis. CTP with Elevated Tmax reported and both posterior cerebral hemispheres and additionally in the left anterior temporal and frontal lobes, unclear if significant. Case discussed with Dr. Dick Aly through telestroke prior to urgent intervention. Risk and benefits of tenecteplase were discussed with patient/family member including risk of symptomatic ICH/death. Decision was made that benefits outweighed risks and tenecteplase was administered. Patient transferred to [facility] for further working and post TNK monitoring in the MICU.    NEURO  # post tenecteplase monitoring  -  dysphagia screen, after that start atorvastatin 80 once daily  - goal BP <180/105, notify provider if out of range  - During infusion, neuro exams every 15 minutes.   - Check every 15 minutes for first hour after tenecteplase; every 30 minutes for the next 6 hours; hourly until 24 hours from end of infusion.   - Minimize arterial and venous punctures when able,   - able to draw blood 4hr s/p alteplase  - Keep temp <100F and maintain glucose 140-180.   - Notify provider for "HR >100 or <55 or SaO2 <95%"  - Maintain strict bed rest for 12 hours with HOB <30 degrees  - After 12hr s/p tenecteplase, patient able to ambulate with assist    - Repeat CT head with any acute change in mental status.   - No antiplatelet, anticoagulation, and heparin sq until 24 hour CT head negative for bleed.     - Obtain Hemoglobin A1c, Lipid profile set, and TSH    # Other tests:  - Repeat CT head noncon 24 hours post-tenecteplase to rule out bleed  - MRI brain without contrast   - echo with bubble, may eventually need COY  - PT/OT order  - Stroke education  - Obtain Phenobarbital level    PPX  DVT ppx - SCDs (NO heparin SQ as post tenecteplase protocol)  
67y Male with PMHx of epilepsy (on PHB with last seizures 40 years ago, semiology: grand mal), HTN, HLD. opiod dependence after a pelvic fracture in March 2023, left shoulder abscess, who presented to [facility] with sudden onset of word-finding difficulties and disorientation. Stroke code called at [facility] ED. NIHSS initially a 4, improved to a 2 prior to TNK push. CTH was negative for any acute intracranial pathology. CTA without any large vessel occlusion or high grade stenosis. CTP with Elevated Tmax reported and both posterior cerebral hemispheres and additionally in the left anterior temporal and frontal lobes, unclear if significant. Decision was made to administer tenecteplase. Patient transferred to [facility] for further working and post TNK monitoring in the MICU. 24 hour CTH stable, patient transferred to stroke tele for further workup. Exam now non-focal, likely TNK aborted stroke but also consider seizure vs. toxic metabolic encephalopathy given initial waxing and waning language exam.     Neuro  #CVA workup  - continue aspirin 81mg and plavix 75mg daily  - continue atorvastatin 80mg daily  - q4hr stroke neuro checks and vitals  - obtain MRI Brain without contrast  - Stroke Code HCT Results: negative  - Stroke Code CTA Results: negative  - Stroke education    #Hx of Epilepsy  - continue home lamotrigine (takes 300mg in AM, 200mg in PM)  - continue home phenobarbital (129mg BID)  - Phenobarb level 47.1  - Routine EEG pending    Cards  #HTN  - permissive hypertension, Goal -180  - hold home blood pressure medication for now  - TTE with bubble: No PFO. Normal LV size and function.       #HLD  - high dose statin as above in CVA  - LDL results: 97    Pulm  - call provider if SPO2 < 94%    GI  #Nutrition/Fluids/Electrolytes   - replete K<4 and Mg <2  - Diet: Regular  - IVF: None for now    Renal  - Trend BMP    Infectious Disease  - Mild leukocytosis  - f/u blood cx in AM  - f/u routine CXR  - f/u UA w/ reflex culture     Endocrine  - A1C results: 5.1    - TSH results: 3.89    DVT Prophylaxis  - lovenox sq for DVT prophylaxis   - SCDs for DVT prophylaxis       IDR Goals: Goals reviewed at interdisciplinary rounds with case management, social work, physical therapy, occupational therapy, and speech language pathology.   Please see specific therapy  notes for in depth goals.  Dispo: Pending PT/OT/Speech Eval     Discussed daily hospital plans and goals with patient and family at bedside.     Discussed with Neurology Attending Dr. Renetta Brock and Dr. Dick Aly  
67y Male with PMHx of epilepsy (on PHB with last seizures 40 years ago, semiology: grand mal), HTN, HLD. opiod dependence after a pelvic fracture in March 2023, left shoulder abscess, who presented to [facility] with sudden onset of word-finding difficulties and disorientation. Stroke code called at [facility] ED. NIHSS initially a 4, improved to a 2 prior to TNK push. CTH was negative for any acute intracranial pathology. CTA without any large vessel occlusion or high grade stenosis. CTP with Elevated Tmax reported and both posterior cerebral hemispheres and additionally in the left anterior temporal and frontal lobes, unclear if significant. Decision was made to administer tenecteplase. Patient transferred to [facility] for further working and post TNK monitoring in the MICU. 24 hour CTH stable, patient transferred to stroke tele for further workup. Exam now non-focal, likely TNK aborted stroke but also consider seizure vs. toxic metabolic encephalopathy given initial waxing and waning language exam. Treating as TNK aborted stroke.    Neuro  #TNK-aborted stroke  - continue aspirin 81mg and plavix 75mg daily x 21 days  - continue atorvastatin 80mg daily  - q4hr stroke neuro checks and vitals  - MRI Brain: negative   - Stroke Code HCT Results: negative  - Stroke Code CTA Results: negative  - Stroke education    #Hx of Epilepsy  - continue home lamotrigine (takes 300mg in AM, 200mg in PM)  - continue home phenobarbital (129mg BID)  - Phenobarb level 47.1  - EEG: negative     Cards  #HTN  - permissive hypertension, Goal -180  - start lower dose Valsartan 40mg qd  - TTE with bubble: No PFO. Normal LV size and function.       #HLD  - high dose statin as above in CVA  - LDL results: 97    Pulm  - call provider if SPO2 < 94%    GI  #Nutrition/Fluids/Electrolytes   - replete K<4 and Mg <2  - Diet: Regular  - IVF: None for now    Renal  #hyponatremia  - Na: 133 and now Na: 130  - Urine lytes ordered, consistent with hypovolemic hyponatremia    Infectious Disease  - Leukocytosis resolved   - blood cx: negative  - f/u routine CXR  - UA: negative     Endocrine  - A1C results: 5.1  - TSH results: 3.89    Heme  #Anemia  - downtrending Hgb, 8.6 today  - repeat Hgb in the PM, will obtain iron studies and hemolysis workup  - if continues to downtrend, will start PPI BID and workup for GI bleed    DVT Prophylaxis  - lovenox sq for DVT prophylaxis   - SCDs for DVT prophylaxis       IDR Goals: Goals reviewed at interdisciplinary rounds with case management, social work, physical therapy, occupational therapy, and speech language pathology.   Please see specific therapy  notes for in depth goals.    Dispo: Home     Discussed daily hospital plans and goals with patient and family at bedside.     Discussed with Neurology Attending Dr. Renetta Brock.
67y Male with PMHx of epilepsy (on PHB with last seizures 40 years ago, semiology: grand mal), HTN, HLD. opiod dependence after a pelvic fracture in March 2023, left shoulder abscess, who presented to [facility] with sudden onset of word-finding difficulties and disorientation. Stroke code called at [facility] ED. NIHSS initially a 4, improved to a 2 prior to TNK push. CTH was negative for any acute intracranial pathology. CTA without any large vessel occlusion or high grade stenosis. CTP with Elevated Tmax reported and both posterior cerebral hemispheres and additionally in the left anterior temporal and frontal lobes, unclear if significant. Decision was made to administer tenecteplase. Patient transferred to [facility] for further working and post TNK monitoring in the MICU. 24 hour CTH stable, patient transferred to stroke tele for further workup. Exam now non-focal, likely TNK aborted stroke but also consider seizure vs. toxic metabolic encephalopathy given initial waxing and waning language exam. Treating as TNK aborted stroke.    Neuro  #TNK-aborted stroke  - continue aspirin 81mg and plavix 75mg daily x 21 days  - continue atorvastatin 80mg daily  - q4hr stroke neuro checks and vitals  - MRI Brain: negative   - Stroke Code HCT Results: negative  - Stroke Code CTA Results: negative  - Stroke education    #Hx of Epilepsy  - continue home lamotrigine (takes 300mg in AM, 200mg in PM)  - continue home phenobarbital (129mg BID)  - Phenobarb level 47.1  - EEG: negative     Cards  #HTN  - permissive hypertension, Goal -180  - hold home blood pressure medication for now  - TTE with bubble: No PFO. Normal LV size and function.       #HLD  - high dose statin as above in CVA  - LDL results: 97    Pulm  - call provider if SPO2 < 94%    GI  #Nutrition/Fluids/Electrolytes   - replete K<4 and Mg <2  - Diet: Regular  - IVF: None for now    Renal  #hyponatremia  - Na: 133 and now Na: 128  - Urine lytes ordered, repeat BMP    Infectious Disease  - Leukocytosis resolved   - blood cx: negative  - f/u routine CXR  - UA: negative     Endocrine  - A1C results: 5.1    - TSH results: 3.89    DVT Prophylaxis  - lovenox sq for DVT prophylaxis   - SCDs for DVT prophylaxis       IDR Goals: Goals reviewed at interdisciplinary rounds with case management, social work, physical therapy, occupational therapy, and speech language pathology.   Please see specific therapy  notes for in depth goals.    Dispo: Home     Discussed daily hospital plans and goals with patient and family at bedside.     Discussed with Neurology Attending Dr. Renetta Brock and Dr. Dick Aly  
Neurology    67 y o Male with PMHx of epilepsy (on PHB with last seizures 40 years ago, semiology: grand mal), HTN, HLD. opiod dependence after a pelvic fracture in March 2023, left shoulder abscess, who presented to [facility] with sudden onset of word-finding difficulties and disorientation. Stroke code called at [facility] ED. NIHSS initially a 4, improved to a 2 prior to TNK push. CTH was negative for any acute intracranial pathology. CTA without any large vessel occlusion or high grade stenosis. CTP with Elevated Tmax reported and both posterior cerebral hemispheres and additionally in the left anterior temporal and frontal lobes, unclear if significant. Decision was made to administer tenecteplase. Patient transferred to [facility] for further working and post TNK monitoring in the MICU. 24 hour CTH stable, patient transferred to stroke tele for further workup. Exam now non-focal, likely TNK aborted stroke but also consider seizure vs. toxic metabolic encephalopathy given initial waxing and waning language exam. Treating as TNK aborted stroke.    Neuro  #TNK-aborted stroke  - continue aspirin 81mg and plavix 75mg daily x 21 days  - continue atorvastatin 80mg daily  - q4hr stroke neuro checks and vitals  - MRI Brain: negative   - Stroke Code HCT Results: negative  - Stroke Code CTA Results: negative  - Stroke education    #Hx of Epilepsy  - continue home lamotrigine (takes 300mg in AM, 200mg in PM)  - continue home phenobarbital (129mg BID)  - Phenobarb level 47.1  - EEG: negative     Cards  #HTN  - permissive hypertension, Goal -180  - hold home blood pressure medication for now  - TTE with bubble: No PFO. Normal LV size and function.       #HLD  - high dose statin as above in CVA  - LDL results: 97    Pulm  - call provider if SPO2 < 94%    GI  #Nutrition/Fluids/Electrolytes   - replete K<4 and Mg <2  - Diet: Regular  - IVF: None for now    Renal  #hyponatremia  - Na: 133 and now Na: 128  - Urine lytes ordered, repeat BMP    Infectious Disease  - Leukocytosis resolved   - blood cx: negative  - f/u routine CXR  - UA: negative     Endocrine  - A1C results: 5.1    - TSH results: 3.89    DVT Prophylaxis  - lovenox sq for DVT prophylaxis   - SCDs for DVT prophylaxis       IDR Goals: Goals reviewed at interdisciplinary rounds with case management, social work, physical therapy, occupational therapy, and speech language pathology.   Please see specific therapy  notes for in depth goals.    Dispo: Home

## 2023-08-25 NOTE — SPEECH LANGUAGE PATHOLOGY EVALUATION - SLP DIAGNOSIS
Pt presented with functional expressive and receptive language and speech in setting of likely TNK aborted stroke. Education provided re continuing to monitor for any changes to speech and language over the next few weeks.

## 2023-08-25 NOTE — CONSULT NOTE ADULT - PROBLEM SELECTOR RECOMMENDATION 9
The association between AF and stroke was discussed in detail. The ILR procedure and associated risks was also discussed. All questions answered. He is amenable to the procedure and informed consent signed.     Please have the patient follow-up in wound check/device check clinic on 9/14 at 10am (EP office in [place]).

## 2023-08-25 NOTE — PROGRESS NOTE ADULT - NS ATTEND AMEND GEN_ALL_CORE FT
67 year old man w/ PMH of Epilepsy (on PB and LTG, last seizure 40 years ago, semiology GTC), HTN, HLD, opiod dependence after pelvic fracture 3/2023, L. shoulder abscess presented w/ word-finding difficulties s/p TNK. Patient state he had an episode of "unable to get words out" as reported by wife. States he does not recall this. No similar episode in the past. Last seizure 40 years ago. HIs neurolgist wanted to taper off PB and LTG but patient preferred to stay on.     CTH negative  CTA h/n negative  CTP negative  A1c 5.1  LDL 97  PB level 47.1 (slightly supratherapeutic range)   WBC 12.91  MR brain negative  EEG mild slowing  TTE negative; Aborted COY given difficultly with procedure.     Impression: Transient ?aphasia now resolved, likely localizing to the L. hemisphere. Etiology uncertain, possibly TNK aborted stroke, seizure, or underlying undiagnosed neurodegenerative process (i.e primary progressive aphasia).     Plan:   S/p TNK; After 24 hours, ASA 81mg + Plavix 75mg for three weeks followed by ASA 81mg (extrapolating from [facility])  s/p ILR   Drop in Hgb since admission. Will repeat CBC 8/25 PM. Holding discharge untill then.   No need to change AEDs as patient prefers to remain on both agents    PT - Home    35 minutes spent on total encounter. The necessity of the time spent during the encounter on this date of service was due to:     Review of imaging and chart; obtaining history; examination of pt; discussion and coordination of care, and discussion of lifestyle modification and risk factor control.

## 2023-08-25 NOTE — CONSULT NOTE ADULT - ASSESSMENT
67y Male with PMHx of epilepsy (on PHB with last seizures 40 years ago, semiology: grand mal), HTN, HLD. opioid dependence after a pelvic fracture in March 2023, left shoulder abscess, who presented to [facility] with sudden onset of word-finding difficulties and disorientation. Stroke code called at [facility] ED. NIHSS initially a 4, improved to a 2 prior to TNK push. CTH was negative for any acute intracranial pathology. CTA without any large vessel occlusion or high grade stenosis. CTP with Elevated Tmax reported and both posterior cerebral hemispheres and additionally in the left anterior temporal and frontal lobes, unclear if significant. Decision was made to administer tenecteplase. Patient transferred to [facility] for further working and post TNK monitoring in the MICU. 24 hour CTH stable, patient transferred to stroke tele for further workup. Exam now non-focal, likely TNK aborted stroke but also consider seizure vs. toxic metabolic encephalopathy given initial waxing and waning language exam. Treating as TNK aborted stroke.    EP consulted for ILR implant.

## 2023-08-25 NOTE — PROGRESS NOTE ADULT - SUBJECTIVE AND OBJECTIVE BOX
Please see EP consult note for clinical assessment    Primary ICD-10 CM                                I63.9 – Cerebral infarction	                Procedure- ILR implant  Location- Bedside, [facility]    The rationale for insertion of an implantable loop recorder was discussed with the patient in detail.  The risks of infection, bleeding, pocket hematoma and pain were discussed.  Vendor presence for technical support was also discussed.   Alternatives were reviewed and all questions were answered.  The patient agrees to proceed, and informed consent was signed and placed in the chart.  The patient was in a non-fasting state.    Cloroprep was used to clean procedure site (L chest) and patient was draped in a sterile manner.  The procedure was performed under local anesthetic only.  The site was infiltrated with 1% Lidocaine with 0.001% Epi.  A small incision was made with a specialized scalpel in the region of the fourth intercostal space along the left sternal border.  Using the ILR insertion tool the ILR was “injected” in the subcutaneous plane and deployed (please see CCW report for model number) in the region of optimal R-wave sensing.  Hemostasis was achieved and the wound was closed in a running fashion using 4-0 Monocryl suture.  Skin was closed using Dermabond.  Tegaderm was placed over incision.    Complications- None.  The patient can be discharged post implant.      Plan of Care-   -	Patient to keep Tegaderm on for 24 hours and then should be removed by the patient.    -	Any bleeding, discharge or issues at procedure site please call [phone].    -	Patient should follow up with EP in 2-3 weeks (call [phone] for an appointment unless already scheduled)  -	.  No submerging under water until the incision heals.  The patient may shower 24 hours after implant.  Please don’t rub or pick glue off                and let it fall off on its own.  Avoid ointments or lotions at the incision site.    -	You will be given an ID card and an information booklet.  There will be a phone number to call if you have any questions about remote monitoring please call the             company that makes your ILR.                         o  	[employer] [phone]                   -      For patient -  Any clinical questions please call [phone]    -      For providers - Any questions in the hospital call [phone]

## 2023-08-25 NOTE — SPEECH LANGUAGE PATHOLOGY EVALUATION - SLP PERTINENT HISTORY OF CURRENT PROBLEM
67y Male with PMHx of epilepsy (on PHB with last seizures 40 years ago, semiology: grand mal), HTN, HLD. opiod dependence after a pelvic fracture in March 2023, left shoulder abscess, who presented to [facility] with sudden onset of word-finding difficulties and disorientation.

## 2023-08-25 NOTE — PROGRESS NOTE ADULT - SUBJECTIVE AND OBJECTIVE BOX
Doing well this morning.  Loop recorder just placed by EP.  Denies any discomfort at the site.  Hopes to be able to go home today.     No overnight events reported.     Remaining ROS negative     PHYSICAL EXAM:  Physical exam  General: no acute distress, lying in bed, new dressing on chest after ILR placement  HEENT: NCAT, MMM  Cards: RRR, normal S1S2, no mrg  Pulm: CTAB, no wheeze, crackles, rales or rhonchi  Ab: soft, nontender, nondistended, normoactive bowel sounds  Ext: no edema, erythema or calf asymmetry  Neuro: speech is fluent, moves extremities  Skin: warm and dry, no obvious rashes or lesions    VITAL SIGNS:  Vital Signs Last 24 Hrs  T(C): 36.9 (25 Aug 2023 09:52), Max: 37.1 (25 Aug 2023 06:00)  T(F): 98.4 (25 Aug 2023 09:52), Max: 98.7 (25 Aug 2023 06:00)  HR: 76 (25 Aug 2023 08:39) (64 - 76)  BP: 118/58 (25 Aug 2023 08:39) (107/61 - 145/65)  BP(mean): 83 (25 Aug 2023 08:39) (79 - 98)  RR: 18 (25 Aug 2023 08:39) (17 - 18)  SpO2: 98% (25 Aug 2023 08:39) (97% - 100%)    Parameters below as of 25 Aug 2023 08:39  Patient On (Oxygen Delivery Method): room air          MEDICATIONS:  MEDICATIONS  (STANDING):  aspirin enteric coated 81 milliGRAM(s) Oral daily  atorvastatin 40 milliGRAM(s) Oral at bedtime  buPROPion XL (24-Hour) . 300 milliGRAM(s) Oral daily  clopidogrel Tablet 75 milliGRAM(s) Oral daily  enoxaparin Injectable 40 milliGRAM(s) SubCutaneous every 24 hours  FLUoxetine 80 milliGRAM(s) Oral daily  lamoTRIgine 300 milliGRAM(s) Oral daily  lamoTRIgine 200 milliGRAM(s) Oral at bedtime  levothyroxine 100 MICROGram(s) Oral daily  morphine ER Tablet 15 milliGRAM(s) Oral daily  PHENobarbital 129.6 milliGRAM(s) Oral two times a day  traZODone 50 milliGRAM(s) Oral at bedtime  valsartan 40 milliGRAM(s) Oral daily    MEDICATIONS  (PRN):      ALLERGIES:  Allergies    penicillin (Hives)    Intolerances        LABS:                        9.3    7.85  )-----------( 278      ( 24 Aug 2023 06:58 )             27.7     08-24    130<L>  |  98  |  15  ----------------------------<  97  4.0   |  23  |  0.73    Ca    8.4      24 Aug 2023 19:55  Phos  3.4     08-24  Mg     1.8     08-24    TPro  6.3  /  Alb  4.0  /  TBili  0.3  /  DBili  x   /  AST  14  /  ALT  13  /  AlkPhos  113  08-24      Urinalysis Basic - ( 24 Aug 2023 20:03 )    Color: Yellow / Appearance: Clear / SG: <=1.005 / pH: x  Gluc: x / Ketone: NEGATIVE  / Bili: Negative / Urobili: 0.2 E.U./dL   Blood: x / Protein: NEGATIVE mg/dL / Nitrite: NEGATIVE   Leuk Esterase: NEGATIVE / RBC: x / WBC x   Sq Epi: x / Non Sq Epi: x / Bacteria: x      CAPILLARY BLOOD GLUCOSE          RADIOLOGY & ADDITIONAL TESTS: Reviewed.

## 2023-08-25 NOTE — PROGRESS NOTE ADULT - SUBJECTIVE AND OBJECTIVE BOX
Physical Medicine and Rehabilitation Progress Note :       Patient is a 67y old  Male who presents with a chief complaint of Stroke (24 Aug 2023 22:52)      HPI:  67y Male with PMHx of epilepsy (on PHB with last seizures 40 years ago, semiology: grand mal), HTN, HLD. opiod dependence after a pelvic fracture in March 2023, left shoulder abscess, who presented to [facility] with sudden onset of word-finding difficulties and disorientation. LKW 1400. He was speaking normally this morning and was supposed to go out of the house with his wife when she noticed he couldn't get his words out. On initial evaluation NIHSS was a 4. He scored 2 for questions and 2 for severity of the aphasia. Aphasia seemed to be primarily expressive with some component of receptive. There was no weakness or other deficits. CTH was negative for any acute intracranial pathology. CTA without any large vessel occlusion or high grade stenosis. CTP with Elevated Tmax reported and both posterior cerebral hemispheres and additionally in the left anterior temporal and frontal lobes, unclear if significant.   Dr. Dick Aly thoroughly discussed his PMH with his wife. Patient has opioid dependance due to pelvic fracture from March 2023 but he is in program of titrating down the opioids. He is currently only taking morphine, and the last dose was 15mg yesterday. He is on PHB for seizures and he has been taking it for 40 years, his last seizures were 40 years ago with the grand mal semiology. Otherwise he doesn't have any history of recent surgeries or bleeding. Case discussed with Dr. Dick Aly through telestroke prior to urgent intervention. Risk and benefits of tenecteplase were discussed with patient/family member including risk of symptomatic ICH/death. Decision was made that benefits outweighed risks and tenecteplase was administered.      (22 Aug 2023 19:14)                            9.3    7.85  )-----------( 278      ( 24 Aug 2023 06:58 )             27.7       08-24    130<L>  |  98  |  15  ----------------------------<  97  4.0   |  23  |  0.73    Ca    8.4      24 Aug 2023 19:55  Phos  3.4     08-24  Mg     1.8     08-24    TPro  6.3  /  Alb  4.0  /  TBili  0.3  /  DBili  x   /  AST  14  /  ALT  13  /  AlkPhos  113  08-24    Vital Signs Last 24 Hrs  T(C): 36.9 (25 Aug 2023 09:52), Max: 37.1 (25 Aug 2023 06:00)  T(F): 98.4 (25 Aug 2023 09:52), Max: 98.7 (25 Aug 2023 06:00)  HR: 76 (25 Aug 2023 08:39) (64 - 76)  BP: 118/58 (25 Aug 2023 08:39) (107/61 - 145/65)  BP(mean): 83 (25 Aug 2023 08:39) (79 - 98)  RR: 18 (25 Aug 2023 08:39) (17 - 18)  SpO2: 98% (25 Aug 2023 08:39) (97% - 100%)    Parameters below as of 25 Aug 2023 08:39  Patient On (Oxygen Delivery Method): room air        MEDICATIONS  (STANDING):  aspirin enteric coated 81 milliGRAM(s) Oral daily  atorvastatin 40 milliGRAM(s) Oral at bedtime  buPROPion XL (24-Hour) . 300 milliGRAM(s) Oral daily  clopidogrel Tablet 75 milliGRAM(s) Oral daily  enoxaparin Injectable 40 milliGRAM(s) SubCutaneous every 24 hours  FLUoxetine 80 milliGRAM(s) Oral daily  lamoTRIgine 300 milliGRAM(s) Oral daily  lamoTRIgine 200 milliGRAM(s) Oral at bedtime  levothyroxine 100 MICROGram(s) Oral daily  morphine ER Tablet 15 milliGRAM(s) Oral daily  PHENobarbital 129.6 milliGRAM(s) Oral two times a day  traZODone 50 milliGRAM(s) Oral at bedtime    MEDICATIONS  (PRN):          Initial Functional Status Assessment :       Previous Level of Function:     · Ambulation Skills	independent; needs device  · Transfer Skills	independent; needs device  · ADL Skills	independent; needs device  · Work/Leisure Activity	independent; needs device  · Additional Comments	Pt lives in a walk up apartment with wife. Pt report to be indpt with all ADLs and IADLs prior to admission. Pt had been using    Cognitive Status Examination:   · Orientation	oriented to person, place, time and situation  · Level of Consciousness	alert  · Follows Commands and Answers Questions	100% of the time  · Personal Safety and Judgment	intact  · Short Term Memory	intact  · Long Term Memory	intact    Range of Motion Exam:   · Range of Motion Examination	bilateral lower extremity ROM was WFL (within functional limits)    Manual Muscle Testing:   · Manual Muscle Testing Results	grossly assessed due to  at least 3/5 BL UE & LE based on antigravity mobility.    MMT Knee:     · Knee Flexion	(5) normal, left  (5) normal, right  · Knee Flexion (Semitendinosus)	(5) normal, left  (5) normal, right  · Knee Extension	(5) normal, left  (5) normal, right    Bed Mobility: Rolling/Turning:     · Level of Liberal	independent    Bed Mobility: Scooting/Bridging:     · Level of Liberal	independent    Bed Mobility: Sit to Supine:     · Level of Liberal	independent    Bed Mobility: Supine to Sit:     · Level of Liberal	independent    Transfer: Bed to Chair:     Transfer Skill: Bed to Chair   · Level of Liberal	independent  · Weight-Bearing Restrictions	weight-bearing as tolerated    Transfer: Chair to Bed:     · Level of Liberal	independent  · Weight-Bearing Restrictions	weight-bearing as tolerated    Transfer: Sit to Stand:     · Level of Liberal	independent  · Weight-Bearing Restrictions	weight-bearing as tolerated    Transfer: Stand to Sit:     · Level of Liberal	independent  · Weight-Bearing Restrictions	weight-bearing as tolerated    Gait Skills:     · Level of Liberal	independent  · Weight-Bearing Restrictions	weight-bearing as tolerated  · Gait Distance	150 feet    Gait Analysis:     · Gait Pattern Used	2-point gait    Balance Skills Assessment:     · Sitting Balance: Static	good balance  · Sitting Balance: Dynamic	good balance  · Sit-to-Stand Balance	good minus  · Standing Balance: Static	good balance  · Standing Balance: Dynamic	good balance  · Systems Impairment Contributing to Balance Disturbance	neuromuscular    Sensory Examination:   Sensory Examination:    Grossly Intact:   · Gross Sensory Examination	Grossly Intact    · Coordination Assessed	finger to nose; 3/3 grossly intact b/l      Proprioception:   · Coordination Assessed	finger to nose; 3/3 grossly intact b/l      Treatment Location:   · Comments	R hand dominant; (L) hand  5/5, (R) hand  5/5. CN Testing: B/L Frontalis intact; B/L buccinator intact; smile symmetrical; tongue protrusion at midline; B/L eyes open/close intact; Shoulder elevation: intact bilaterally; Vision H-Test: bilateral tracking and smooth pursuit intact; Convergence/Divergence: intact; Vision Quadrant Test: intact bilaterally    Fine Motor Coordination:   · Left Hand, Finger to Nose	normal performance  · Right Hand, Finger to Nose	normal performance  · Left Hand Thumb/Finger Opposition Skills	normal performance  · Right Hand Thumb/Finger Opposition Skills	normal performance    Upper Body Dressing Training:     · Level of Liberal	independent    Lower Body Dressing Training:     · Level of Liberal	independent; don/doff socks sitting EOB        PM&R Impression : as above    Current Disposition Plan Recommendations :      d/c home with home physical therapy

## 2023-08-25 NOTE — PROGRESS NOTE ADULT - SUBJECTIVE AND OBJECTIVE BOX
Neurology Stroke Progress Note    INTERVAL HPI/OVERNIGHT EVENTS:  Patient seen and examined at beside. No acute events overnight. Patient feels well, no complaints. ILR placed this morning at bedside. Hgb downtrending but no signs of bleeding, will trend in the afternoon.     ROS: otherwise negative      T(C): 36.9 (08-25-23 @ 09:52), Max: 37.1 (08-25-23 @ 06:00)  HR: 68 (08-25-23 @ 12:42) (64 - 76)  BP: 99/56 (08-25-23 @ 12:42) (99/56 - 145/65)  RR: 18 (08-25-23 @ 12:42) (18 - 18)  SpO2: 98% (08-25-23 @ 12:42) (97% - 99%)  Wt(kg): --Vital Signs Last 24 Hrs  T(C): 36.9 (25 Aug 2023 09:52), Max: 37.1 (25 Aug 2023 06:00)  T(F): 98.4 (25 Aug 2023 09:52), Max: 98.7 (25 Aug 2023 06:00)  HR: 68 (25 Aug 2023 12:42) (64 - 76)  BP: 99/56 (25 Aug 2023 12:42) (99/56 - 145/65)  BP(mean): 70 (25 Aug 2023 12:42) (70 - 93)  RR: 18 (25 Aug 2023 12:42) (18 - 18)  SpO2: 98% (25 Aug 2023 12:42) (97% - 99%)    Parameters below as of 25 Aug 2023 12:42  Patient On (Oxygen Delivery Method): room air        PHYSICAL EXAM:  General: No acute distress, awake and alert  Eyes: Anicteric sclerae, moist conjunctivae, see below for CNs  Neck: trachea midline  Cardiovascular: Regular rate and rhythm on monitor  Pulmonary: No use of accessory muscles  GI: Abdomen soft, non-distended, non-tender  Extremities: no edema    Neurologic:  -Mental status: Awake, alert, oriented to person, place, and time. Speech is fluent with intact naming, some paraphrasic errors (word substitution), intact comprehension, no dysarthria. Recent and remote memory intact. Follows two and three step commands. Attention/concentration intact. Fund of knowledge appropriate (unable to name who the  is).  -Cranial nerves:   II: Visual fields are full to confrontation.  III, IV, VI: Extraocular movements are intact without nystagmus. Pupils equally round and reactive to light  V:  Facial sensation V1-V3 equal and intact   VII: Face is symmetric with normal smile  Motor: Normal bulk and tone. No pronator drift. Strength bilateral upper extremity 5/5, bilateral lower extremities 5/5.  Rapid alternating movements intact and symmetric  Sensation: Intact to light touch bilaterally. No neglect or extinction on double simultaneous testing.  Coordination: No dysmetria on finger-to-nose  bilaterally   Gait: deferred        LABS:                        8.6    7.36  )-----------( 237      ( 25 Aug 2023 12:05 )             25.7     08-24    130<L>  |  98  |  15  ----------------------------<  97  4.0   |  23  |  0.73    Ca    8.4      24 Aug 2023 19:55  Phos  3.4     08-24  Mg     1.8     08-24    TPro  6.3  /  Alb  4.0  /  TBili  0.3  /  DBili  x   /  AST  14  /  ALT  13  /  AlkPhos  113  08-24        MEDICATIONS  (STANDING):  aspirin enteric coated 81 milliGRAM(s) Oral daily  atorvastatin 40 milliGRAM(s) Oral at bedtime  buPROPion XL (24-Hour) . 300 milliGRAM(s) Oral daily  clopidogrel Tablet 75 milliGRAM(s) Oral daily  enoxaparin Injectable 40 milliGRAM(s) SubCutaneous every 24 hours  FLUoxetine 80 milliGRAM(s) Oral daily  lamoTRIgine 200 milliGRAM(s) Oral at bedtime  lamoTRIgine 300 milliGRAM(s) Oral daily  levothyroxine 100 MICROGram(s) Oral daily  morphine ER Tablet 15 milliGRAM(s) Oral daily  PHENobarbital 129.6 milliGRAM(s) Oral two times a day  traZODone 50 milliGRAM(s) Oral at bedtime  valsartan 40 milliGRAM(s) Oral daily    MEDICATIONS  (PRN):      RADIOLOGY & ADDITIONAL TESTS: Reviewed    MR Head No Cont (08.23.23 @ 23:06) >  IMPRESSION: No acute intracranial process. Cortical involutional and deep   white matter small vessel changes.

## 2023-08-25 NOTE — CONSULT NOTE ADULT - SUBJECTIVE AND OBJECTIVE BOX
68 yo M with PMH of epilepsy (on PHB with last seizures 40 years ago, semiology: grand mal), HTN, HLD. opiod dependence, skin abscess, who presented to [facility] with sudden onset of word-finding difficulties and disorientation. He is accompanied by his wife who witnessed the onset of the symptoms at 1400. On my initial evaluation NIHSS is 4. He scored 2 for questions and 2 for severity of the aphasia. There was no weakness or other deficits.   I thoroughly discussed his PMH with the wife. Patient has opioid dependance due to chronic pain but he is in program of titrating down the opiods. He is currently only taking morphine, and the last dose was yesterday. He is on PHB for seizures and he has been taking it for 40 years, his last seizures were 40 years ago with the grand mal semiology. Otherwise he doesn't have any history of recent surgeries or bleeding. There are no CI for TNK. His BS and BP were normal.     ROS: negative except as above   
CHIEF COMPLAINT: Stroke    HISTORY OF PRESENT ILLNESS: 67y Male with PMHx of epilepsy (on PHB with last seizures 40 years ago, semiology: grand mal), HTN, HLD. opioid dependence after a pelvic fracture in March 2023, left shoulder abscess, who presented to [facility] with sudden onset of word-finding difficulties and disorientation. Stroke code called at [facility] ED. NIHSS initially a 4, improved to a 2 prior to TNK push. CTH was negative for any acute intracranial pathology. CTA without any large vessel occlusion or high grade stenosis. CTP with Elevated Tmax reported and both posterior cerebral hemispheres and additionally in the left anterior temporal and frontal lobes, unclear if significant. Decision was made to administer tenecteplase. Patient transferred to [facility] for further working and post TNK monitoring in the MICU. 24 hour CTH stable, patient transferred to stroke tele for further workup. Exam now non-focal, likely TNK aborted stroke but also consider seizure vs. toxic metabolic encephalopathy given initial waxing and waning language exam. Treating as TNK aborted stroke.    EP consulted for ILR implant.    PAST MEDICAL & SURGICAL HISTORY:  Prostate cancer    Allergies  penicillin (Hives)    MEDICATIONS:  buPROPion XL (24-Hour) . 300 milliGRAM(s) Oral daily  FLUoxetine 80 milliGRAM(s) Oral daily  lamoTRIgine 200 milliGRAM(s) Oral at bedtime  lamoTRIgine 300 milliGRAM(s) Oral daily  morphine ER Tablet 15 milliGRAM(s) Oral daily  PHENobarbital 129.6 milliGRAM(s) Oral two times a day  traZODone 50 milliGRAM(s) Oral at bedtime  atorvastatin 40 milliGRAM(s) Oral at bedtime  levothyroxine 100 MICROGram(s) Oral daily  aspirin enteric coated 81 milliGRAM(s) Oral daily  clopidogrel Tablet 75 milliGRAM(s) Oral daily  enoxaparin Injectable 40 milliGRAM(s) SubCutaneous every 24 hours    SOCIAL HISTORY:    [X] Non-smoker  [ ] Smoker  [ ] Alcohol    REVIEW OF SYSTEMS:    CONSTITUTIONAL: No fever, weight loss, or fatigue  EYES: No eye pain, visual disturbances, or discharge  ENMT:  No difficulty hearing, tinnitus, vertigo; No sinus or throat pain  NECK: No pain or stiffness  BREASTS: No pain, masses, or nipple discharge  RESPIRATORY: No cough, wheezing, chills or hemoptysis; No Shortness of Breath  CARDIOVASCULAR: No chest pain, palpitations, dizziness, or leg swelling  GASTROINTESTINAL: No abdominal or epigastric pain. No nausea, vomiting, or hematemesis; No diarrhea or constipation. No melena or hematochezia.  GENITOURINARY: No dysuria, frequency, hematuria, or incontinence  NEUROLOGICAL: No headaches, memory loss, loss of strength, numbness, or tremors  SKIN: No itching, burning, rashes, or lesions   LYMPH Nodes: No enlarged glands  ENDOCRINE: No heat or cold intolerance; No hair loss  MUSCULOSKELETAL: No joint pain or swelling; No muscle, back, or extremity pain  PSYCHIATRIC: No depression, anxiety, mood swings, or difficulty sleeping  HEME/LYMPH: No easy bruising, or bleeding gums  ALLERY AND IMMUNOLOGIC: No hives or eczema	    [X] All others negative	  [ ] Unable to obtain    PHYSICAL EXAM:  T(C): 36.9 (08-25-23 @ 09:52), Max: 37.1 (08-25-23 @ 06:00)  HR: 76 (08-25-23 @ 08:39) (64 - 76)  BP: 118/58 (08-25-23 @ 08:39) (107/61 - 145/65)  RR: 18 (08-25-23 @ 08:39) (17 - 18)  SpO2: 98% (08-25-23 @ 08:39) (97% - 100%)    I&O's Summary    24 Aug 2023 07:01  -  25 Aug 2023 07:00  --------------------------------------------------------  IN: 0 mL / OUT: 400 mL / NET: -400 mL    TELEMETRY: SR	      Appearance: Normal	  HEENT:   Normal oral mucosa, PERRL, EOMI	  Cardiovascular: Normal S1 S2, No JVD, No murmurs, No edema  Respiratory: Lungs clear to auscultation	  Gastrointestinal:  Soft, Non-tender, + BS	  Neurologic: A&O x 3, Non-focal  Extremities: Normal range of motion, No clubbing, cyanosis or edema  Vascular: Peripheral pulses palpable 2+ bilaterally    	  LABS:	 	                        9.3    7.85  )-----------( 278      ( 24 Aug 2023 06:58 )             27.7     08-24    130<L>  |  98  |  15  ----------------------------<  97  4.0   |  23  |  0.73    Ca    8.4      24 Aug 2023 19:55  Phos  3.4     08-24  Mg     1.8     08-24    TPro  6.3  /  Alb  4.0  /  TBili  0.3  /  DBili  x   /  AST  14  /  ALT  13  /  AlkPhos  113  08-24        
 **STROKE CONSULT NOTE**    Last known well time/Time of onset of symptoms:     HPI: 67y Male with PMHx of epilepsy (on PHB with last seizures 40 years ago, semiology: grand mal), HTN, HLD. opiod dependence after a pelvic fracture in March 2023, left shoulder abscess, who presented to [facility] with sudden onset of word-finding difficulties and disorientation. LKW 1400. He was speaking normally this morning and was supposed to go out of the house with his wife when she noticed he couldn't get his words out. On initial evaluation NIHSS was a 4. He scored 2 for questions and 2 for severity of the aphasia. Aphasia seemed to be primarily expressive with some component of receptive. There was no weakness or other deficits. CTH was negative for any acute intracranial pathology. CTA without any large vessel occlusion or high grade stenosis. CTP with Elevated Tmax reported and both posterior cerebral hemispheres and additionally in the left anterior temporal and frontal lobes, unclear if significant.   Dr. Dick Aly thoroughly discussed his PMH with his wife. Patient has opioid dependance due to pelvic fracture from March 2023 but he is in program of titrating down the opioids. He is currently only taking morphine, and the last dose was 15mg yesterday. He is on PHB for seizures and he has been taking it for 40 years, his last seizures were 40 years ago with the grand mal semiology. Otherwise he doesn't have any history of recent surgeries or bleeding. Case discussed with Dr. Dick Aly through telestroke prior to urgent intervention. Risk and benefits of tenecteplase were discussed with patient/family member including risk of symptomatic ICH/death. Decision was made that benefits outweighed risks and tenecteplase was administered.       T(C): 36.7 (08-22-23 @ 16:45), Max: 36.7 (08-22-23 @ 15:20)  HR: 70 (08-22-23 @ 18:00) (68 - 88)  BP: 144/74 (08-22-23 @ 17:45) (120/77 - 156/88)  RR: 25 (08-22-23 @ 18:00) (16 - 25)  SpO2: 98% (08-22-23 @ 18:00) (94% - 100%)    PAST MEDICAL & SURGICAL HISTORY:  Prostate cancer          FAMILY HISTORY:      SOCIAL HISTORY:   Patient lives with wife at home.   Smoking status: Denies  Drinking: Occasional  Drug Use: Denies    ROS: As per HPI    MEDICATIONS  (STANDING):  buPROPion XL (24-Hour) . 300 milliGRAM(s) Oral daily  FLUoxetine 80 milliGRAM(s) Oral daily  lamoTRIgine 200 milliGRAM(s) Oral at bedtime  levothyroxine 100 MICROGram(s) Oral daily  magnesium sulfate  IVPB 1 Gram(s) IV Intermittent Once  morphine ER Tablet 15 milliGRAM(s) Oral daily  PHENobarbital 129.6 milliGRAM(s) Oral two times a day  traZODone 50 milliGRAM(s) Oral at bedtime    MEDICATIONS  (PRN):    Allergies    penicillin (Hives)    Intolerances      Vital Signs Last 24 Hrs  T(C): 36.7 (22 Aug 2023 16:45), Max: 36.7 (22 Aug 2023 15:20)  T(F): 98 (22 Aug 2023 16:45), Max: 98 (22 Aug 2023 15:20)  HR: 70 (22 Aug 2023 18:00) (68 - 88)  BP: 144/74 (22 Aug 2023 17:45) (120/77 - 156/88)  BP(mean): 97 (22 Aug 2023 17:45) (97 - 107)  RR: 25 (22 Aug 2023 18:00) (16 - 25)  SpO2: 98% (22 Aug 2023 18:00) (94% - 100%)    Parameters below as of 22 Aug 2023 18:00  Patient On (Oxygen Delivery Method): room air        Physical exam:  Constitutional: No acute distress, conversant  Eyes: Anicteric sclerae, moist conjunctivae, see below for CNs  Neck: trachea midline  Cardiovascular: Regular rate and rhythm on monitor  Pulmonary: No use of accessory muscles  GI: Abdomen soft  Extremities: , no edema    Neurologic:  -Mental status: Awake, alert, oriented to person, place, and month and year with choices. Speech is fragmented but has intact naming, repetition, and comprehension, no dysarthria. Recent and remote memory intact but difficulty completing full thoughts d/t aphasia. Follows simple and 2-step commands, had difficulty with 3 step commands. Attention/concentration intact. Fund of knowledge appropriate.  -Cranial nerves:   II: Visual fields are full to confrontation.  III, IV, VI: Extraocular movements are intact without nystagmus. Pupils equally round and reactive to light  V:  Facial sensation V1-V3 equal and intact   VII: Face is symmetric with normal smile  Motor: Normal bulk and tone. No pronator drift. Strength bilateral upper extremity 5/5, bilateral lower extremities 5/5.  Rapid alternating movements intact and symmetric  Sensation: Intact to light touch bilaterally. No neglect or extinction on double simultaneous testing.  Coordination: No dysmetria on finger-to-nose bilaterally      NIHSS: 2    Fingerstick Blood Glucose: CAPILLARY BLOOD GLUCOSE      POCT Blood Glucose.: 133 mg/dL (22 Aug 2023 15:21)    LABS:                        13.0   7.61  )-----------( 379      ( 22 Aug 2023 15:34 )             37.6     08-22    131<L>  |  94<L>  |  20  ----------------------------<  125<H>  3.7   |  20<L>  |  1.03    Ca    9.1      22 Aug 2023 15:34  Mg     1.5     08-22    TPro  7.6  /  Alb  4.6  /  TBili  0.5  /  DBili  x   /  AST  26  /  ALT  30  /  AlkPhos  145<H>  08-22    PT/INR - ( 22 Aug 2023 15:34 )   PT: 11.6 sec;   INR: 1.06          PTT - ( 22 Aug 2023 15:34 )  PTT:33.4 sec      Urinalysis Basic - ( 22 Aug 2023 15:34 )    Color: x / Appearance: x / SG: x / pH: x  Gluc: 125 mg/dL / Ketone: x  / Bili: x / Urobili: x   Blood: x / Protein: x / Nitrite: x   Leuk Esterase: x / RBC: x / WBC x   Sq Epi: x / Non Sq Epi: x / Bacteria: x        RADIOLOGY & ADDITIONAL STUDIES:    CT Brain Stroke Protocol (08.22.23 @ 15:36) >  IMPRESSION:  No acute intracranial hemorrhage or demarcated transcortical infarction.     CT Angio Neck Stroke Protocol w/ IV Cont (08.22.23 @ 15:52) >  IMPRESSION:  No cervical arterial steno-occlusive disease.    CT Angio Brain Stroke Protocol  w/ IV Cont (08.22.23 @ 15:53) >  IMPRESSION:  No intracranial arterial steno-occlusive disease.     CT Brain Perfusion Maps Stroke (08.22.23 @ 15:49) >  IMPRESSION:    Abnormal CT perfusion.    Elevated Tmax times in both anterior and posterior territories not   consistent with any particular large vessel occlusion. Small   parietooccipital CBV deficits without discrete infarct on head CT.   Consider follow-up CT/MR.                  -----------------------------------------------------------------------------------------------------------------  IV-tNK (Y/N):    Y                              Bolus time:    8/22/23 @ 1617  Reason IV-tNK not given:   
See below for stroke HPI:  "67y Male with PMHx of epilepsy (on PHB with last seizures 40 years ago, semiology: grand mal), HTN, HLD. opiod dependence after a pelvic fracture in March 2023, left shoulder abscess, who presented to [facility] with sudden onset of word-finding difficulties and disorientation. LKW 1400. He was speaking normally this morning and was supposed to go out of the house with his wife when she noticed he couldn't get his words out. On initial evaluation NIHSS was a 4. He scored 2 for questions and 2 for severity of the aphasia. Aphasia seemed to be primarily expressive with some component of receptive. There was no weakness or other deficits. CTH was negative for any acute intracranial pathology. CTA without any large vessel occlusion or high grade stenosis. CTP with Elevated Tmax reported and both posterior cerebral hemispheres and additionally in the left anterior temporal and frontal lobes, unclear if significant.   Dr. Dick Aly thoroughly discussed his PMH with his wife. Patient has opioid dependance due to pelvic fracture from March 2023 but he is in program of titrating down the opioids. He is currently only taking morphine, and the last dose was 15mg yesterday. He is on PHB for seizures and he has been taking it for 40 years, his last seizures were 40 years ago with the grand mal semiology. Otherwise he doesn't have any history of recent surgeries or bleeding. Case discussed with Dr. Dick Aly through telestroke prior to urgent intervention. Risk and benefits of tenecteplase were discussed with patient/family member including risk of symptomatic ICH/death. Decision was made that benefits outweighed risks and tenecteplase was administered. "    Currently denies any symptoms, and about to undergo COY.  Happy that the EEG leads will be coming off later.  He is aware that his sodium has been low in the past, but explains that no one has worked it up.  States that he has gotten labs with his PCP.        Review of Systems:  Other Review of Systems: All other review of systems negative, except as noted in HPI      Allergies and Intolerances: Penicillin    Home Medications:   * Patient Currently Takes Medications as of 22-Aug-2023 18:14 documented in Structured Notes  · 	buPROPion 300 mg/24 hours (XL) oral tablet, extended release: Last Dose Taken:  , 1 tab(s) orally once a day  · 	FLUoxetine 40 mg oral capsule: Last Dose Taken:  , 2 cap(s) orally once a day  · 	Synthroid 100 mcg (0.1 mg) oral tablet: 1 tab(s) orally once a day  · 	morphine 15 mg/12 hr oral tablet, extended release: 1 tab(s) orally once a day In the evening for 7 days from 8/21  · 	PHENobarbital 64.8 mg oral tablet: 2 tab(s) orally 2 times a day  · 	traZODone 50 mg oral tablet: 1 tab(s) orally once a day (at bedtime)  · 	lamoTRIgine 100 mg oral tablet: Last Dose Taken:  , 3 tab(s) orally once a day (in the morning) Take 3 tabs in the morning, 2 tabs a night  · 	atorvastatin 40 mg oral tablet: Last Dose Taken:  , 1 tab(s) orally once a day (at bedtime)  · 	valsartan 160 mg oral capsule: Last Dose Taken:  , 1 cap(s) orally once a day    Social history: no toxic habits    Vital Signs Last 24 Hrs  T(C): 36.8 (24 Aug 2023 05:01), Max: 37.1 (23 Aug 2023 18:00)  T(F): 98.2 (24 Aug 2023 05:01), Max: 98.8 (23 Aug 2023 18:00)  HR: 69 (24 Aug 2023 12:37) (66 - 107)  BP: 137/71 (24 Aug 2023 12:37) (101/57 - 137/71)  BP(mean): 98 (24 Aug 2023 12:37) (75 - 98)  RR: 17 (24 Aug 2023 12:37) (17 - 27)  SpO2: 100% (24 Aug 2023 12:37) (97% - 100%)    Parameters below as of 24 Aug 2023 12:37  Patient On (Oxygen Delivery Method): room air    Physical exam  General: no acute distress, EEG leads applied, lying in bed  HEENT: NCAT, MMM  Cards: RRR, normal S1S2, no mrg  Pulm: CTAB, no wheeze, crackles, rales or rhonchi  Ab: soft, nontender, nondistended, normoactive bowel sounds  Ext: no edema, erythema or calf asymmetry  Neuro: speech is fluent, moves extremities  Skin: warm and dry, no obvious rashes or lesions  
  Patient is a 67y old  Male who presents with a chief complaint of Stroke (22 Aug 2023 19:14)      HPI:  67y Male with PMHx of epilepsy (on PHB with last seizures 40 years ago, semiology: grand mal), HTN, HLD. opiod dependence after a pelvic fracture in March 2023, left shoulder abscess, who presented to [facility] with sudden onset of word-finding difficulties and disorientation. LKW 1400. He was speaking normally this morning and was supposed to go out of the house with his wife when she noticed he couldn't get his words out. On initial evaluation NIHSS was a 4. He scored 2 for questions and 2 for severity of the aphasia. Aphasia seemed to be primarily expressive with some component of receptive. There was no weakness or other deficits. CTH was negative for any acute intracranial pathology. CTA without any large vessel occlusion or high grade stenosis. CTP with Elevated Tmax reported and both posterior cerebral hemispheres and additionally in the left anterior temporal and frontal lobes, unclear if significant.   Dr. Dick Aly thoroughly discussed his PMH with his wife. Patient has opioid dependance due to pelvic fracture from March 2023 but he is in program of titrating down the opioids. He is currently only taking morphine, and the last dose was 15mg yesterday. He is on PHB for seizures and he has been taking it for 40 years, his last seizures were 40 years ago with the grand mal semiology. Otherwise he doesn't have any history of recent surgeries or bleeding. Case discussed with Dr. Dick Aly through telestroke prior to urgent intervention. Risk and benefits of tenecteplase were discussed with patient/family member including risk of symptomatic ICH/death. Decision was made that benefits outweighed risks and tenecteplase was administered.      (22 Aug 2023 19:14)    PAST MEDICAL & SURGICAL HISTORY:  Prostate cancer        MEDICATIONS  (STANDING):  buPROPion XL (24-Hour) . 300 milliGRAM(s) Oral daily  FLUoxetine 80 milliGRAM(s) Oral daily  lamoTRIgine 200 milliGRAM(s) Oral at bedtime  lamoTRIgine 300 milliGRAM(s) Oral daily  levothyroxine 100 MICROGram(s) Oral daily  morphine ER Tablet 15 milliGRAM(s) Oral daily  PHENobarbital 129.6 milliGRAM(s) Oral two times a day  traZODone 50 milliGRAM(s) Oral at bedtime    MEDICATIONS  (PRN):            FAMILY HISTORY:      CBC Full  -  ( 23 Aug 2023 05:29 )  WBC Count : 12.91 K/uL  RBC Count : 3.85 M/uL  Hemoglobin : 11.3 g/dL  Hematocrit : 33.2 %  Platelet Count - Automated : 328 K/uL  Mean Cell Volume : 86.2 fl  Mean Cell Hemoglobin : 29.4 pg  Mean Cell Hemoglobin Concentration : 34.0 gm/dL  Auto Neutrophil # : x  Auto Lymphocyte # : x  Auto Monocyte # : x  Auto Eosinophil # : x  Auto Basophil # : x  Auto Neutrophil % : x  Auto Lymphocyte % : x  Auto Monocyte % : x  Auto Eosinophil % : x  Auto Basophil % : x      08-23    133<L>  |  99  |  19  ----------------------------<  127<H>  4.1   |  23  |  0.92    Ca    9.0      23 Aug 2023 05:29  Phos  3.9     08-23  Mg     2.2     08-23    TPro  7.6  /  Alb  4.6  /  TBili  0.5  /  DBili  x   /  AST  26  /  ALT  30  /  AlkPhos  145<H>  08-22      Urinalysis Basic - ( 23 Aug 2023 05:29 )    Color: x / Appearance: x / SG: x / pH: x  Gluc: 127 mg/dL / Ketone: x  / Bili: x / Urobili: x   Blood: x / Protein: x / Nitrite: x   Leuk Esterase: x / RBC: x / WBC x   Sq Epi: x / Non Sq Epi: x / Bacteria: x        Radiology :     < from: CT Brain Stroke Protocol (08.22.23 @ 15:36) >  ACC: [id number] EXAM:  CT BRAIN STROKE PROTOCOL   ORDERED BY: AMRIT RAMON     PROCEDURE DATE:  08/22/2023          INTERPRETATION:  INDICATIONS: Stroke code, nausea    TECHNIQUE:  Serial axial images were obtained from the skull base to the   vertexwithout the use of intravenous contrast. Coronal and sagittal   reformatted images were obtained. Rapid-AI software is utilized for   preliminary hemorrhage detection.    COMPARISON EXAMINATION: None.    FINDINGS:  VENTRICLES AND SULCI: Generalized parenchymal volume loss with   enlargement of the sulci, cisternal spaces and are ventricles advanced   for the patient's age.  INTRA-AXIAL: No intracranial mass, acute hemorrhage, or midline shift is   present. There is preservation of the gray-white matter differentiation   without evidence of acute transcortical infarction. Mild small vessel   ischemic low density appreciated in the periventricular white matter.  EXTRA-AXIAL: No extra-axial fluid collection is present.  VISUALIZED SINUSES: Bilateral maxillary polyps versus retention cysts.  VISUALIZED MASTOIDS:  Clear.  CALVARIUM:  Normal.      IMPRESSION:  No acute intracranial hemorrhage or demarcated transcortical infarction.      < from: CT Brain Perfusion Maps Stroke (08.22.23 @ 15:49) >  ACC: [id number] EXAM:  CT BRAIN PERFUSION MAPS STROKE   ORDERED BY: AMRIT RAMON     PROCEDURE DATE:  08/22/2023          INTERPRETATION:  CT Perfusion    INDICATION: Stroke code, nausea    TECHNIQUE: After the bolus administration of 45 mL of Omnipaque-350, 25   cc discarded, serial axial images were obtained through the brain. The CT   perfusion data set was post processed per [facility] protocol   generating color maps of CBF, CBV, MTT, and Tmax.    COMPARISON: None    Following measurements were obtained on perfusion maps:  CBF <  30%: 0 mL  Tmax >6s: 89 mL  Mismatched volume: 89 mL  Mismatched ratio: Infinite    Elevated Tmax reported and both posterior cerebral hemispheres and   additionally in the left anterior temporal and frontal lobes. Small sites   of decreased cerebral blood volume are reported in the parieto-occipital   lobes as well.      IMPRESSION:    Abnormal CT perfusion.      < from: CT Angio Brain Stroke Protocol  w/ IV Cont (08.22.23 @ 15:53) >  ACC: [id number] EXAM:  CT ANGIO BRAIN STROKE PROTC IC   ORDERED BY: AMRIT RAMON     ACC: [id number] EXAM:  CT ANGIO NECK STROKE PROTCL IC   ORDERED BY: AMRIT RAMON     PROCEDURE DATE:  08/22/2023          INTERPRETATION:  PROCEDURE: CTA brain with intravenous contrast.    INDICATION: Stroke code, nausea    TECHNIQUE: Multiple axial thin section were obtained through the [employer]   [employer] [place] following the intravenous bolus injection of 80 cc Omnipaque   350. MIP series are provided.    COMPARISON: None    FINDINGS:  The internal carotid arteries are patent at the skull base and   intracranial compartment without occlusion or high grade stenosis. The   anterior and middle cerebral arteries are patent at their 1st and 2nd   order segments, andappear symmetric caliber. Hypoplastic right A1 with   an anterior communicating artery. There is a right posterior   communicating artery. The posterior circulation shows no high grade   stenosis or occlusion. Right P1 and V4 segments are hypoplastic. There is   no site of aneurysm within the resolution limitations of CTA.      IMPRESSION:  No intracranial arterial steno-occlusive disease.    --------------------------------------------------------------  PROCEDURE: CTA neck with intravenous contrast.    INDICATION: Stroke code, nausea    TECHNIQUE: Multiple axial thin section were obtained through the neck   following the intravenous bolus injection of Omnipaque 350 as above. MIP   series are provided.    COMPARISON: None.    FINDINGS:  The aortic arch is normal size and shows patency, with normal 3 vessel   configuration and scants sites of calcified plaque. No great vessel   stenosis.    Both common carotid arteries are patent up to the bifurcations where   there is mild calcified plaque.    The right internal carotid artery is patent up to the skull base, without   hemodynamically significant stenosis or occlusion.    The left internal carotid artery is patent up to the skull base, without   hemodynamically significant stenosis orocclusion.    The cervical vertebral arteries are patent throughout, without   hemodynamically significant stenosis or occlusion, left being mildly   dominant.      IMPRESSION:  No cervical arterial steno-occlusive disease.          Review of Systems : per HPI         Vital Signs Last 24 Hrs  T(C): 36.7 (23 Aug 2023 05:04), Max: 36.7 (22 Aug 2023 15:20)  T(F): 98 (23 Aug 2023 05:04), Max: 98 (22 Aug 2023 15:20)  HR: 66 (23 Aug 2023 08:00) (66 - 88)  BP: 102/62 (23 Aug 2023 07:00) (99/64 - 178/146)  BP(mean): 75 (23 Aug 2023 07:00) (75 - 159)  RR: 18 (23 Aug 2023 08:00) (16 - 37)  SpO2: 97% (23 Aug 2023 08:00) (94% - 100%)    Parameters below as of 23 Aug 2023 08:00  Patient On (Oxygen Delivery Method): room air            Physical Exam :  in MICU s/p TNK , 67 y o man lying comfortably in semi Noel's position , awake , alert , no acute complaints      Head : normocephalic , atraumatic    Eyes : PERRLA , EOMI , no nystagmus , sclera anicteric    ENT / FACE : neg nasal discharge , uvula midline , no oropharyngeal erythema / exudate    Neck : supple , negative JVD , negative carotid bruits , no thyromegaly    Chest : CTA bilaterally , neg wheeze / rhonchi / rales / crackles / egophany    Cardiovascular : regular rate and rhythm , neg murmurs / rubs / gallops    Abdomen : soft , non distended , non tender to palpation in all 4 quadrants ,  normal bowel sounds     Extremities : WWP , neg cyanosis /clubbing / edema     Neurologic Exam :    Alert and oriented to person , place , date/year , speech fluent w/o dysarthria , follows commands , recent and remote memory intact , repetition intact , comprehension intact ,  attention/concentration intact , fund of knowledge appropriate    Cranial Nerves:     II :                         pupils equal , round and reactive to light , visual fields intact   III/ IV/VI :              extraocular movements intact , neg nystagmus , neg ptosis  V :                        facial sensation intact , V1-3 normal  VII :                      face symmetric , no droop , normal eye closure and smile  VIII :                     hearing intact to finger rub bilaterally  IX and X :             no hoarseness , gag intact , palate/ uvula rise symmetrically  XI :                       SCM / trapezius strength intact bilateral  XII :                      no tongue deviation    Motor Exam:          Right UE:               no focal weakness ,  > 3+/5 throughout  , no pronator drift     Left UE:                 no focal weakness ,  > 3+/5 throughout  , no pronator drift         Right LE:    no focal weakness ,  > 3+/5 throughout        Left LE:    no focal weakness ,  > 3+/5 throughout         Sensation :         intact to light touch x 4 extremities                            no neglect or extinction on double simultaneous testing      DTR :     biceps/brachioradialis : equal                      patella/ankle : equal     neg Babinski       Coordination :      Finger to Nose :  neg dysmetria bilaterally       Gait :  not tested          PM&R Impression :     admitted for c/o  word-finding difficulties and disorientation , s/p TNK          Recommendations / Plan :      1) Physical / Occupational therapy focusing on therapeutic exercises , equipment evaluation , bed mobility/transfer out of bed evaluation , progressive ambulation with assistive devices prn .    2) Current disposition plan recommendation  :    pending functional progress

## 2023-08-25 NOTE — SPEECH LANGUAGE PATHOLOGY EVALUATION - COMMENTS
Oral motor structures and functions were intact for speech and swallow. Per Neuro note: "CTH was negative for any acute intracranial pathology. CTA without any large vessel occlusion or high grade stenosis. CTP with Elevated Tmax reported and both posterior cerebral hemispheres and additionally in the left anterior temporal and frontal lobes, unclear if significant. Decision was made to administer tenecteplase. Patient transferred to [facility] for further working and post TNK monitoring in the MICU. 24 hour CTH stable, patient transferred to stroke tele for further workup. Exam now non-focal, likely TNK aborted stroke but also consider seizure vs. toxic metabolic encephalopathy given initial waxing and waning language exam. Treating as TNK aborted stroke."    Pt endorsed initial word-finding difficulty and feeling like he was "forcing words", which prompted admission to hospital. After administration of TNK, pt reported expressive language difficulty greatly improved. Today, expressive and receptive language is "back to normal".

## 2023-08-26 ENCOUNTER — TRANSCRIPTION ENCOUNTER (OUTPATIENT)
Age: 68
End: 2023-08-26

## 2023-08-26 VITALS — TEMPERATURE: 98 F

## 2023-08-26 LAB
FOLATE SERPL-MCNC: 4.2 NG/ML — LOW
FOLATE SERPL-MCNC: 4.4 NG/ML — LOW
HCT VFR BLD CALC: 26.3 % — LOW (ref 39–50)
HGB BLD-MCNC: 8.8 G/DL — LOW (ref 13–17)
MCHC RBC-ENTMCNC: 29.6 PG — SIGNIFICANT CHANGE UP (ref 27–34)
MCHC RBC-ENTMCNC: 33.5 GM/DL — SIGNIFICANT CHANGE UP (ref 32–36)
MCV RBC AUTO: 88.6 FL — SIGNIFICANT CHANGE UP (ref 80–100)
NRBC # BLD: 0 /100 WBCS — SIGNIFICANT CHANGE UP (ref 0–0)
PLATELET # BLD AUTO: 270 K/UL — SIGNIFICANT CHANGE UP (ref 150–400)
RBC # BLD: 2.97 M/UL — LOW (ref 4.2–5.8)
RBC # FLD: 13.5 % — SIGNIFICANT CHANGE UP (ref 10.3–14.5)
VIT B12 SERPL-MCNC: 545 PG/ML — SIGNIFICANT CHANGE UP (ref 232–1245)
VIT B12 SERPL-MCNC: 571 PG/ML — SIGNIFICANT CHANGE UP (ref 232–1245)
WBC # BLD: 7 K/UL — SIGNIFICANT CHANGE UP (ref 3.8–10.5)
WBC # FLD AUTO: 7 K/UL — SIGNIFICANT CHANGE UP (ref 3.8–10.5)

## 2023-08-26 PROCEDURE — 82570 ASSAY OF URINE CREATININE: CPT

## 2023-08-26 PROCEDURE — 93312 ECHO TRANSESOPHAGEAL: CPT

## 2023-08-26 PROCEDURE — 85610 PROTHROMBIN TIME: CPT

## 2023-08-26 PROCEDURE — 84484 ASSAY OF TROPONIN QUANT: CPT

## 2023-08-26 PROCEDURE — 83010 ASSAY OF HAPTOGLOBIN QUANT: CPT

## 2023-08-26 PROCEDURE — 36415 COLL VENOUS BLD VENIPUNCTURE: CPT

## 2023-08-26 PROCEDURE — 80184 ASSAY OF PHENOBARBITAL: CPT

## 2023-08-26 PROCEDURE — 70551 MRI BRAIN STEM W/O DYE: CPT

## 2023-08-26 PROCEDURE — 96374 THER/PROPH/DIAG INJ IV PUSH: CPT

## 2023-08-26 PROCEDURE — 93306 TTE W/DOPPLER COMPLETE: CPT

## 2023-08-26 PROCEDURE — C1764: CPT

## 2023-08-26 PROCEDURE — 93005 ELECTROCARDIOGRAM TRACING: CPT

## 2023-08-26 PROCEDURE — 86803 HEPATITIS C AB TEST: CPT

## 2023-08-26 PROCEDURE — 71045 X-RAY EXAM CHEST 1 VIEW: CPT

## 2023-08-26 PROCEDURE — 85045 AUTOMATED RETICULOCYTE COUNT: CPT

## 2023-08-26 PROCEDURE — 81003 URINALYSIS AUTO W/O SCOPE: CPT

## 2023-08-26 PROCEDURE — 84443 ASSAY THYROID STIM HORMONE: CPT

## 2023-08-26 PROCEDURE — 83735 ASSAY OF MAGNESIUM: CPT

## 2023-08-26 PROCEDURE — 97165 OT EVAL LOW COMPLEX 30 MIN: CPT

## 2023-08-26 PROCEDURE — 84540 ASSAY OF URINE/UREA-N: CPT

## 2023-08-26 PROCEDURE — 92523 SPEECH SOUND LANG COMPREHEN: CPT

## 2023-08-26 PROCEDURE — 82746 ASSAY OF FOLIC ACID SERUM: CPT

## 2023-08-26 PROCEDURE — 83550 IRON BINDING TEST: CPT

## 2023-08-26 PROCEDURE — 70450 CT HEAD/BRAIN W/O DYE: CPT

## 2023-08-26 PROCEDURE — 85730 THROMBOPLASTIN TIME PARTIAL: CPT

## 2023-08-26 PROCEDURE — 95700 EEG CONT REC W/VID EEG TECH: CPT

## 2023-08-26 PROCEDURE — 0042T: CPT

## 2023-08-26 PROCEDURE — 84133 ASSAY OF URINE POTASSIUM: CPT

## 2023-08-26 PROCEDURE — 80048 BASIC METABOLIC PNL TOTAL CA: CPT

## 2023-08-26 PROCEDURE — 83540 ASSAY OF IRON: CPT

## 2023-08-26 PROCEDURE — 81001 URINALYSIS AUTO W/SCOPE: CPT

## 2023-08-26 PROCEDURE — 70498 CT ANGIOGRAPHY NECK: CPT

## 2023-08-26 PROCEDURE — 83930 ASSAY OF BLOOD OSMOLALITY: CPT

## 2023-08-26 PROCEDURE — 84300 ASSAY OF URINE SODIUM: CPT

## 2023-08-26 PROCEDURE — 95708 EEG WO VID EA 12-26HR UNMNTR: CPT

## 2023-08-26 PROCEDURE — 80053 COMPREHEN METABOLIC PANEL: CPT

## 2023-08-26 PROCEDURE — 70496 CT ANGIOGRAPHY HEAD: CPT

## 2023-08-26 PROCEDURE — 83615 LACTATE (LD) (LDH) ENZYME: CPT

## 2023-08-26 PROCEDURE — 80175 DRUG SCREEN QUAN LAMOTRIGINE: CPT

## 2023-08-26 PROCEDURE — 83036 HEMOGLOBIN GLYCOSYLATED A1C: CPT

## 2023-08-26 PROCEDURE — 84156 ASSAY OF PROTEIN URINE: CPT

## 2023-08-26 PROCEDURE — 82607 VITAMIN B-12: CPT

## 2023-08-26 PROCEDURE — 80307 DRUG TEST PRSMV CHEM ANLYZR: CPT

## 2023-08-26 PROCEDURE — 85027 COMPLETE CBC AUTOMATED: CPT

## 2023-08-26 PROCEDURE — 83935 ASSAY OF URINE OSMOLALITY: CPT

## 2023-08-26 PROCEDURE — 80061 LIPID PANEL: CPT

## 2023-08-26 PROCEDURE — 82962 GLUCOSE BLOOD TEST: CPT

## 2023-08-26 PROCEDURE — 85025 COMPLETE CBC W/AUTO DIFF WBC: CPT

## 2023-08-26 PROCEDURE — 84100 ASSAY OF PHOSPHORUS: CPT

## 2023-08-26 PROCEDURE — 82728 ASSAY OF FERRITIN: CPT

## 2023-08-26 PROCEDURE — 99291 CRITICAL CARE FIRST HOUR: CPT

## 2023-08-26 PROCEDURE — 97161 PT EVAL LOW COMPLEX 20 MIN: CPT

## 2023-08-26 RX ORDER — ASPIRIN/CALCIUM CARB/MAGNESIUM 324 MG
1 TABLET ORAL
Qty: 30 | Refills: 0
Start: 2023-08-26

## 2023-08-26 RX ORDER — CLOPIDOGREL BISULFATE 75 MG/1
1 TABLET, FILM COATED ORAL
Qty: 30 | Refills: 0
Start: 2023-08-26 | End: 2023-09-24

## 2023-08-26 RX ORDER — VALSARTAN 80 MG/1
1 TABLET ORAL
Qty: 30 | Refills: 0
Start: 2023-08-26 | End: 2023-09-24

## 2023-08-26 RX ADMIN — MORPHINE SULFATE 15 MILLIGRAM(S): 50 CAPSULE, EXTENDED RELEASE ORAL at 11:24

## 2023-08-26 RX ADMIN — Medication 129.6 MILLIGRAM(S): at 08:30

## 2023-08-26 RX ADMIN — BUPROPION HYDROCHLORIDE 300 MILLIGRAM(S): 150 TABLET, EXTENDED RELEASE ORAL at 10:35

## 2023-08-26 RX ADMIN — Medication 80 MILLIGRAM(S): at 10:35

## 2023-08-26 RX ADMIN — LAMOTRIGINE 300 MILLIGRAM(S): 25 TABLET, ORALLY DISINTEGRATING ORAL at 10:36

## 2023-08-26 RX ADMIN — Medication 100 MICROGRAM(S): at 06:54

## 2023-08-26 RX ADMIN — MORPHINE SULFATE 15 MILLIGRAM(S): 50 CAPSULE, EXTENDED RELEASE ORAL at 10:36

## 2023-08-26 NOTE — DISCHARGE NOTE NURSING/CASE MANAGEMENT/SOCIAL WORK - PATIENT PORTAL LINK FT
You can access the FollowMyHealth Patient Portal offered by [facility] by registering at the following website: [email or web address]. By joining [email or web address] [email or web address] portal, you will also be able to view your health information using other applications (apps) compatible with our system.

## 2023-08-26 NOTE — DISCHARGE NOTE NURSING/CASE MANAGEMENT/SOCIAL WORK - NSDCFUADDAPPT_GEN_ALL_CORE_FT
Follow up in stroke clinic with RANDOLPH Dick.     Please call [phone] to schedule an appointment with our cardiothoracic team for follow up of dilated aorta seen on echo.

## 2023-08-26 NOTE — DISCHARGE NOTE NURSING/CASE MANAGEMENT/SOCIAL WORK - NSDCPEFALRISK_GEN_ALL_CORE
Patient returned to clinic today for removal of  Glucose Sensor and signed patient log form used in CM[facility] procedure.    The CGMS Sensor was  scanned and downloaded. All reports were imported into the patient's electronic medical record.    Physician Assistant, Mansi Swain, will complete data interpretation and make recommendations.   
For information on Fall & Injury Prevention, visit: [email or web address] OR  [email or web address] OR  https://www.cdc.gov/steadi/patient.html

## 2023-08-28 LAB — LAMOTRIGINE SERPL-MCNC: 2.5 UG/ML — SIGNIFICANT CHANGE UP (ref 2–20)

## 2023-08-30 DIAGNOSIS — I63.9 CEREBRAL INFARCTION, UNSPECIFIED: ICD-10-CM

## 2023-08-30 DIAGNOSIS — Z53.09 PROCEDURE AND TREATMENT NOT CARRIED OUT BECAUSE OF OTHER CONTRAINDICATION: ICD-10-CM

## 2023-08-30 DIAGNOSIS — Z92.82 STATUS POST ADMINISTRATION OF TPA (RTPA) IN A DIFFERENT FACILITY WITHIN THE LAST 24 HOURS PRIOR TO ADMISSION TO CURRENT FACILITY: ICD-10-CM

## 2023-08-30 DIAGNOSIS — R29.702 NIHSS SCORE 2: ICD-10-CM

## 2023-08-30 DIAGNOSIS — G40.909 EPILEPSY, UNSPECIFIED, NOT INTRACTABLE, WITHOUT STATUS EPILEPTICUS: ICD-10-CM

## 2023-08-30 DIAGNOSIS — F11.20 OPIOID DEPENDENCE, UNCOMPLICATED: ICD-10-CM

## 2023-08-30 DIAGNOSIS — Z88.0 ALLERGY STATUS TO PENICILLIN: ICD-10-CM

## 2023-08-30 DIAGNOSIS — E87.1 HYPO-OSMOLALITY AND HYPONATREMIA: ICD-10-CM

## 2023-08-30 DIAGNOSIS — R47.01 APHASIA: ICD-10-CM

## 2023-08-30 DIAGNOSIS — E78.5 HYPERLIPIDEMIA, UNSPECIFIED: ICD-10-CM

## 2023-08-30 DIAGNOSIS — D64.9 ANEMIA, UNSPECIFIED: ICD-10-CM

## 2023-08-30 DIAGNOSIS — I10 ESSENTIAL (PRIMARY) HYPERTENSION: ICD-10-CM

## 2023-09-18 ENCOUNTER — NON-APPOINTMENT (OUTPATIENT)
Age: 68
End: 2023-09-18

## 2023-09-18 ENCOUNTER — APPOINTMENT (OUTPATIENT)
Dept: HEART AND VASCULAR | Facility: CLINIC | Age: 68
End: 2023-09-18
Payer: MEDICARE

## 2023-09-18 VITALS
DIASTOLIC BLOOD PRESSURE: 68 MMHG | HEIGHT: 64 IN | SYSTOLIC BLOOD PRESSURE: 140 MMHG | HEART RATE: 70 BPM | TEMPERATURE: 97 F | WEIGHT: 134 LBS | BODY MASS INDEX: 22.88 KG/M2

## 2023-09-18 PROCEDURE — 93285 PRGRMG DEV EVAL SCRMS IP: CPT

## 2023-09-23 ENCOUNTER — EMERGENCY (EMERGENCY)
Facility: HOSPITAL | Age: 68
LOS: 1 days | Discharge: HOME | End: 2023-09-23
Attending: EMERGENCY MEDICINE | Admitting: EMERGENCY MEDICINE
Payer: MEDICARE

## 2023-09-23 VITALS
DIASTOLIC BLOOD PRESSURE: 70 MMHG | WEIGHT: 134.04 LBS | HEART RATE: 74 BPM | OXYGEN SATURATION: 99 % | TEMPERATURE: 100 F | SYSTOLIC BLOOD PRESSURE: 145 MMHG | HEIGHT: 64 IN | RESPIRATION RATE: 18 BRPM

## 2023-09-23 PROCEDURE — 99284 EMERGENCY DEPT VISIT MOD MDM: CPT

## 2023-09-23 PROCEDURE — 73030 X-RAY EXAM OF SHOULDER: CPT | Mod: 26,RT

## 2023-09-23 NOTE — ED PROVIDER NOTE - CLINICAL SUMMARY MEDICAL DECISION MAKING FREE TEXT BOX
pt presents for R shoulder pain for several weeks, worse w movement particularly abduction. no deformity, no recent fall. Had a fall earlier in the year onto his buttocks. no neuro deficits, no numbness or paresthesias. Will get xrays to r/o bony abnormality and give imaging to use for f/u set up with ortho for this next monday.

## 2023-09-23 NOTE — ED ADULT NURSE NOTE - NSFALLHARMRISKINTERV_ED_ALL_ED

## 2023-09-23 NOTE — ED PROVIDER NOTE - PATIENT PORTAL LINK FT
You can access the FollowMyHealth Patient Portal offered by [facility] by registering at the following website: [email or web address]. By joining [email or web address]’s FollowMyHealth portal, you will also be able to view your health information using other applications (apps) compatible with our system.

## 2023-09-23 NOTE — ED ADULT TRIAGE NOTE - CHIEF COMPLAINT QUOTE
c/o right shoulder pain s/p mechanical fall- pt c/o pain on movement - states he is unable to lift arm above his head

## 2023-09-23 NOTE — ED PROVIDER NOTE - WR ORDER NAME 1
Xray Shoulder 2 Views, Right Eyes with no visual disturbances.  Ears clean and dry and no hearing difficulties. Nose with pink mucosa and no drainage.  Mouth mucous membranes moist and pink.  No tenderness or swelling to throat or neck.
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
## 2023-09-26 DIAGNOSIS — M25.511 PAIN IN RIGHT SHOULDER: ICD-10-CM

## 2023-09-26 DIAGNOSIS — Z79.82 LONG TERM (CURRENT) USE OF ASPIRIN: ICD-10-CM
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
## 2023-09-26 DIAGNOSIS — Z88.0 ALLERGY STATUS TO PENICILLIN: ICD-10-CM

## 2023-10-19 ENCOUNTER — NON-APPOINTMENT (OUTPATIENT)
Age: 68
End: 2023-10-19

## 2023-10-19 ENCOUNTER — APPOINTMENT (OUTPATIENT)
Dept: HEART AND VASCULAR | Facility: CLINIC | Age: 68
End: 2023-10-19
Payer: MEDICARE

## 2023-10-20 PROCEDURE — G2066: CPT

## 2023-10-20 PROCEDURE — 93298 REM INTERROG DEV EVAL SCRMS: CPT

## 2023-11-22 ENCOUNTER — APPOINTMENT (OUTPATIENT)
Dept: HEART AND VASCULAR | Facility: CLINIC | Age: 68
End: 2023-11-22
Payer: MEDICARE

## 2023-11-22 ENCOUNTER — NON-APPOINTMENT (OUTPATIENT)
Age: 68
End: 2023-11-22

## 2023-11-23 PROCEDURE — 93298 REM INTERROG DEV EVAL SCRMS: CPT

## 2023-11-23 PROCEDURE — G2066: CPT

## 2023-11-26 ENCOUNTER — NON-APPOINTMENT (OUTPATIENT)
Age: 68
End: 2023-11-26

## 2023-12-27 ENCOUNTER — NON-APPOINTMENT (OUTPATIENT)
Age: 68
End: 2023-12-27

## 2023-12-27 ENCOUNTER — APPOINTMENT (OUTPATIENT)
Dept: HEART AND VASCULAR | Facility: CLINIC | Age: 68
End: 2023-12-27
Payer: MEDICARE

## 2023-12-28 PROCEDURE — G2066: CPT

## 2023-12-28 PROCEDURE — 93298 REM INTERROG DEV EVAL SCRMS: CPT

## 2024-01-31 ENCOUNTER — APPOINTMENT (OUTPATIENT)
Dept: HEART AND VASCULAR | Facility: CLINIC | Age: 69
End: 2024-01-31
Payer: MEDICARE

## 2024-01-31 ENCOUNTER — NON-APPOINTMENT (OUTPATIENT)
Age: 69
End: 2024-01-31

## 2024-02-01 PROCEDURE — 93298 REM INTERROG DEV EVAL SCRMS: CPT

## 2024-04-19 ENCOUNTER — APPOINTMENT (OUTPATIENT)
Dept: HEART AND VASCULAR | Facility: CLINIC | Age: 69
End: 2024-04-19
Payer: MEDICARE

## 2024-04-19 ENCOUNTER — NON-APPOINTMENT (OUTPATIENT)
Age: 69
End: 2024-04-19

## 2024-04-19 PROCEDURE — 93298 REM INTERROG DEV EVAL SCRMS: CPT

## 2024-04-21 ENCOUNTER — NON-APPOINTMENT (OUTPATIENT)
Age: 69
End: 2024-04-21

## 2024-04-25 ENCOUNTER — INPATIENT (INPATIENT)
Facility: HOSPITAL | Age: 69
LOS: 1 days | Discharge: HOME | End: 2024-04-27
Attending: PSYCHIATRY & NEUROLOGY | Admitting: PSYCHIATRY & NEUROLOGY
Payer: MEDICARE

## 2024-04-25 VITALS
HEART RATE: 75 BPM | OXYGEN SATURATION: 99 % | SYSTOLIC BLOOD PRESSURE: 122 MMHG | TEMPERATURE: 99 F | RESPIRATION RATE: 19 BRPM | WEIGHT: 135.8 LBS | DIASTOLIC BLOOD PRESSURE: 76 MMHG

## 2024-04-25 LAB
ALBUMIN SERPL ELPH-MCNC: 4.1 G/DL — SIGNIFICANT CHANGE UP (ref 3.4–5)
ALP SERPL-CCNC: 121 U/L — HIGH (ref 40–120)
ALT FLD-CCNC: 29 U/L — SIGNIFICANT CHANGE UP (ref 12–42)
ANION GAP SERPL CALC-SCNC: 11 MMOL/L — SIGNIFICANT CHANGE UP (ref 9–16)
APTT BLD: 28.9 SEC — SIGNIFICANT CHANGE UP (ref 24.5–35.6)
AST SERPL-CCNC: 22 U/L — SIGNIFICANT CHANGE UP (ref 15–37)
BASOPHILS # BLD AUTO: 0.06 K/UL — SIGNIFICANT CHANGE UP (ref 0–0.2)
BASOPHILS NFR BLD AUTO: 0.6 % — SIGNIFICANT CHANGE UP (ref 0–2)
BILIRUB SERPL-MCNC: 0.3 MG/DL — SIGNIFICANT CHANGE UP (ref 0.2–1.2)
BUN SERPL-MCNC: 13 MG/DL — SIGNIFICANT CHANGE UP (ref 7–23)
CALCIUM SERPL-MCNC: 8.6 MG/DL — SIGNIFICANT CHANGE UP (ref 8.5–10.5)
CHLORIDE SERPL-SCNC: 90 MMOL/L — LOW (ref 96–108)
CO2 SERPL-SCNC: 26 MMOL/L — SIGNIFICANT CHANGE UP (ref 22–31)
CREAT SERPL-MCNC: 1.13 MG/DL — SIGNIFICANT CHANGE UP (ref 0.5–1.3)
EGFR: 70 ML/MIN/1.73M2 — SIGNIFICANT CHANGE UP
EOSINOPHIL # BLD AUTO: 0.05 K/UL — SIGNIFICANT CHANGE UP (ref 0–0.5)
EOSINOPHIL NFR BLD AUTO: 0.5 % — SIGNIFICANT CHANGE UP (ref 0–6)
GLUCOSE SERPL-MCNC: 100 MG/DL — HIGH (ref 70–99)
HCT VFR BLD CALC: 37.8 % — LOW (ref 39–50)
HGB BLD-MCNC: 12.7 G/DL — LOW (ref 13–17)
IMM GRANULOCYTES NFR BLD AUTO: 0.6 % — SIGNIFICANT CHANGE UP (ref 0–0.9)
INR BLD: 1.05 — SIGNIFICANT CHANGE UP (ref 0.85–1.18)
LYMPHOCYTES # BLD AUTO: 1.18 K/UL — SIGNIFICANT CHANGE UP (ref 1–3.3)
LYMPHOCYTES # BLD AUTO: 10.9 % — LOW (ref 13–44)
MCHC RBC-ENTMCNC: 29.3 PG — SIGNIFICANT CHANGE UP (ref 27–34)
MCHC RBC-ENTMCNC: 33.6 GM/DL — SIGNIFICANT CHANGE UP (ref 32–36)
MCV RBC AUTO: 87.3 FL — SIGNIFICANT CHANGE UP (ref 80–100)
MONOCYTES # BLD AUTO: 1.05 K/UL — HIGH (ref 0–0.9)
MONOCYTES NFR BLD AUTO: 9.7 % — SIGNIFICANT CHANGE UP (ref 2–14)
NEUTROPHILS # BLD AUTO: 8.41 K/UL — HIGH (ref 1.8–7.4)
NEUTROPHILS NFR BLD AUTO: 77.7 % — HIGH (ref 43–77)
NRBC # BLD: 0 /100 WBCS — SIGNIFICANT CHANGE UP (ref 0–0)
PLATELET # BLD AUTO: 293 K/UL — SIGNIFICANT CHANGE UP (ref 150–400)
POTASSIUM SERPL-MCNC: 4.3 MMOL/L — SIGNIFICANT CHANGE UP (ref 3.5–5.3)
POTASSIUM SERPL-SCNC: 4.3 MMOL/L — SIGNIFICANT CHANGE UP (ref 3.5–5.3)
PROT SERPL-MCNC: 7.3 G/DL — SIGNIFICANT CHANGE UP (ref 6.4–8.2)
PROTHROM AB SERPL-ACNC: 11.9 SEC — SIGNIFICANT CHANGE UP (ref 9.5–13)
RBC # BLD: 4.33 M/UL — SIGNIFICANT CHANGE UP (ref 4.2–5.8)
RBC # FLD: 12.1 % — SIGNIFICANT CHANGE UP (ref 10.3–14.5)
SODIUM SERPL-SCNC: 127 MMOL/L — LOW (ref 132–145)
TROPONIN I, HIGH SENSITIVITY RESULT: 9.7 NG/L — SIGNIFICANT CHANGE UP
WBC # BLD: 10.81 K/UL — HIGH (ref 3.8–10.5)
WBC # FLD AUTO: 10.81 K/UL — HIGH (ref 3.8–10.5)

## 2024-04-25 PROCEDURE — 70496 CT ANGIOGRAPHY HEAD: CPT | Mod: 26,MC

## 2024-04-25 PROCEDURE — 70498 CT ANGIOGRAPHY NECK: CPT | Mod: 26,MC

## 2024-04-25 PROCEDURE — 0042T: CPT | Mod: MC

## 2024-04-25 PROCEDURE — 99291 CRITICAL CARE FIRST HOUR: CPT

## 2024-04-25 RX ORDER — ASPIRIN/CALCIUM CARB/MAGNESIUM 324 MG
81 TABLET ORAL DAILY
Refills: 0 | Status: DISCONTINUED | OUTPATIENT
Start: 2024-04-25 | End: 2024-04-27

## 2024-04-25 RX ORDER — ENOXAPARIN SODIUM 100 MG/ML
40 INJECTION SUBCUTANEOUS EVERY 24 HOURS
Refills: 0 | Status: DISCONTINUED | OUTPATIENT
Start: 2024-04-25 | End: 2024-04-27

## 2024-04-25 RX ORDER — LAMOTRIGINE 25 MG/1
3 TABLET, ORALLY DISINTEGRATING ORAL
Refills: 0 | DISCHARGE

## 2024-04-25 RX ORDER — PHENOBARBITAL 60 MG
129.6 TABLET ORAL
Refills: 0 | Status: DISCONTINUED | OUTPATIENT
Start: 2024-04-25 | End: 2024-04-27

## 2024-04-25 RX ORDER — TRAZODONE HCL 50 MG
25 TABLET ORAL AT BEDTIME
Refills: 0 | Status: DISCONTINUED | OUTPATIENT
Start: 2024-04-25 | End: 2024-04-27

## 2024-04-25 RX ORDER — PHENOBARBITAL 60 MG
2 TABLET ORAL
Refills: 0 | DISCHARGE

## 2024-04-25 RX ORDER — ASPIRIN/CALCIUM CARB/MAGNESIUM 324 MG
81 TABLET ORAL ONCE
Refills: 0 | Status: COMPLETED | OUTPATIENT
Start: 2024-04-25 | End: 2024-04-25

## 2024-04-25 RX ORDER — LAMOTRIGINE 25 MG/1
300 TABLET, ORALLY DISINTEGRATING ORAL
Refills: 0 | Status: DISCONTINUED | OUTPATIENT
Start: 2024-04-25 | End: 2024-04-25

## 2024-04-25 RX ORDER — ATORVASTATIN CALCIUM 80 MG/1
80 TABLET, FILM COATED ORAL AT BEDTIME
Refills: 0 | Status: DISCONTINUED | OUTPATIENT
Start: 2024-04-25 | End: 2024-04-27

## 2024-04-25 RX ORDER — CLOPIDOGREL BISULFATE 75 MG/1
75 TABLET, FILM COATED ORAL DAILY
Refills: 0 | Status: DISCONTINUED | OUTPATIENT
Start: 2024-04-25 | End: 2024-04-27

## 2024-04-25 RX ORDER — TRAZODONE HCL 50 MG
1 TABLET ORAL
Refills: 0 | DISCHARGE

## 2024-04-25 RX ORDER — LEVOTHYROXINE SODIUM 125 MCG
100 TABLET ORAL
Refills: 0 | Status: DISCONTINUED | OUTPATIENT
Start: 2024-04-26 | End: 2024-04-27

## 2024-04-25 RX ORDER — LAMOTRIGINE 25 MG/1
300 TABLET, ORALLY DISINTEGRATING ORAL EVERY 24 HOURS
Refills: 0 | Status: DISCONTINUED | OUTPATIENT
Start: 2024-04-26 | End: 2024-04-27

## 2024-04-25 RX ORDER — BUPROPION HYDROCHLORIDE 150 MG/1
1 TABLET, EXTENDED RELEASE ORAL
Refills: 0 | DISCHARGE

## 2024-04-25 RX ORDER — BUPROPION HYDROCHLORIDE 150 MG/1
300 TABLET, EXTENDED RELEASE ORAL DAILY
Refills: 0 | Status: DISCONTINUED | OUTPATIENT
Start: 2024-04-26 | End: 2024-04-27

## 2024-04-25 RX ORDER — LAMOTRIGINE 25 MG/1
200 TABLET, ORALLY DISINTEGRATING ORAL EVERY 24 HOURS
Refills: 0 | Status: DISCONTINUED | OUTPATIENT
Start: 2024-04-25 | End: 2024-04-27

## 2024-04-25 RX ORDER — FLUOXETINE HCL 10 MG
2 CAPSULE ORAL
Refills: 0 | DISCHARGE

## 2024-04-25 RX ORDER — FLUOXETINE HCL 10 MG
40 CAPSULE ORAL DAILY
Refills: 0 | Status: DISCONTINUED | OUTPATIENT
Start: 2024-04-26 | End: 2024-04-27

## 2024-04-25 RX ORDER — LEVOTHYROXINE SODIUM 125 MCG
1 TABLET ORAL
Refills: 0 | DISCHARGE

## 2024-04-25 RX ORDER — MELOXICAM 15 MG/1
1 TABLET ORAL
Refills: 0 | DISCHARGE

## 2024-04-25 RX ORDER — ATORVASTATIN CALCIUM 80 MG/1
1 TABLET, FILM COATED ORAL
Refills: 0 | DISCHARGE

## 2024-04-25 RX ORDER — IRBESARTAN 75 MG/1
1 TABLET ORAL
Refills: 0 | DISCHARGE

## 2024-04-25 RX ORDER — PHENOBARBITAL 60 MG
64.8 TABLET ORAL
Refills: 0 | Status: DISCONTINUED | OUTPATIENT
Start: 2024-04-25 | End: 2024-04-25

## 2024-04-25 RX ORDER — MORPHINE SULFATE 50 MG/1
1 CAPSULE, EXTENDED RELEASE ORAL
Refills: 0 | DISCHARGE

## 2024-04-25 RX ADMIN — ENOXAPARIN SODIUM 40 MILLIGRAM(S): 100 INJECTION SUBCUTANEOUS at 22:05

## 2024-04-25 RX ADMIN — LAMOTRIGINE 200 MILLIGRAM(S): 25 TABLET, ORALLY DISINTEGRATING ORAL at 23:30

## 2024-04-25 RX ADMIN — Medication 129.6 MILLIGRAM(S): at 23:32

## 2024-04-25 RX ADMIN — CLOPIDOGREL BISULFATE 75 MILLIGRAM(S): 75 TABLET, FILM COATED ORAL at 14:59

## 2024-04-25 RX ADMIN — Medication 81 MILLIGRAM(S): at 14:59

## 2024-04-25 RX ADMIN — Medication 25 MILLIGRAM(S): at 23:30

## 2024-04-25 RX ADMIN — ATORVASTATIN CALCIUM 80 MILLIGRAM(S): 80 TABLET, FILM COATED ORAL at 22:05

## 2024-04-25 NOTE — ED PROVIDER NOTE - OBJECTIVE STATEMENT
Patient brought in by ambulance For weakness.  Patient reports that he had a hard time sleeping last night due to receiving some bad news about his wife's medical condition.  He woke up at 3 in the morning and took a trazodone.  Felt normal at that time and had a steady gait.  Woke up this morning, stood up, both legs gave out.  He caught himself with his arms.  Did not hit his head.  Reports that his been having unsteadiness of gait since he woke up this morning.  No paresthesias.  Had a stroke in the past that was aborted with tPA but left him with some memory problems.  At that time he said he was having speech difficulties.  No speech difficulties today Patient brought in by ambulance For weakness.  Patient reports that he had a hard time sleeping last night due to receiving some bad news about his wife's medical condition.  He woke up at 3 in the morning and took a trazodone.  Felt normal at that time and had a steady gait.  Woke up this morning, stood up, both legs gave out.  He caught himself with his arms.  Did not hit his head.  Reports that his been having unsteadiness of gait since he woke up this morning.  No paresthesias.  Had a stroke in 08/2023 that was aborted with TNK but left him with some memory problems.  At that time he said he was having speech difficulties.  No speech difficulties today

## 2024-04-25 NOTE — H&P ADULT - HISTORY OF PRESENT ILLNESS
Last known well time/Time of onset of symptoms: 6:30AM    HPI: 68y Male with PMHx of epilepsy on Phenobarbital, HTN, HLD, bipolar disorder on lamictal, CVA in 8/2023 (presented with mixed aphasia s/p TNK, negative MRI post TNK), coming in with difficulty walking. Pt states that yesterday he was very worried because his wife had a CT scan and there was a suspicious nodule. He couldn't sleep at night and had forgotten to take his nighttime meds. He said he then remembered to take his meds at 3 am, so took all of them including trazodone. He woke up at his usual time at 6:30am, felt relatively normal then and made breakfast. He states a little after 8AM he felt wobbly, like his balance was off. He decided to leave and go to the office anyway, and when he got there he felt his "wobbliness" got worse and he fell (but caught himself). He then went to [facility], and then was sent to [place].  He states he still feels wobbly when he walks. He notes that he had a pelvic fracture so his left leg is a little more wobbly than his right at baseline.     T(C): 36.7 (04-25-24 @ 21:59), Max: 37 (04-25-24 @ 13:09)  HR: 65 (04-25-24 @ 20:05) (65 - 75)  BP: 128/69 (04-25-24 @ 20:05) (122/76 - 160/85)  RR: 17 (04-25-24 @ 20:05) (17 - 19)  SpO2: 98% (04-25-24 @ 20:05) (96% - 99%)    PAST MEDICAL & SURGICAL HISTORY:  Prostate cancer      Cerebrovascular accident     Last known well time/Time of onset of symptoms: 6:30AM    HPI: 68y Male with PMHx of epilepsy on Phenobarbital, HTN, HLD, bipolar disorder on lamictal, CVA in 8/2023 (presented with mixed aphasia s/p TNK, negative MRI post TNK), coming in with difficulty walking. Pt states that yesterday he was very worried because his wife had a CT scan and there was a suspicious nodule. He couldn't sleep at night and had forgotten to take his nighttime meds. He said he then remembered to take his meds at 3 am, so took all of them including trazodone. He woke up at his usual time at 6:30am, felt relatively normal then and made breakfast. He states a little after 8AM he felt wobbly, like his balance was off. He decided to leave and go to the office anyway, and when he got there he felt his "wobbliness" got worse and he fell (but caught himself). He then went to [facility], and then was transferred to [place].  He states he still feels wobbly when he walks. He notes that he had a pelvic fracture so his left leg is a little more wobbly than his right at baseline.     T(C): 36.7 (04-25-24 @ 21:59), Max: 37 (04-25-24 @ 13:09)  HR: 65 (04-25-24 @ 20:05) (65 - 75)  BP: 128/69 (04-25-24 @ 20:05) (122/76 - 160/85)  RR: 17 (04-25-24 @ 20:05) (17 - 19)  SpO2: 98% (04-25-24 @ 20:05) (96% - 99%)    PAST MEDICAL & SURGICAL HISTORY:  Prostate cancer      Cerebrovascular accident

## 2024-04-25 NOTE — ED PROVIDER NOTE - PHYSICAL EXAMINATION
Gen: Well-developed, well-nourished, NAD, VS as noted by nursing. HEENT: NCAT, mmm   Chest: RRR, nl S1 and S2, no m/r/g. Resp: CTAB, no w/r/r  Abd: nl BS, soft, nt/nd. Ext: Warm, dry  Neuro: CN II-XII intact, normal and equal strength, sensation, and reflexes bilaterally, speech clear. past-pointing on left arm, difficulty with heel-shin on left leg.  Psych: AAOx3

## 2024-04-25 NOTE — ED PROVIDER NOTE - PROGRESS NOTE DETAILS
Problem: Falls - Risk of:  Goal: Will remain free from falls  Description: Will remain free from falls  Outcome: Ongoing  Goal: Absence of physical injury  Description: Absence of physical injury  Outcome: Ongoing     Problem: Airway Clearance - Ineffective  Goal: Achieve or maintain patent airway  Outcome: Ongoing     Problem: Gas Exchange - Impaired  Goal: Absence of hypoxia  Outcome: Ongoing  Goal: Promote optimal lung function  Outcome: Ongoing     Problem: Breathing Pattern - Ineffective  Goal: Ability to achieve and maintain a regular respiratory rate  Outcome: Ongoing     Problem:  Body Temperature -  Risk of, Imbalanced  Goal: Ability to maintain a body temperature within defined limits  Outcome: Ongoing  Goal: Will regain or maintain usual level of consciousness  Outcome: Ongoing  Goal: Complications related to the disease process, condition or treatment will be avoided or minimized  Outcome: Ongoing     Problem: Isolation Precautions - Risk of Spread of Infection  Goal: Prevent transmission of infection  Outcome: Ongoing     Problem: Nutrition Deficits  Goal: Optimize nutritional status  Outcome: Ongoing     Problem: Risk for Fluid Volume Deficit  Goal: Maintain normal heart rhythm  Outcome: Ongoing  Goal: Maintain absence of muscle cramping  Outcome: Ongoing  Goal: Maintain normal serum potassium, sodium, calcium, phosphorus, and pH  Outcome: Ongoing     Problem: Loneliness or Risk for Loneliness  Goal: Demonstrate positive use of time alone when socialization is not possible  Outcome: Ongoing     Problem: Fatigue  Goal: Verbalize increase energy and improved vitality  Outcome: Ongoing     Problem: Patient Education: Go to Patient Education Activity  Goal: Patient/Family Education  Outcome: Ongoing Called by radiology attending. CT head non-contrast negative. Called [facility], consulted with tele-stroke attending Dr. Aly. He accepted patient to stroke unit at [facility] under Dr. Ge. Patient not a TNK candidate as last know well 4.5 hours ago. Spoke to patient's wife via telephone. She said patient was normal when she left home at 8am today. Patient reports symptoms started shortly after his wife left. This changes LKW to 8am, but patient is still outside the window for TNK.

## 2024-04-25 NOTE — PATIENT PROFILE ADULT - FALL HARM RISK - HARM RISK INTERVENTIONS
Assistance with ambulation/Assistance OOB with selected safe patient handling equipment/Communicate Risk of Fall with Harm to all staff/Discuss with provider need for PT consult/Monitor gait and stability/Provide patient with walking aids - walker, cane, crutches/Reinforce activity limits and safety measures with patient and family/Sit up slowly, dangle for a short time, stand at bedside before walking/Tailored Fall Risk Interventions/Visual Cue: Yellow wristband and red socks/Bed in lowest position, wheels locked, appropriate side rails in place/Call bell, personal items and telephone in reach/Instruct patient to call for assistance before getting out of bed or chair/Non-slip footwear when patient is out of bed/[place] to call system/Physically safe environment - no spills, clutter or unnecessary equipment/Purposeful Proactive Rounding/Room/bathroom lighting operational, light cord in reach

## 2024-04-25 NOTE — ED ADULT NURSE NOTE - OBJECTIVE STATEMENT
Pt BIBA for dizziness and loss of balance that began this morning. Pt reports waking up earlier this morning at 3am to take trazodone and having steady gait at that time. pt woke up at a later time and experienced a loss of balance while attempting to get up out of bed; pt reports catching himself with his arms and denies head strike or any other injuries. PMH of stroke in February 2024 for which the pt was given tPA; pt reports having memory problems as residual deficit. Pt BIBA for dizziness and loss of balance that began this morning. Pt reports waking up earlier this morning at 3am to take trazodone and having steady gait at that time. pt woke up at a later time and experienced a loss of balance while attempting to get up out of bed; pt reports catching himself with his arms and denies head strike or any other injuries. PMH of stroke in February 2024 for which the pt was given tPA; pt reports having memory problems as residual deficit. Pt prescribed 81mg ASA daily.

## 2024-04-25 NOTE — H&P ADULT - NSHPREVIEWOFSYSTEMS_GEN_ALL_CORE
ROS: ***  Constitutional: No fever, weight loss or fatigue  Eyes: No eye pain, visual disturbances, or discharge  ENMT:  No difficulty hearing, tinnitus; No sinus or throat pain  Neck: No pain or stiffness  Respiratory: No cough, wheezing, chills or hemoptysis  Cardiovascular: No chest pain, palpitations, shortness of breath, or leg swelling  Gastrointestinal: No abdominal pain. No nausea, vomiting or hematemesis; No diarrhea or constipation. Nohematochezia.  Genitourinary: No dysuria, frequency, hematuria or incontinence  Neurological: As per HPI  Skin: No itching, burning, rashes or lesions   Endocrine: No heat or cold intolerance; No hair loss  Musculoskeletal: No joint pain or swelling; No muscle, back or extremity pain  Heme/Lymph: No easy bruising or bleeding gums

## 2024-04-25 NOTE — ED ADULT NURSE NOTE - NSFALLHARMRISKINTERV_ED_ALL_ED
Assistance OOB with selected safe patient handling equipment if applicable/Assistance with ambulation/Communicate risk of Fall with Harm to all staff, patient, and family/Monitor gait and stability/Provide visual cue: red socks, yellow wristband, yellow gown, etc/Reinforce activity limits and safety measures with patient and family/Bed in lowest position, wheels locked, appropriate side rails in place/Call bell, personal items and telephone in reach/Instruct patient to call for assistance before getting out of bed/chair/stretcher/Non-slip footwear applied when patient is off stretcher/[place] to call system/Physically safe environment - no spills, clutter or unnecessary equipment/Purposeful Proactive Rounding/Room/bathroom lighting operational, light cord in reach

## 2024-04-25 NOTE — ED ADULT TRIAGE NOTE - TEMPERATURE IN CELSIUS (DEGREES C)
37
Calculus of kidney    Cervical radiculopathy  no limitations in ROM as per pt , no pain after meds , no arm numbnes  COVID-19 virus infection  04/16/2020  Depression  hx of  H/O cholecystitis    H/O fracture of ankle  right  History of gastroesophageal reflux (GERD)    History of umbilical hernia    HLD (hyperlipidemia)    Injury of left ring finger  hx of lower back pain  Lumbar radiculopathy

## 2024-04-25 NOTE — ED ADULT NURSE NOTE - CHIEF COMPLAINT QUOTE
pt BIBA from work  for a fall & c/o dizziness and lower extremity weakness. Pt states hx of stroke February 2024.

## 2024-04-25 NOTE — H&P ADULT - ASSESSMENT
68y Male with PMHx of epilepsy on Phenobarbital, HTN, HLD, bipolar disorder on lamictal, CVA in 8/2023 (presented with mixed aphasia s/p TNK, negative MRI post TNK), coming in with difficulty walking. CT scan negative for stroke, cTA with no stenosis. CTP with mismatch in the right anterior temporal lobe and left frontal lobe, though limited by motion. Exam significant for left sided dysmetria. Admitted for stroke workup.    Neuro  #CVA workup  - continue aspirin 81mg and plavix 75mg daily  - continue atorvastatin 80mg daily  - q4hr stroke neuro checks and vitals  - obtain MRI Brain without contrast  - Stroke Code HCT Results: neg  - Stroke Code CTA Results: neg  - Stroke education    #epilepsy  -continue home phenobarbital (verify dose with pharmacy in the AM)    Cards  #HTN  - Goal -180  - hold home blood pressure medication for now  - obtain TTE     #HLD  - high dose statin as above in CVA  - LDL results: pending    Pulm  - call provider if SPO2 < 94%    GI  #Nutrition/Fluids/Electrolytes   - replete K<4 and Mg <2  - Diet: DASH      Infectious Disease  - Stroke Code CXR results:     Psych  #bipolar disorder  -continue home lamictal  -continue home fluoxetine  -holding home trazodone for now    Endocrine  #DM  - A1C results: pending    - TSH results: pending  -continue home synthroid (verify dose with pharmacy as pt says he also takes 120 mcg at night)    DVT Prophylaxis  - lovenox sq for DVT prophylaxis   - SCDs for DVT prophylaxis     Pt to be discussed during rounds with Dr. Luma PROCTOR Goals: Goals reviewed at interdisciplinary rounds with case management, social work, physical therapy, occupational therapy, and speech language pathology.      Discussed daily hospital plans and goals with patient  68y Male with PMHx of epilepsy on Phenobarbital, HTN, HLD, bipolar disorder on lamictal, CVA in 8/2023 (presented with mixed aphasia s/p TNK, negative MRI post TNK), coming in with difficulty walking. CT scan negative for stroke, cTA with no stenosis. CTP with mismatch in the right anterior temporal lobe and left frontal lobe, though limited by motion. Exam significant for left sided dysmetria. Admitted for stroke workup.    Neuro  #CVA workup  - continue aspirin 81mg and plavix 75mg daily  - continue atorvastatin 80mg daily  - q4hr stroke neuro checks and vitals  - obtain MRI Brain without contrast  - Stroke Code HCT Results: neg  - Stroke Code CTA Results: neg  - Stroke education    #epilepsy  -continue home phenobarbital    Cards  #HTN  - Goal -180  - hold home blood pressure medication for now  - obtain TTE     #HLD  - high dose statin as above in CVA  - LDL results: pending    Pulm  - call provider if SPO2 < 94%    GI  #Nutrition/Fluids/Electrolytes   - replete K<4 and Mg <2  - Diet: DASH      Infectious Disease  - Stroke Code CXR results:     Psych  #bipolar disorder  -continue home lamictal  -continue home fluoxetine  -holding home trazodone for now    Endocrine  #DM  - A1C results: pending    - TSH results: pending  -continue home synthroid    DVT Prophylaxis  - lovenox sq for DVT prophylaxis   - SCDs for DVT prophylaxis     Pt to be discussed during rounds with Dr. Luma PROCTOR Goals: Goals reviewed at interdisciplinary rounds with case management, social work, physical therapy, occupational therapy, and speech language pathology.      Discussed daily hospital plans and goals with patient  68y Male with PMHx of epilepsy on Phenobarbital, HTN, HLD, bipolar disorder on lamictal, CVA in 8/2023 (presented with mixed aphasia s/p TNK, negative MRI post TNK), coming in with difficulty walking. CT scan negative for stroke, cTA with no stenosis. CTP with mismatch in the right anterior temporal lobe and left frontal lobe, though limited by motion. Exam significant for left sided dysmetria. Admitted for stroke workup.    Neuro  #CVA workup  - continue aspirin 81mg and plavix 75mg daily  - continue atorvastatin 80mg daily  - q4hr stroke neuro checks and vitals  - obtain MRI Brain without contrast  - Stroke Code HCT Results: neg  - Stroke Code CTA Results: neg  - Stroke education    #epilepsy  -continue home phenobarbital    Cards  #HTN  - Goal -180  - hold home blood pressure medication for now  - obtain TTE     #HLD  - high dose statin as above in CVA  - LDL results: pending    Pulm  - call provider if SPO2 < 94%    GI  #Nutrition/Fluids/Electrolytes   - replete K<4 and Mg <2  - Diet: DASH    #hyponatremia (Na 127 on admission) - pt did say he drank a lot of water today  -Na noted to be between 128-131 on last admission  -follow up AM sodium     Infectious Disease  - Stroke Code CXR results:     Psych  #bipolar disorder  -continue home lamictal  -continue home fluoxetine  -holding home trazodone for now    Endocrine  #DM  - A1C results: pending    - TSH results: pending  -continue home synthroid    DVT Prophylaxis  - lovenox sq for DVT prophylaxis   - SCDs for DVT prophylaxis     Pt to be discussed during rounds with Dr. Luma PROCTOR Goals: Goals reviewed at interdisciplinary rounds with case management, social work, physical therapy, occupational therapy, and speech language pathology.      Discussed daily hospital plans and goals with patient

## 2024-04-25 NOTE — ED ADULT TRIAGE NOTE - CHIEF COMPLAINT QUOTE
pt BIBA from work c/o dizziness and lower extremity weakness. Pt states hx of stroke February 2024. pt BIBA from work  for a fall & c/o dizziness and lower extremity weakness. Pt states hx of stroke February 2024.

## 2024-04-25 NOTE — ED PROVIDER NOTE - CLINICAL SUMMARY MEDICAL DECISION MAKING FREE TEXT BOX
Code stroke called for ataxia in left arm and left leg. Delay in calling code stroke due to time needed to obtain full history.

## 2024-04-25 NOTE — H&P ADULT - NSHPLABSRESULTS_GEN_ALL_CORE
Fingerstick Blood Glucose: CAPILLARY BLOOD GLUCOSE      POCT Blood Glucose.: 109 mg/dL (25 Apr 2024 13:20)    LABS:                        12.7   10.81 )-----------( 293      ( 25 Apr 2024 13:18 )             37.8     04-25    127<L>  |  90<L>  |  13  ----------------------------<  100<H>  4.3   |  26  |  1.13    Ca    8.6      25 Apr 2024 13:18    TPro  7.3  /  Alb  4.1  /  TBili  0.3  /  DBili  x   /  AST  22  /  ALT  29  /  AlkPhos  121<H>  04-25    PT/INR - ( 25 Apr 2024 13:18 )   PT: 11.9 sec;   INR: 1.05          PTT - ( 25 Apr 2024 13:18 )  PTT:28.9 sec      Urinalysis Basic - ( 25 Apr 2024 13:18 )    Color: x / Appearance: x / SG: x / pH: x  Gluc: 100 mg/dL / Ketone: x  / Bili: x / Urobili: x   Blood: x / Protein: x / Nitrite: x   Leuk Esterase: x / RBC: x / WBC x   Sq Epi: x / Non Sq Epi: x / Bacteria: x        RADIOLOGY & ADDITIONAL STUDIES:    < from: CT Brain Stroke Protocol (04.25.24 @ 13:26) >    Impression: No acute intracranial injury    < end of copied text >    < from: CT Brain Stroke Protocol (04.25.24 @ 13:26) >    Impression: Normal CTA of the extracranial circulation. No evidence of   carotid stenosis.    < end of copied text >      -----------------------------------------------------------------------------------------------------------------  IV-thrombolytic (Y/N):   no  Reason thrombolytic not given: outside window

## 2024-04-25 NOTE — H&P ADULT - NSHPPHYSICALEXAM_GEN_ALL_CORE
Physical exam:  Constitutional: No acute distress, conversant  Eyes: Anicteric sclerae, moist conjunctivae, see below for CNs  Neck: trachea midline, FROM, supple, no thyromegaly or lymphadenopathy  Cardiovascular: Regular rate and rhythm    Neurologic:  -Mental status: Awake, alert, oriented to person, age, and month. Speech is fluent with intact naming, repetition, and comprehension, no dysarthria. Recent and remote memory intact. Follows commands. Attention/concentration intact. Fund of knowledge appropriate.  -Cranial nerves:   II: Visual fields are full to confrontation.  III, IV, VI: Extraocular movements are intact without nystagmus. Pupils equally round and reactive to light  V:  Facial sensation V1-V3 equal and intact   VII: Face is symmetric  Motor: Normal bulk and tone. No pronator drift. Strength bilateral upper extremity 5/5, bilateral lower extremities 5/5.  Sensation: Intact to light touch bilaterally. No neglect or extinction on double simultaneous testing.  Coordination: +dysmetria on left finger-to-nose and heel-to-shin, right side intact  Gait: slightly ataxic, and would stumble a bit to the left    NIHSS: 2

## 2024-04-25 NOTE — ED PROVIDER NOTE - NIH STROKE SCALE: 1A. LEVEL OF CONSCIOUSNESS, QM
The Service to [facility] order in workqueue [id number] requested on 5/4/2022 has been cancelled as, duplicate order. If your patient is interested in [facility] services, they may call Central Scheduling at [phone] and we would be happy to assist them. The order is valid for one year from date placed. (0) Alert; keenly responsive

## 2024-04-26 ENCOUNTER — RESULT REVIEW (OUTPATIENT)
Age: 69
End: 2024-04-26

## 2024-04-26 ENCOUNTER — TRANSCRIPTION ENCOUNTER (OUTPATIENT)
Age: 69
End: 2024-04-26

## 2024-04-26 DIAGNOSIS — F31.9 BIPOLAR DISORDER, UNSPECIFIED: ICD-10-CM

## 2024-04-26 DIAGNOSIS — R26.81 UNSTEADINESS ON FEET: ICD-10-CM

## 2024-04-26 DIAGNOSIS — E03.9 HYPOTHYROIDISM, UNSPECIFIED: ICD-10-CM

## 2024-04-26 DIAGNOSIS — I10 ESSENTIAL (PRIMARY) HYPERTENSION: ICD-10-CM

## 2024-04-26 DIAGNOSIS — G40.909 EPILEPSY, UNSPECIFIED, NOT INTRACTABLE, WITHOUT STATUS EPILEPTICUS: ICD-10-CM

## 2024-04-26 LAB
ANION GAP SERPL CALC-SCNC: 10 MMOL/L — SIGNIFICANT CHANGE UP (ref 5–17)
BUN SERPL-MCNC: 14 MG/DL — SIGNIFICANT CHANGE UP (ref 7–23)
CALCIUM SERPL-MCNC: 9 MG/DL — SIGNIFICANT CHANGE UP (ref 8.4–10.5)
CHLORIDE SERPL-SCNC: 96 MMOL/L — SIGNIFICANT CHANGE UP (ref 96–108)
CHOLEST SERPL-MCNC: 174 MG/DL — SIGNIFICANT CHANGE UP
CO2 SERPL-SCNC: 25 MMOL/L — SIGNIFICANT CHANGE UP (ref 22–31)
CREAT SERPL-MCNC: 0.8 MG/DL — SIGNIFICANT CHANGE UP (ref 0.5–1.3)
EGFR: 95 ML/MIN/1.73M2 — SIGNIFICANT CHANGE UP
GLUCOSE SERPL-MCNC: 94 MG/DL — SIGNIFICANT CHANGE UP (ref 70–99)
HCT VFR BLD CALC: 39 % — SIGNIFICANT CHANGE UP (ref 39–50)
HDLC SERPL-MCNC: 67 MG/DL — SIGNIFICANT CHANGE UP
HGB BLD-MCNC: 12.9 G/DL — LOW (ref 13–17)
LIPID PNL WITH DIRECT LDL SERPL: 90 MG/DL — SIGNIFICANT CHANGE UP
MAGNESIUM SERPL-MCNC: 1.9 MG/DL — SIGNIFICANT CHANGE UP (ref 1.6–2.6)
MCHC RBC-ENTMCNC: 29.3 PG — SIGNIFICANT CHANGE UP (ref 27–34)
MCHC RBC-ENTMCNC: 33.1 GM/DL — SIGNIFICANT CHANGE UP (ref 32–36)
MCV RBC AUTO: 88.6 FL — SIGNIFICANT CHANGE UP (ref 80–100)
NON HDL CHOLESTEROL: 107 MG/DL — SIGNIFICANT CHANGE UP
NRBC # BLD: 0 /100 WBCS — SIGNIFICANT CHANGE UP (ref 0–0)
PHOSPHATE SERPL-MCNC: 4.5 MG/DL — SIGNIFICANT CHANGE UP (ref 2.5–4.5)
PLATELET # BLD AUTO: 308 K/UL — SIGNIFICANT CHANGE UP (ref 150–400)
POTASSIUM SERPL-MCNC: 3.9 MMOL/L — SIGNIFICANT CHANGE UP (ref 3.5–5.3)
POTASSIUM SERPL-SCNC: 3.9 MMOL/L — SIGNIFICANT CHANGE UP (ref 3.5–5.3)
RBC # BLD: 4.4 M/UL — SIGNIFICANT CHANGE UP (ref 4.2–5.8)
RBC # FLD: 12.1 % — SIGNIFICANT CHANGE UP (ref 10.3–14.5)
SODIUM SERPL-SCNC: 131 MMOL/L — LOW (ref 135–145)
TRIGL SERPL-MCNC: 95 MG/DL — SIGNIFICANT CHANGE UP
TSH SERPL-MCNC: 1.91 UIU/ML — SIGNIFICANT CHANGE UP (ref 0.27–4.2)
WBC # BLD: 8.82 K/UL — SIGNIFICANT CHANGE UP (ref 3.8–10.5)
WBC # FLD AUTO: 8.82 K/UL — SIGNIFICANT CHANGE UP (ref 3.8–10.5)

## 2024-04-26 PROCEDURE — 36415 COLL VENOUS BLD VENIPUNCTURE: CPT

## 2024-04-26 PROCEDURE — 70551 MRI BRAIN STEM W/O DYE: CPT | Mod: 26

## 2024-04-26 PROCEDURE — 82962 GLUCOSE BLOOD TEST: CPT

## 2024-04-26 PROCEDURE — 85610 PROTHROMBIN TIME: CPT

## 2024-04-26 PROCEDURE — 85027 COMPLETE CBC AUTOMATED: CPT

## 2024-04-26 PROCEDURE — 70498 CT ANGIOGRAPHY NECK: CPT | Mod: MC

## 2024-04-26 PROCEDURE — 97165 OT EVAL LOW COMPLEX 30 MIN: CPT

## 2024-04-26 PROCEDURE — 99223 1ST HOSP IP/OBS HIGH 75: CPT

## 2024-04-26 PROCEDURE — 85730 THROMBOPLASTIN TIME PARTIAL: CPT

## 2024-04-26 PROCEDURE — 80048 BASIC METABOLIC PNL TOTAL CA: CPT

## 2024-04-26 PROCEDURE — 80053 COMPREHEN METABOLIC PANEL: CPT

## 2024-04-26 PROCEDURE — 70496 CT ANGIOGRAPHY HEAD: CPT | Mod: MC

## 2024-04-26 PROCEDURE — 80061 LIPID PANEL: CPT

## 2024-04-26 PROCEDURE — 85025 COMPLETE CBC W/AUTO DIFF WBC: CPT

## 2024-04-26 PROCEDURE — 97161 PT EVAL LOW COMPLEX 20 MIN: CPT

## 2024-04-26 PROCEDURE — 84443 ASSAY THYROID STIM HORMONE: CPT

## 2024-04-26 PROCEDURE — 70551 MRI BRAIN STEM W/O DYE: CPT | Mod: MC

## 2024-04-26 PROCEDURE — 93005 ELECTROCARDIOGRAM TRACING: CPT

## 2024-04-26 PROCEDURE — 70450 CT HEAD/BRAIN W/O DYE: CPT | Mod: MC

## 2024-04-26 PROCEDURE — 93312 ECHO TRANSESOPHAGEAL: CPT | Mod: 26

## 2024-04-26 PROCEDURE — 93312 ECHO TRANSESOPHAGEAL: CPT

## 2024-04-26 PROCEDURE — 84484 ASSAY OF TROPONIN QUANT: CPT

## 2024-04-26 PROCEDURE — 93306 TTE W/DOPPLER COMPLETE: CPT | Mod: 26

## 2024-04-26 PROCEDURE — 84100 ASSAY OF PHOSPHORUS: CPT

## 2024-04-26 PROCEDURE — 93307 TTE W/O DOPPLER COMPLETE: CPT

## 2024-04-26 PROCEDURE — 0042T: CPT | Mod: MC

## 2024-04-26 PROCEDURE — 99291 CRITICAL CARE FIRST HOUR: CPT

## 2024-04-26 PROCEDURE — 83735 ASSAY OF MAGNESIUM: CPT

## 2024-04-26 PROCEDURE — 99222 1ST HOSP IP/OBS MODERATE 55: CPT

## 2024-04-26 PROCEDURE — 93325 DOPPLER ECHO COLOR FLOW MAPG: CPT | Mod: 26

## 2024-04-26 RX ORDER — LANOLIN ALCOHOL/MO/W.PET/CERES
5 CREAM (GRAM) TOPICAL AT BEDTIME
Refills: 0 | Status: DISCONTINUED | OUTPATIENT
Start: 2024-04-26 | End: 2024-04-27

## 2024-04-26 RX ORDER — ASPIRIN/CALCIUM CARB/MAGNESIUM 324 MG
0 TABLET ORAL
Refills: 0 | DISCHARGE

## 2024-04-26 RX ORDER — ACETAMINOPHEN 500 MG
650 TABLET ORAL EVERY 6 HOURS
Refills: 0 | Status: DISCONTINUED | OUTPATIENT
Start: 2024-04-26 | End: 2024-04-27

## 2024-04-26 RX ADMIN — Medication 81 MILLIGRAM(S): at 19:38

## 2024-04-26 RX ADMIN — Medication 650 MILLIGRAM(S): at 19:34

## 2024-04-26 RX ADMIN — Medication 129.6 MILLIGRAM(S): at 22:56

## 2024-04-26 RX ADMIN — ENOXAPARIN SODIUM 40 MILLIGRAM(S): 100 INJECTION SUBCUTANEOUS at 22:55

## 2024-04-26 RX ADMIN — CLOPIDOGREL BISULFATE 75 MILLIGRAM(S): 75 TABLET, FILM COATED ORAL at 19:35

## 2024-04-26 RX ADMIN — BUPROPION HYDROCHLORIDE 300 MILLIGRAM(S): 150 TABLET, EXTENDED RELEASE ORAL at 19:35

## 2024-04-26 RX ADMIN — ATORVASTATIN CALCIUM 80 MILLIGRAM(S): 80 TABLET, FILM COATED ORAL at 22:55

## 2024-04-26 RX ADMIN — Medication 100 MICROGRAM(S): at 06:01

## 2024-04-26 RX ADMIN — Medication 40 MILLIGRAM(S): at 19:35

## 2024-04-26 RX ADMIN — LAMOTRIGINE 300 MILLIGRAM(S): 25 TABLET, ORALLY DISINTEGRATING ORAL at 10:04

## 2024-04-26 RX ADMIN — Medication 129.6 MILLIGRAM(S): at 10:05

## 2024-04-26 RX ADMIN — LAMOTRIGINE 200 MILLIGRAM(S): 25 TABLET, ORALLY DISINTEGRATING ORAL at 22:57

## 2024-04-26 RX ADMIN — Medication 25 MILLIGRAM(S): at 23:20

## 2024-04-26 RX ADMIN — Medication 5 MILLIGRAM(S): at 23:19

## 2024-04-26 NOTE — OCCUPATIONAL THERAPY INITIAL EVALUATION ADULT - REHAB POTENTIAL, OT EVAL
Pt with no current neurological or physical symptoms impacting ADLs or functional mobility at this time, pt demonstrates safe and independent performance of ADLs and functional mobility with no AD and is discharged from skilled OT at this time

## 2024-04-26 NOTE — DISCHARGE NOTE PROVIDER - NSDCFUSCHEDAPPT_GEN_ALL_CORE_FT
[facility]  [facility] [address]  Scheduled Appointment: 05/24/2024    Baron Hassan  [facility]  [facility] [address]  Scheduled Appointment: 06/10/2024

## 2024-04-26 NOTE — OCCUPATIONAL THERAPY INITIAL EVALUATION ADULT - MODALITIES TREATMENT COMMENTS
Cranial Nerves II - XII: II: PERRLA; visual fields are full to confrontation III, IV, VI: EOMI, no nystagmus appreciated V: facial sensation intact to light touch V1-V3 b/l VII: no ptosis, no facial droop, symmetric eyebrow raise and closure VIII: hearing intact to finger rub b/l  XI: head turning and shoulder shrug intact b/l XII: tongue protrusion midline. Vision Tracking (H Test): smooth b/l pursuits, mild overshooting noted in B/L eyes. Vision Quadrant Test: WFL B/L

## 2024-04-26 NOTE — DISCHARGE NOTE PROVIDER - NSDCMRMEDTOKEN_GEN_ALL_CORE_FT
aspirin 81 mg oral tablet: orally once a day  atorvastatin 40 mg oral tablet: 1 tab(s) orally once a day (at bedtime)  buPROPion 300 mg/24 hours (XL) oral tablet, extended release: 1 tab(s) orally once a day  FLUoxetine 40 mg oral capsule: 2 cap(s) orally once a day  irbesartan 150 mg oral tablet: 1 tab(s) orally once a day  lamoTRIgine 100 mg oral tablet: 3 tab(s) orally 2 times a day 3 tabs in the morning, 2 tabs at night  meloxicam 15 mg oral tablet: 1 tab(s) orally once a day  PHENobarbital 64.8 mg oral tablet: 2 tab(s) orally 2 times a day  Synthroid 100 mcg (0.1 mg) oral tablet: 1 tab(s) orally once a day  traZODone 50 mg oral tablet: 1 tab(s) orally once a day (at bedtime)

## 2024-04-26 NOTE — CONSULT NOTE ADULT - PROBLEM SELECTOR RECOMMENDATION 9
Pt. c/o unsteady gait, found to have dysmetria on exam, c/f stroke, in setting of TNK-aborted CVA hx (2023) on ASA  -- cont. work-up and mgmt per Neuro  -- PT/OT  -- on DAPT + high-intensity statin  -- EPS to interrogate ILR  -- Pt. reports compliance w/ ASA; f/u [facility]  -- plan for MRI brain, TTE, COY

## 2024-04-26 NOTE — PHYSICAL THERAPY INITIAL EVALUATION ADULT - GAIT DEVIATIONS NOTED, PT EVAL
Pt w/ appropriate whit/step length, steady gait, no LOB/knee buckling noted; good negotiation through hallway obstacles without gait disturbances noted.

## 2024-04-26 NOTE — PHYSICAL THERAPY INITIAL EVALUATION ADULT - PERTINENT HX OF CURRENT PROBLEM, REHAB EVAL
68y Male with PMHx of epilepsy on Phenobarbital, HTN, HLD, bipolar disorder on lamictal, CVA in 8/2023 (presented with mixed aphasia s/p TNK, negative MRI post TNK), coming in with difficulty walking. Pt states that yesterday he was very worried because his wife had a CT scan and there was a suspicious nodule. He couldn't sleep at night and had forgotten to take his nighttime meds. He said he then remembered to take his meds at 3 am, so took all of them including trazodone. He woke up at his usual time at 6:30am, felt relatively normal then and made breakfast. He states a little after 8AM he felt wobbly, like his balance was off. He decided to leave and go to the office anyway, and when he got there he felt his "wobbliness" got worse and he fell (but caught himself). He then went to [facility], and then was transferred to [place].  He states he still feels wobbly when he walks. He notes that he had a pelvic fracture so his left leg is a little more wobbly than his right at baseline.

## 2024-04-26 NOTE — PROGRESS NOTE ADULT - SUBJECTIVE AND OBJECTIVE BOX
Neurology Stroke Progress Note    INTERVAL HPI/OVERNIGHT EVENTS:  Patient seen and examined.     MEDICATIONS  (STANDING):  aspirin enteric coated 81 milliGRAM(s) Oral daily  atorvastatin 80 milliGRAM(s) Oral at bedtime  buPROPion XL (24-Hour) . 300 milliGRAM(s) Oral daily  clopidogrel Tablet 75 milliGRAM(s) Oral daily  enoxaparin Injectable 40 milliGRAM(s) SubCutaneous every 24 hours  FLUoxetine 40 milliGRAM(s) Oral daily  lamoTRIgine 300 milliGRAM(s) Oral every 24 hours  lamoTRIgine 200 milliGRAM(s) Oral every 24 hours  levothyroxine 100 MICROGram(s) Oral with breakfast  PHENobarbital 129.6 milliGRAM(s) Oral two times a day  traZODone 25 milliGRAM(s) Oral at bedtime    MEDICATIONS  (PRN):      Allergies    penicillin (Hives)    Intolerances        Vital Signs Last 24 Hrs  T(C): 36.6 (26 Apr 2024 08:37), Max: 37 (25 Apr 2024 13:09)  T(F): 97.8 (26 Apr 2024 08:37), Max: 98.6 (25 Apr 2024 13:09)  HR: 68 (26 Apr 2024 08:15) (65 - 76)  BP: 155/88 (26 Apr 2024 08:15) (122/76 - 160/85)  BP(mean): 113 (26 Apr 2024 08:15) (91 - 122)  RR: 16 (26 Apr 2024 08:15) (16 - 20)  SpO2: 99% (26 Apr 2024 08:15) (96% - 99%)    Parameters below as of 26 Apr 2024 08:15  Patient On (Oxygen Delivery Method): room air      Physical exam:  Constitutional: No acute distress, conversant  Eyes: Anicteric sclerae, moist conjunctivae, see below for CNs  Neck: trachea midline, FROM, supple, no thyromegaly or lymphadenopathy  Cardiovascular: Regular rate and rhythm    Neurologic:  -Mental status: Awake, alert, oriented to person, age, and month. Speech is fluent with intact naming, repetition, and comprehension, no dysarthria. Recent and remote memory intact. Follows commands. Attention/concentration intact. Fund of knowledge appropriate.  -Cranial nerves:   II: Visual fields are full to confrontation.  III, IV, VI: Extraocular movements are intact without nystagmus. Pupils equally round and reactive to light  V:  Facial sensation V1-V3 equal and intact   VII: Face is symmetric  Motor: Normal bulk and tone. No pronator drift. Strength bilateral upper extremity 5/5, bilateral lower extremities 5/5.  Sensation: Intact to light touch bilaterally. No neglect or extinction on double simultaneous testing.  Coordination: +dysmetria on left finger-to-nose and heel-to-shin, right side intact    LABS:                        12.9   8.82  )-----------( 308      ( 26 Apr 2024 05:27 )             39.0     04-26    131<L>  |  96  |  14  ----------------------------<  94  3.9   |  25  |  0.80    Ca    9.0      26 Apr 2024 05:27  Phos  4.5     04-26  Mg     1.9     04-26    TPro  7.3  /  Alb  4.1  /  TBili  0.3  /  DBili  x   /  AST  22  /  ALT  29  /  AlkPhos  121<H>  04-25    PT/INR - ( 25 Apr 2024 13:18 )   PT: 11.9 sec;   INR: 1.05          PTT - ( 25 Apr 2024 13:18 )  PTT:28.9 sec  Urinalysis Basic - ( 26 Apr 2024 05:27 )    Color: x / Appearance: x / SG: x / pH: x  Gluc: 94 mg/dL / Ketone: x  / Bili: x / Urobili: x   Blood: x / Protein: x / Nitrite: x   Leuk Esterase: x / RBC: x / WBC x   Sq Epi: x / Non Sq Epi: x / Bacteria: x        RADIOLOGY & ADDITIONAL TESTS:  < from: Echo W Bubble w/o Doppler Complete (04.26.24 @ 10:59) >  CONCLUSIONS:     1. Normal left ventricular size and systolic function.   2. Mild symmetric left ventricular hypertrophy.   3. Normal right ventricular size and systolic function.   4. Normal atria.   5. Injection of agitated saline via a peripheral vein reveals no   evidence of a right-to-left shunt.   6. Mild aortic regurgitation.   7. Aortic sclerosis without significant stenosis.   8. Systolic anterior motion of the mitral valve is seen without evidence   of LVOT obstruction.   9. No pericardial effusion.  10. Mildly dilated ascending aorta.  11. Compared to the previous TTE performed on 8/23/2023, there have been   no significant interval changes.    < end of copied text >

## 2024-04-26 NOTE — CONSULT NOTE ADULT - ASSESSMENT
Neurology     68y Male with PMHx of epilepsy on Phenobarbital, HTN, HLD, bipolar disorder on lamictal, CVA in 8/2023 (presented with mixed aphasia s/p TNK, negative MRI post TNK), coming in with difficulty walking. CT scan negative for stroke, cTA with no stenosis. CTP with mismatch in the right anterior temporal lobe and left frontal lobe, though limited by motion. Exam significant for left sided dysmetria. Admitted for stroke workup.    Neuro  #CVA workup  - continue aspirin 81mg and plavix 75mg daily  - continue atorvastatin 80mg daily  - q4hr stroke neuro checks and vitals  - obtain MRI Brain without contrast  - Stroke Code HCT Results: neg  - Stroke Code CTA Results: neg  - Stroke education    #epilepsy  -continue home phenobarbital    Cards  #HTN  - Goal -180  - hold home blood pressure medication for now  - obtain TTE     #HLD  - high dose statin as above in CVA  - LDL results: pending    Pulm  - call provider if SPO2 < 94%    GI  #Nutrition/Fluids/Electrolytes   - replete K<4 and Mg <2  - Diet: DASH    #hyponatremia (Na 127 on admission) - pt did say he drank a lot of water today  -Na noted to be between 128-131 on last admission  -follow up AM sodium     Infectious Disease  - Stroke Code CXR results:     Psych  #bipolar disorder  -continue home lamictal  -continue home fluoxetine  -holding home trazodone for now    Endocrine  #DM  - A1C results: pending    - TSH results: pending  -continue home synthroid    DVT Prophylaxis  - lovenox sq for DVT prophylaxis   - SCDs for DVT prophylaxis     Pt to be discussed during rounds with Dr. Luma PROCTOR Goals: Goals reviewed at interdisciplinary rounds with case management, social work, physical therapy, occupational therapy, and speech language pathology.      Discussed daily hospital plans and goals with patient   
67 y/o M w/

## 2024-04-26 NOTE — OCCUPATIONAL THERAPY INITIAL EVALUATION ADULT - ADDITIONAL COMMENTS
Pt lives in a 5 story walk up apartment with his wife. Pt owns a cane but does not use it all the time, pt with a tub shower. Pt is R handed. Pt wears glasses for reading and has glasses for driving.

## 2024-04-26 NOTE — CONSULT NOTE ADULT - SUBJECTIVE AND OBJECTIVE BOX
Patient is a 68y old  Male who presents with a chief complaint of difficulty walking (25 Apr 2024 22:25)      HPI:  Last known well time/Time of onset of symptoms: 6:30AM    HPI: 68y Male with PMHx of epilepsy on Phenobarbital, HTN, HLD, bipolar disorder on lamictal, CVA in 8/2023 (presented with mixed aphasia s/p TNK, negative MRI post TNK), coming in with difficulty walking. Pt states that yesterday he was very worried because his wife had a CT scan and there was a suspicious nodule. He couldn't sleep at night and had forgotten to take his nighttime meds. He said he then remembered to take his meds at 3 am, so took all of them including trazodone. He woke up at his usual time at 6:30am, felt relatively normal then and made breakfast. He states a little after 8AM he felt wobbly, like his balance was off. He decided to leave and go to the office anyway, and when he got there he felt his "wobbliness" got worse and he fell (but caught himself). He then went to [facility], and then was transferred to [place].  He states he still feels wobbly when he walks. He notes that he had a pelvic fracture so his left leg is a little more wobbly than his right at baseline.     T(C): 36.7 (04-25-24 @ 21:59), Max: 37 (04-25-24 @ 13:09)  HR: 65 (04-25-24 @ 20:05) (65 - 75)  BP: 128/69 (04-25-24 @ 20:05) (122/76 - 160/85)  RR: 17 (04-25-24 @ 20:05) (17 - 19)  SpO2: 98% (04-25-24 @ 20:05) (96% - 99%)    PAST MEDICAL & SURGICAL HISTORY:  Prostate cancer      Cerebrovascular accident     (25 Apr 2024 22:25)    PAST MEDICAL & SURGICAL HISTORY:  Prostate cancer      Cerebrovascular accident        MEDICATIONS  (STANDING):  aspirin enteric coated 81 milliGRAM(s) Oral daily  atorvastatin 80 milliGRAM(s) Oral at bedtime  buPROPion XL (24-Hour) . 300 milliGRAM(s) Oral daily  clopidogrel Tablet 75 milliGRAM(s) Oral daily  enoxaparin Injectable 40 milliGRAM(s) SubCutaneous every 24 hours  FLUoxetine 40 milliGRAM(s) Oral daily  lamoTRIgine 300 milliGRAM(s) Oral every 24 hours  lamoTRIgine 200 milliGRAM(s) Oral every 24 hours  levothyroxine 100 MICROGram(s) Oral with breakfast  PHENobarbital 129.6 milliGRAM(s) Oral two times a day  traZODone 25 milliGRAM(s) Oral at bedtime    MEDICATIONS  (PRN):            FAMILY HISTORY:      CBC Full  -  ( 26 Apr 2024 05:27 )  WBC Count : 8.82 K/uL  RBC Count : 4.40 M/uL  Hemoglobin : 12.9 g/dL  Hematocrit : 39.0 %  Platelet Count - Automated : 308 K/uL  Mean Cell Volume : 88.6 fl  Mean Cell Hemoglobin : 29.3 pg  Mean Cell Hemoglobin Concentration : 33.1 gm/dL  Auto Neutrophil # : x  Auto Lymphocyte # : x  Auto Monocyte # : x  Auto Eosinophil # : x  Auto Basophil # : x  Auto Neutrophil % : x  Auto Lymphocyte % : x  Auto Monocyte % : x  Auto Eosinophil % : x  Auto Basophil % : x      04-26    131<L>  |  96  |  14  ----------------------------<  94  3.9   |  25  |  0.80    Ca    9.0      26 Apr 2024 05:27  Phos  4.5     04-26  Mg     1.9     04-26    TPro  7.3  /  Alb  4.1  /  TBili  0.3  /  DBili  x   /  AST  22  /  ALT  29  /  AlkPhos  121<H>  04-25      Urinalysis Basic - ( 26 Apr 2024 05:27 )    Color: x / Appearance: x / SG: x / pH: x  Gluc: 94 mg/dL / Ketone: x  / Bili: x / Urobili: x   Blood: x / Protein: x / Nitrite: x   Leuk Esterase: x / RBC: x / WBC x   Sq Epi: x / Non Sq Epi: x / Bacteria: x        Radiology :     < from: CT Brain Stroke Protocol (04.25.24 @ 13:26) >  ACC: [id number] EXAM:  CT ANGIO BRAIN STROKE PROTC IC   ORDERED BY: KRISTOFER PENA     ACC: [id number] EXAM:  CT ANGIO NECK STROKE PROTCL IC   ORDERED BY: KRISTOFER PENA     ACC: [id number] EXAM:  CT BRAIN STROKE PROTOCOL   ORDERED BY: KRISTOFER PENA     ACC: [id number] EXAM:  CT BRAIN PERFUSION MAPS STROKE   ORDERED BY: KRISTOFER PENA     PROCEDURE DATE:  04/25/2024          INTERPRETATION:  Stroke.    Technique: CT head was performed utilizing axial images from the base of   the skull throughthe vertex without the administration of intravenous   contrast. Images were reviewed in the bone, brain and subdural windows.    Findings:: Comparison is made to a prior CT performed on 8/23/2023 There   is no evidence of acute intracranial hemorrhage or acute transcortical   infarct. There are patchy areas of low density within the periventricular   white matter consistent with mild microvascular disease in a patient of   this age. There are a few tiny basal ganglia chronic lacunar infarcts,   unchanged The ventricles are normal in size and caliber.  There is no evidence of mass-effect or midline shift. There is no   evidence of an intra or extra-axial fluid collection.    Small left sphenoid polyp versus retention cyst that is hyperdense,   unchanged. Small left maxillary polyp versus retention cyst. The   remaining paranasal sinuses and bilateral mastoid air cells are clear.    Impression: No acute intracranial injury    The results were discussed with Dr. Pena 1:28 PM on 4/25/2024. This   study was performed on 4/25/2024 at 1:24 PM    CT PERFUSION:    TECHNIQUE: Following the intravenous administration of 40  ml of Optiray   350, serial axial images were obtained through the brain. The CT   perfusion data set was post processed per ischemia View RAPID protocol   generating color maps of CBF, CBV, MTT, and Tmax.    COMPARISON: None    FINDINGS: There is motion artifact that limits this evaluation.    CBF less than 30% volume: 0 mL.  Tmax greater than 6 seconds: 8 mL. Color maps correspond to the right   anterior temporal lobe and left frontal lobe.  Mismatch volume: 8 mL.  Mismatch ratio: Infinite    IMPRESSION: Limited evaluation. Tmax greater than 6 seconds: 8 mL.    Mismatch volume: 8 mL.  Mismatch ratio: Infinite        CT ANGIOGRAM OF THE INTRACRANIAL CAROTID ARTERIES:    Technique: CT angiogram of the intracranial carotid arteries was   performed was performed utilizing helical slices from the base of the   skull through the vertex during bolus injection of intravenous contrast   material. Overlapping reconstructions were obtained to allow for sagittal   and coronal reformatted images . 3-D images were created from the data   set. Images were also included.      Findings: Evaluation of the anterior circulation demonstrates normal   course and caliber of the distal internal carotid arteries. There is a   normal appearance to the bilateral anterior cerebral and middle cerebral   arteries.    Evaluation the posterior circulation demonstrates normal course and   caliber of the bilateral vertebral arteries, vertebrobasilar junction and   basilar artery. The bilateral posterior cerebral arteries are also within   normal limits. There is a right posterior communicating artery present.    There is no evidence ofaneurysm or stenosis .    Impression: Normal CTA of the intracranial circulation.      CT ANGIOGRAM OF THE EXTRACRANIAL CAROTID ARTERIES:    Technique: CT angiogram of the extracranial carotid arteries was   performed was performed utilizing helical slices from the base of the   skull through the mediastinum during bolus injection of intravenous   contrast material. Overlapping reconstructions were obtained to allow for   sagittal and coronal reformatted images. 3-D reconstruction was also   performed.    Findings: Evaluation of the aortic arch and the origin of the great   vessels are within normal limits. There mild calcifications along the   aortic arch consistent with atherosclerotic disease.    The course and caliber of the bilateral common carotid, internal carotid   and external carotid arteries are within normal limits. There is no   evidence of focal stenosis or dissection. There are a few punctate   calcifications seen along the proximal right internal carotid artery   consistent with atherosclerotic disease. There are a few coarse   calcifications seen at the origin of the left internal carotid artery   with mild soft plaque.    There are a few coarse calcifications at the origin of the right   vertebral artery. The origin of the vertebral arteries are within normal   limits without evidence of stenosis. The course and caliber of the   cervical segments of the vertebral arteries are normal.    Impression: Normal CTA of the extracranial circulation. No evidence of   carotid stenosis.            Review of Systems : per HPI         Vital Signs Last 24 Hrs  T(C): 36.8 (26 Apr 2024 04:42), Max: 37 (25 Apr 2024 13:09)  T(F): 98.3 (26 Apr 2024 04:42), Max: 98.6 (25 Apr 2024 13:09)  HR: 65 (26 Apr 2024 04:33) (65 - 76)  BP: 139/69 (26 Apr 2024 04:33) (122/76 - 160/85)  BP(mean): 97 (26 Apr 2024 04:33) (91 - 122)  RR: 18 (26 Apr 2024 04:33) (17 - 20)  SpO2: 98% (26 Apr 2024 04:33) (96% - 99%)    Parameters below as of 26 Apr 2024 04:33  Patient On (Oxygen Delivery Method): room air            Physical Exam:  68 y o man lying comfortably in semi Noel's position , awake , alert , no acute complaints     Head: normocephalic , atraumatic    Eyes: PERRLA , EOMI , no nystagmus , sclera anicteric    ENT / FACE: neg nasal discharge , uvula midline , no oropharyngeal erythema / exudate    Neck: supple , negative JVD , negative carotid bruits , no thyromegaly    Chest: CTA bilaterally , neg wheeze / rhonchi / rales / crackles / egophany    Cardiovascular: regular rate and rhythm , neg murmurs / rubs / gallops    Abdomen: soft , non distended , no tenderness to palpation in all 4 quadrants ,  normal bowel sounds     Extremities: WWP , neg cyanosis /clubbing / edema , negative calf tenderness to palpation , negative Abdulkadir's sign    Musculoskeletal:     Skin:     :     Neurologic Exam:     Alert and oriented to person , place , date/year , speech fluent w/o dysarthria , follows commands , recent and remote memory intact , repetition intact , comprehension intact ,  attention/concentration intact , fund of knowledge appropriate    Cranial Nerves:           II:                         pupils equal , round and reactive to light , visual fields intact         III/ IV/VI:             extraocular movements intact , neg nystagmus , neg ptosis        V:                        facial sensation intact , V1-3 normal        VII:                      face symmetric , no droop , normal eye closure and smile        VIII:                     hearing intact to finger rub bilaterally        IX and X:             no hoarseness , gag intact , palate/ uvula rise symmetrically        XI:                       SCM / trapezius strength intact bilateral        XII:                      no tongue deviation    Motor Exam:        > 4/5 x 4 extremities , without drift     Sensation:         intact to light touch x 4 extremities                            no neglect or extinction on double simultaneous testing    DTR:           biceps/brachioradialis: equal                            patella/ankle: equal          neg Babinski         Coordination:            Finger to Nose:   slighh L hypometria    Gait:  not tested         PM&R Impression: admitted for gait imbalance    - no acute pathology on CT brain imaging , MRI pending       Recommendations / Plan:       1) Physical / Occupational therapy focusing on therapeutic exercises , equipment evaluation , bed mobility/transfer out of bed evaluation , progressive ambulation with assistive devices prn .    2) Current disposition plan recommendation:    pending functional progress       
  Patient is a 68y old  Male who presents with a chief complaint of difficulty walking (26 Apr 2024 08:45)    HPI:  Last known well time/Time of onset of symptoms: 6:30AM    HPI: 68y Male with PMHx of epilepsy on Phenobarbital, HTN, HLD, bipolar disorder on lamictal, CVA in 8/2023 (presented with mixed aphasia s/p TNK, negative MRI post TNK), coming in with difficulty walking. Pt states that yesterday he was very worried because his wife had a CT scan and there was a suspicious nodule. He couldn't sleep at night and had forgotten to take his nighttime meds. He said he then remembered to take his meds at 3 am, so took all of them including trazodone. He woke up at his usual time at 6:30am, felt relatively normal then and made breakfast. He states a little after 8AM he felt wobbly, like his balance was off. He decided to leave and go to the office anyway, and when he got there he felt his "wobbliness" got worse and he fell (but caught himself). He then went to [facility], and then was transferred to [place].  He states he still feels wobbly when he walks. He notes that he had a pelvic fracture so his left leg is a little more wobbly than his right at baseline.   (25 Apr 2024 22:25)    Review of Systems: 12 point review of systems otherwise negative    PAST MEDICAL & SURGICAL HISTORY:  HTN  Prostate cancer  Bipolar d/o  Epilepsy  Hypothyroidism  Cerebrovascular accident      MEDICATIONS  (STANDING):  aspirin enteric coated 81 milliGRAM(s) Oral daily  atorvastatin 80 milliGRAM(s) Oral at bedtime  buPROPion XL (24-Hour) . 300 milliGRAM(s) Oral daily  clopidogrel Tablet 75 milliGRAM(s) Oral daily  enoxaparin Injectable 40 milliGRAM(s) SubCutaneous every 24 hours  FLUoxetine 40 milliGRAM(s) Oral daily  lamoTRIgine 300 milliGRAM(s) Oral every 24 hours  lamoTRIgine 200 milliGRAM(s) Oral every 24 hours  levothyroxine 100 MICROGram(s) Oral with breakfast  PHENobarbital 129.6 milliGRAM(s) Oral two times a day  traZODone 25 milliGRAM(s) Oral at bedtime    MEDICATIONS  (PRN):      Allergies    penicillin (Hives)    Intolerances          Vital Signs Last 24 Hrs  T(C): 36.6 (26 Apr 2024 08:37), Max: 37 (25 Apr 2024 13:09)  T(F): 97.8 (26 Apr 2024 08:37), Max: 98.6 (25 Apr 2024 13:09)  HR: 68 (26 Apr 2024 08:15) (65 - 76)  BP: 155/88 (26 Apr 2024 08:15) (122/76 - 160/85)  BP(mean): 113 (26 Apr 2024 08:15) (91 - 122)  RR: 16 (26 Apr 2024 08:15) (16 - 20)  SpO2: 99% (26 Apr 2024 08:15) (96% - 99%)    Parameters below as of 26 Apr 2024 08:15  Patient On (Oxygen Delivery Method): room air      CAPILLARY BLOOD GLUCOSE      POCT Blood Glucose.: 109 mg/dL (25 Apr 2024 13:20)      04-25 @ 07:01  -  04-26 @ 07:00  --------------------------------------------------------  IN: 0 mL / OUT: 400 mL / NET: -400 mL    04-26 @ 07:01  -  04-26 @ 12:26  --------------------------------------------------------  IN: 0 mL / OUT: 0 mL / NET: 0 mL        Physical Exam:  (earlier today)  Daily Height in cm: 162.56 (25 Apr 2024 19:40)    Daily   General:  well appearing in NAD  HEENT:  MMM  CV:  RRR  Lungs:  CTA B/L  Abdomen:  soft NT ND  Extremities:  no edema B/L LE  Skin:  WWP  Neuro:  AAOx3, no dysarthria    LABS:                        12.9   8.82  )-----------( 308      ( 26 Apr 2024 05:27 )             39.0     04-26    131<L>  |  96  |  14  ----------------------------<  94  3.9   |  25  |  0.80    Ca    9.0      26 Apr 2024 05:27  Phos  4.5     04-26  Mg     1.9     04-26    TPro  7.3  /  Alb  4.1  /  TBili  0.3  /  DBili  x   /  AST  22  /  ALT  29  /  AlkPhos  121<H>  04-25    PT/INR - ( 25 Apr 2024 13:18 )   PT: 11.9 sec;   INR: 1.05          PTT - ( 25 Apr 2024 13:18 )  PTT:28.9 sec  Urinalysis Basic - ( 26 Apr 2024 05:27 )    Color: x / Appearance: x / SG: x / pH: x  Gluc: 94 mg/dL / Ketone: x  / Bili: x / Urobili: x   Blood: x / Protein: x / Nitrite: x   Leuk Esterase: x / RBC: x / WBC x   Sq Epi: x / Non Sq Epi: x / Bacteria: x

## 2024-04-26 NOTE — OCCUPATIONAL THERAPY INITIAL EVALUATION ADULT - OCCUPATION
Problem: Self Care Deficits Care Plan (Adult)  Goal: *Acute Goals and Plan of Care (Insert Text)  1. Patient will complete lower body bathing and dressing with MOD I and adaptive equipment as needed. 2. Patient will complete toileting with MOD I.   3. Patient will tolerate 23 minutes of OT treatment with less than 4 rest breaks to increase activity tolerance for ADLs. 4. Patient will complete functional transfers with MOD I and adaptive equipment as needed. Timeframe: 7 visits     Comments:     OCCUPATIONAL THERAPY: Initial Assessment and PM 5/24/2018  INPATIENT: Hospital Day: 2  Payor: Frederic Can / Plan: [address] [place] [address] HMO / Product Type: Managed Care Medicare /      NAME/AGE/GENDER: Ellen Monroy is a 74 y.o. male   PRIMARY DIAGNOSIS:  Acute respiratory failure with hypoxemia ([address] Acute on chronic systolic heart failure (HCC) Acute on chronic systolic heart failure ([facility]        ICD-10: Treatment Diagnosis:    · Generalized Muscle Weakness (M62.81)   Precautions/Allergies:     Pcn [penicillins]      ASSESSMENT:     Mr. Jose Velazquez presents to hospital for above. Pt lives with significant other, in which they rotate between both of his daughters' homes. He is typically independent with ADLs/IADLs, including driving. Pt denies use of AD/DME at baseline for functional mobility; pt endorses 0 falls in the last 6 months. Both patient and significant other suggest decline in functional status x2 weeks. Today, pt is found seated at EOB upon arrival, AOx4, and agreeable to OT evaluation. Per BUE screen, AROM, strength, and coordination are WNL. Pt completes STS with CGA and completes functional transfer with CGA, demonstrating fair balance in standing. Pt appears to be slightly impulsive and unsafe with mobility this afternoon. Pt left with possessions in reach and all needs met. Mr. Jose Velazquez presents with functional limitations listed below and appears to be functioning slightly below baseline.  They will benefit from continued skilled OT services to maximize safety and independence with ADLs. Will follow during acute stay. This section established at most recent assessment   PROBLEM LIST (Impairments causing functional limitations):  1. Decreased Strength  2. Decreased ADL/Functional Activities  3. Decreased Transfer Abilities  4. Decreased Balance  5. Decreased Activity Tolerance   INTERVENTIONS PLANNED: (Benefits and precautions of occupational therapy have been discussed with the patient.)  1. Activities of daily living training  2. Adaptive equipment training  3. Balance training  4. Clothing management  5. Donning&doffing training  6. Group therapy  7. Therapeutic activity  8. Therapeutic exercise     TREATMENT PLAN: Frequency/Duration: Follow patient 3x/week to address above goals. Rehabilitation Potential For Stated Goals: Good     RECOMMENDED REHABILITATION/EQUIPMENT: (at time of discharge pending progress): Due to the probability of continued deficits (see above) this patient will likely need continued skilled occupational therapy after discharge. Equipment:    None at this time              OCCUPATIONAL PROFILE AND HISTORY:   History of Present Injury/Illness (Reason for Referral):  See H&P  Past Medical History/Comorbidities:   Mr. Laura Ferrari  has a past medical history of Acute CHF (congestive heart failure) ([address] (4/25/2015); Acute combined systolic and diastolic congestive heart failure ([address] (4/28/2015); Chronic obstructive pulmonary disease ([address] De Quervain's tenosynovitis, right (4/28/2015); Dyspnea on exertion (6/28/2015); Elevated serum creatinine (4/25/2015); Elevated troponin (6/28/2015); History of coronary artery disease; History of right knee surgery; History of shingles; Malignant hypertension (4/25/2015); and MI (myocardial infarction) ([address] Mr. aLura Ferrari  has a past surgical history that includes hx knee arthroscopy (Right) and hx heart catheterization (6/29/2015).   Social History/Living Environment:   Home Environment: Apartment  # Steps to Enter: 0  One/Two Story Residence: One story  Living Alone: No  Support Systems: Spouse/Significant Other/Partner, Child(isatu)  Patient Expects to be Discharged to[de-identified] Apartment  Current DME Used/Available at Home: Oxygen, portable, Nebulizer  Tub or Shower Type: Tub/Shower combination  Prior Level of Function/Work/Activity:  [place] with ADLs. Decline x 2 weeks. Personal Factors:          Sex:  male        Age:  74 y.o. Number of Personal Factors/Comorbidities that affect the Plan of Care: Expanded review of therapy/medical records (1-2):  MODERATE COMPLEXITY   ASSESSMENT OF OCCUPATIONAL PERFORMANCE[de-identified]   Activities of Daily Living:           Basic ADLs (From Assessment) Complex ADLs (From Assessment)   Feeding: Independent  Oral Facial Hygiene/Grooming: Independent  Bathing: Contact guard assistance  Upper Body Dressing: Independent  Lower Body Dressing: Minimum assistance  Toileting: Contact guard assistance     Grooming/Bathing/Dressing Activities of Daily Living     Cognitive Retraining  Safety/Judgement: Driving appropriateness; Fall prevention; Awareness of environment                 Functional Transfers  Toilet Transfer : Contact guard assistance     Bed/Mat Mobility  Rolling: Supervision  Supine to Sit: Contact guard assistance  Sit to Supine:  (left up in chair)  Sit to Stand: Contact guard assistance  Bed to Chair: Contact guard assistance  Scooting: Supervision;Stand-by assistance       Most Recent Physical Functioning:   Gross Assessment:                  Posture:  Posture (WDL): Exceptions to WDL  Posture Assessment:  Forward head, Rounded shoulders  Balance:  Sitting: Intact  Standing: Impaired  Standing - Static: Fair  Standing - Dynamic : Fair Bed Mobility:  Rolling: Supervision  Supine to Sit: Contact guard assistance  Sit to Supine:  (left up in chair)  Scooting: Supervision;Stand-by assistance  Wheelchair Mobility: Transfers:  Sit to Stand: Contact guard assistance  Stand to Sit: Contact guard assistance  Bed to Chair: Contact guard assistance                Patient Vitals for the past 6 hrs:   BP SpO2 O2 Flow Rate (L/min) Pulse   05/24/18 1038 - 98 % 4 l/min -   05/24/18 1125 122/77 96 % - 80       Mental Status  Neurologic State: Alert  Orientation Level: Oriented X4  Cognition: Follows commands  Perception: Appears intact  Perseveration: No perseveration noted  Safety/Judgement: Driving appropriateness, Fall prevention, Awareness of environment                          Physical Skills Involved:  1. Balance  2. Strength  3. Activity Tolerance  4. Edema Cognitive Skills Affected (resulting in the inability to perform in a timely and safe manner):  1. n/a Psychosocial Skills Affected:  1. Habits/Routines  2. Environmental Adaptation  3. Social Roles   Number of elements that affect the Plan of Care: 5+:  HIGH COMPLEXITY   CLINICAL DECISION MAKING:   [facility] AM-PAC 6 Clicks   Daily Activity Inpatient Short Form  How much help from another person does the patient currently need. .. Total A Lot A Little None   1. Putting on and taking off regular lower body clothing? [] 1   [] 2   [x] 3   [] 4   2. Bathing (including washing, rinsing, drying)? [] 1   [] 2   [x] 3   [] 4   3. Toileting, which includes using toilet, bedpan or urinal?   [] 1   [] 2   [x] 3   [] 4   4. Putting on and taking off regular upper body clothing? [] 1   [] 2   [] 3   [x] 4   5. Taking care of personal grooming such as brushing teeth? [] 1   [] 2   [] 3   [x] 4   6. Eating meals? [] 1   [] 2   [] 3   [x] 4   © 2007, Trustees of [facility], under license to [employer]. All rights reserved      Score:  Initial: 21 Most Recent: X (Date: -- )    Interpretation of Tool:  Represents activities that are increasingly more difficult (i.e. Bed mobility, Transfers, Gait).    Score 24 23 22-20 19-15 14-10 9-7 6     Modifier CH CI CJ CK CL CM CN      ? Self Care:     - CURRENT STATUS: CJ - 20%-39% impaired, limited or restricted    - GOAL STATUS: CI - 1%-19% impaired, limited or restricted    - D/C STATUS:  ---------------To be determined---------------  Payor: Ayaanvickie Console / Plan: [address] [place] [address] HMO / Product Type: Managed Care Medicare /      Medical Necessity:     · Patient is expected to demonstrate progress in strength, balance and functional technique to improve safety during ADLs. Reason for Services/Other Comments:  · Patient continues to require modification of therapeutic interventions to increase complexity of exercises. Use of outcome tool(s) and clinical judgement create a POC that gives a: LOW COMPLEXITY         TREATMENT:   (In addition to Assessment/Re-Assessment sessions the following treatments were rendered)     Pre-treatment Symptoms/Complaints:    Pain: Initial:   Pain Intensity 1: 0  Post Session:  same     Assessment/Reassessment only, no treatment provided today    Braces/Orthotics/Lines/Etc:   · O2 Device: Nasal cannula  Treatment/Session Assessment:    · Response to Treatment:  eval only   · Interdisciplinary Collaboration:   o Occupational Therapist  o Registered Nurse  · After treatment position/precautions:   o Up in chair  o Bed/Chair-wheels locked  o Call light within reach  o RN notified  o Family at bedside   · Compliance with Program/Exercises: compliant all of the time. · Recommendations/Intent for next treatment session: \"Next visit will focus on advancements to more challenging activities and reduction in assistance provided\".   Total Treatment Duration:  OT Patient Time In/Time Out  Time In: 4075  Time Out: 11 [facility] Composer

## 2024-04-26 NOTE — OCCUPATIONAL THERAPY INITIAL EVALUATION ADULT - MODIFIED CLINICAL TEST OF SENSORY INTEGRATION IN BALANCE TEST
Pt sat EOB ~10 minutes unsupported with good balance. Pt performed STS independently. Pt ambulated in the hallway ~[id number] independently with no LOB, pt with mild unsteady gate, wife notes this is his baseline due to previous injuries.

## 2024-04-26 NOTE — OCCUPATIONAL THERAPY INITIAL EVALUATION ADULT - PERTINENT HX OF CURRENT PROBLEM, REHAB EVAL
68y Male with PMHx of epilepsy on Phenobarbital, HTN, HLD, bipolar disorder on lamictal, CVA in 8/2023 (presented with mixed aphasia s/p TNK, negative MRI post TNK), coming in with difficulty walking. Pt states that yesterday he was very worried because his wife had a CT scan and there was a suspicious nodule. He couldn't sleep at night and had forgotten to take his nighttime meds. He said he then remembered to take his meds at 3 am, so took all of them including trazodone. He woke up at his usual time at 6:30am, felt relatively normal then and made breakfast. He states a little after 8AM he felt wobbly, like his balance was off. He decided to leave and go to the office anyway, and when he got there he felt his "wobbliness" got worse and he fell (but caught himself). He then went to [facility], and then was transferred to [place].  He states he still feels wobbly when he walks. He notes that he had a pelvic fracture so his left leg is a little more wobbly than his right at baseline.

## 2024-04-26 NOTE — DISCHARGE NOTE PROVIDER - HOSPITAL COURSE
Hospital course:  68y Male with PMHx of epilepsy on Phenobarbital, HTN, HLD, bipolar disorder on lamictal, CVA in 8/2023 (presented with mixed aphasia s/p TNK, negative MRI post TNK; reports compliance with ASA 81mg daily), coming in with difficulty walking. Patient reports couldn't sleep at night and had forgotten to take his nighttime meds. He said he then remembered to take his meds at 3 am, so took all of them including trazodone. He woke up at his usual time at 6:30am, felt relatively normal then and made breakfast. He states a little after 8AM he felt wobbly, like his balance was off. He decided to leave and go to the office anyway, and when he got there he felt his "wobbliness" got worse and he fell (but caught himself). He then went to [facility], and then was transferred to [place].  He states he still feels wobbly when he walks. He notes that he had a pelvic fracture over a year ago so his left leg is a little more wobbly than his right at baseline.   CT, CTA head and neck unremarkable. CT perfusion with 8mL mismatch in the right anterior temporal and and left frontal lobe. TTE, COY performed and unchanged from prior studies. ILR interrogated by EP and without any atrial events. MRI shows ***************. Aspirin accumetrics ********************    During this hospital course, patient had a (ischemic/hemorrhagic) stroke located in (left/right.....) as seen on (MRI/CT).   The stroke etiology is likely secondary to:  []atrial fibrillation  []small vessel disease from atherosclerotic risk factors  []other:  []etiology workup still in progress    Patient had the following workup done in house:  CT Head: Negative  MR Head Non Contrast:  CT Angio Head: Negative  CT Angio Neck: Negative   [x]echo -  1. Normal left ventricular size and systolic function.   2. Mild symmetric left ventricular hypertrophy.   3. Normal right ventricular size and systolic function.   4. Normal atria.   5. Injection of agitated saline via a peripheral vein reveals no   evidence of a right-to-left shunt.   6. Mild aortic regurgitation.   7. Aortic sclerosis without significant stenosis.   8. Systolic anterior motion of the mitral valve is seen without evidence   of LVOT obstruction.   9. No pericardial effusion.  10. Mildly dilated ascending aorta.  11. Compared to the previous TTE performed on 8/23/2023, there have been   no significant interval changes.  [x] COY:   1. Normal left and right ventricular cavity size and systolic function,   LV EF 60%.   2. Mild symmetric left ventricular hypertrophy.   3. Normal left and right atrial size.   4. No LA/RA/JUAN C/RAA thrombus seen.   5. No evidence of an intracardiac shunt.   6. Aortic sclerosis without significant stenosis.   7. No pericardial effusion.   8. No cardiac source of systemic embolism detected.   9. Compared to the previous TTE performed on 4/26/2026, there have been   no significant interval changes.    [x]labs - A1C: *****, LDL: 90, TSH: 1.910  []other    Physical exam at discharge:    New medications on discharge:  Labs to be followed up:  Imaging to be done as outpatient:  Further outpatient workup:   Hospital course:  68y Male with PMHx of epilepsy on Phenobarbital, HTN, HLD, bipolar disorder on lamictal, CVA in 8/2023 (presented with mixed aphasia s/p TNK, negative MRI post TNK; reports compliance with ASA 81mg daily), coming in with difficulty walking. Patient reports couldn't sleep at night and had forgotten to take his nighttime meds. He said he then remembered to take his meds at 3 am, so took all of them including trazodone. He woke up at his usual time at 6:30am, felt relatively normal then and made breakfast. He states a little after 8AM he felt wobbly, like his balance was off. He decided to leave and go to the office anyway, and when he got there he felt his "wobbliness" got worse and he fell (but caught himself). He then went to [facility], and then was transferred to [place].  He states he still feels wobbly when he walks. He notes that he had a pelvic fracture over a year ago so his left leg is a little more wobbly than his right at baseline.   CT, CTA head and neck unremarkable. CT perfusion with 8mL mismatch in the right anterior temporal and and left frontal lobe. TTE, COY performed and unchanged from prior studies. ILR interrogated by EP and without any atrial events. MRI shows with no acute infarcts. Aspirin accumetrics ********************Symptoms likely due to polypharmacy.     During this hospital course, patient had no evidence of stroke on MRI.     Patient had the following workup done in house:  CT Head: Negative  MR Head Non Contrast:  No acute infarction.  CT Angio Head: Negative  CT Angio Neck: Negative     echo   1. Normal left ventricular size and systolic function.   2. Mild symmetric left ventricular hypertrophy.   3. Normal right ventricular size and systolic function.   4. Normal atria.   5. Injection of agitated saline via a peripheral vein reveals no   evidence of a right-to-left shunt.   6. Mild aortic regurgitation.   7. Aortic sclerosis without significant stenosis.   8. Systolic anterior motion of the mitral valve is seen without evidence   of LVOT obstruction.   9. No pericardial effusion.  10. Mildly dilated ascending aorta.  11. Compared to the previous TTE performed on 8/23/2023, there have been   no significant interval changes.    COY:   1. Normal left and right ventricular cavity size and systolic function,   LV EF 60%.   2. Mild symmetric left ventricular hypertrophy.   3. Normal left and right atrial size.   4. No LA/RA/JUAN C/RAA thrombus seen.   5. No evidence of an intracardiac shunt.   6. Aortic sclerosis without significant stenosis.   7. No pericardial effusion.   8. No cardiac source of systemic embolism detected.   9. Compared to the previous TTE performed on 4/26/2026, there have been   no significant interval changes.    labs - A1C: *****, LDL: 90, TSH: 1.910    Physical exam at discharge:    New medications on discharge:  Labs to be followed up:  Imaging to be done as outpatient:  Further outpatient workup:   Hospital course:  68y Male with PMHx of epilepsy on Phenobarbital, HTN, HLD, bipolar disorder on lamictal, CVA in 8/2023 (presented with mixed aphasia s/p TNK, negative MRI post TNK; reports compliance with ASA 81mg daily), coming in with difficulty walking. Patient reports couldn't sleep at night and had forgotten to take his nighttime meds. He said he then remembered to take his meds at 3 am, so took all of them including trazodone. He woke up at his usual time at 6:30am, felt relatively normal then and made breakfast. He states a little after 8AM he felt wobbly, like his balance was off. He decided to leave and go to the office anyway, and when he got there he felt his "wobbliness" got worse and he fell (but caught himself). He then went to [facility], and then was transferred to [place].  He states he still feels wobbly when he walks. He notes that he had a pelvic fracture over a year ago so his left leg is a little more wobbly than his right at baseline.   CT, CTA head and neck unremarkable. CT perfusion with 8mL mismatch in the right anterior temporal and and left frontal lobe. TTE, COY performed and unchanged from prior studies. ILR interrogated by EP and without any atrial events. MRI shows with no acute infarcts. Symptoms likely due to polypharmacy.     During this hospital course, patient had no evidence of stroke on MRI.     Patient had the following workup done in house:  CT Head: Negative  MR Head Non Contrast:  No acute infarction.  CT Angio Head: Negative  CT Angio Neck: Negative     echo   1. Normal left ventricular size and systolic function.   2. Mild symmetric left ventricular hypertrophy.   3. Normal right ventricular size and systolic function.   4. Normal atria.   5. Injection of agitated saline via a peripheral vein reveals no   evidence of a right-to-left shunt.   6. Mild aortic regurgitation.   7. Aortic sclerosis without significant stenosis.   8. Systolic anterior motion of the mitral valve is seen without evidence   of LVOT obstruction.   9. No pericardial effusion.  10. Mildly dilated ascending aorta.  11. Compared to the previous TTE performed on 8/23/2023, there have been   no significant interval changes.    COY:   1. Normal left and right ventricular cavity size and systolic function,   LV EF 60%.   2. Mild symmetric left ventricular hypertrophy.   3. Normal left and right atrial size.   4. No LA/RA/JUAN C/RAA thrombus seen.   5. No evidence of an intracardiac shunt.   6. Aortic sclerosis without significant stenosis.   7. No pericardial effusion.   8. No cardiac source of systemic embolism detected.   9. Compared to the previous TTE performed on 4/26/2026, there have been   no significant interval changes.    labs - LDL: 90, TSH: 1.910    Physical exam at discharge:   -Mental status: Awake, alert, oriented to person, age, and month. Speech is fluent with intact naming, repetition, and comprehension, no dysarthria. Recent and remote memory intact. Follows commands. Attention/concentration intact. Fund of knowledge appropriate.  -Cranial nerves:   II: Visual fields are full to confrontation.  III, IV, VI: Extraocular movements are intact without nystagmus. Pupils equally round and reactive to light  V:  Facial sensation V1-V3 equal and intact   VII: Face is symmetric  Motor: Normal bulk and tone. No pronator drift. Strength bilateral upper extremity 5/5, bilateral lower extremities 5/5.  Sensation: Intact to light touch bilaterally. No neglect or extinction on double simultaneous testing.  Coordination: +dysmetria on left finger-to-nose and heel-to-shin, right side intact    New medications on discharge: none  Labs to be followed up:  Imaging to be done as outpatient:  Further outpatient workup: none

## 2024-04-26 NOTE — DISCHARGE NOTE PROVIDER - NSDCCPCAREPLAN_GEN_ALL_CORE_FT
PRINCIPAL DISCHARGE DIAGNOSIS  Diagnosis: Polypharmacy  Assessment and Plan of Treatment: Your symptoms were initially concerning for a stroke in your balance centers in your brain. The MRI was negative for any strokes. Your symptoms were likely due to taking all your nightly medications all at once along with your trazadone. Please be sure to take your medications at the scheduled time.

## 2024-04-26 NOTE — PROGRESS NOTE ADULT - ASSESSMENT
68y Male with PMHx of epilepsy on Phenobarbital, HTN, HLD, bipolar disorder on lamictal, CVA in 8/2023 (presented with mixed aphasia s/p TNK, negative MRI post TNK), coming in with difficulty walking. CT scan negative for stroke, cTA with no stenosis. CTP with mismatch in the right anterior temporal lobe and left frontal lobe, though limited by motion. Exam significant for left sided dysmetria. Admitted for stroke workup.    Neuro  #CVA workup  - continue aspirin 81mg and plavix 75mg daily  - patient reports he has been compliant with ASA 81mg daily since his last admission; check accumetrics on 4/27  - continue atorvastatin 80mg daily  - q4hr stroke neuro checks and vitals  - obtain MRI Brain without contrast  - Stroke Code HCT Results: neg  - Stroke Code CTA Results: neg  - Stroke education    #epilepsy  -continue home phenobarbital    Cards  #HTN  - Goal -180  - hold home blood pressure medication for now  - TTE without interval changes from 8/23; mild LVH, aortic sclerosis, mildly dilated ascending aorta, and systolic anterior motion of the mitral valve  - pending COY; patient agreeable    #prior ILR placement  - interrogated by EP on 4/26, no AF events    #HLD  - high dose statin as above in CVA  - LDL results: 90    Pulm  - call provider if SPO2 < 94%    GI  #Nutrition/Fluids/Electrolytes   - replete K<4 and Mg <2  - Diet: DASH    #hyponatremia (Na 127 on admission) - pt did say he drank a lot of water today  -Na noted to be between 128-131 on last admission  - continue to trend    Psych  #bipolar disorder  -continue home lamictal  -continue home fluoxetine  - continue half of home trazodone dose for now (on 50mg at bedtime at home)    Endocrine  #DM  - A1C results: pending    - TSH results: 1.910  -continue home synthroid    DVT Prophylaxis  - lovenox sq for DVT prophylaxis   - SCDs for DVT prophylaxis     Pt discussed during rounds with Dr. uLma PROCTOR Goals: Goals reviewed at interdisciplinary rounds with case management, social work, physical therapy, occupational therapy, and speech language pathology.      Discussed daily hospital plans and goals with patient

## 2024-04-26 NOTE — PHYSICAL THERAPY INITIAL EVALUATION ADULT - ADDITIONAL COMMENTS
Pt currently resides in 5th floor apartment walkup w/ wife. Owns SC however primarily amb indep w/o AD. Indep w/ ADL tasks prior to [facility] admission. Pt is right hand dominant.

## 2024-04-26 NOTE — OCCUPATIONAL THERAPY INITIAL EVALUATION ADULT - DIAGNOSIS, OT EVAL
Pt admitted for increasing "wobbly" gate, however symptoms have since resided. MRS 0. Pt with no current neurological or physical symptoms impacting ADLs or functional mobility at this time, pt demonstrates safe and independent performance of ADLs and functional mobility with no AD and is discharged from skilled OT at this time,

## 2024-04-27 VITALS
RESPIRATION RATE: 12 BRPM | SYSTOLIC BLOOD PRESSURE: 131 MMHG | OXYGEN SATURATION: 99 % | HEART RATE: 68 BPM | DIASTOLIC BLOOD PRESSURE: 60 MMHG

## 2024-04-27 PROCEDURE — 99238 HOSP IP/OBS DSCHRG MGMT 30/<: CPT

## 2024-04-27 NOTE — DISCHARGE NOTE NURSING/CASE MANAGEMENT/SOCIAL WORK - NSDCPEFALRISK_GEN_ALL_CORE
For information on Fall & Injury Prevention, visit: [email or web address] OR  [email or web address] OR  https://www.cdc.gov/steadi/patient.html

## 2024-04-27 NOTE — DISCHARGE NOTE NURSING/CASE MANAGEMENT/SOCIAL WORK - PATIENT PORTAL LINK FT
You can access the FollowMyHealth Patient Portal offered by [facility] by registering at the following website: [email or web address]. By joining [facility]’s FollowMyHealth portal, you will also be able to view your health information using other applications (apps) compatible with our system.

## 2024-05-02 DIAGNOSIS — T50.911A POISONING BY MULTIPLE UNSPECIFIED DRUGS, MEDICAMENTS AND BIOLOGICAL SUBSTANCES, ACCIDENTAL (UNINTENTIONAL), INITIAL ENCOUNTER: ICD-10-CM

## 2024-05-02 DIAGNOSIS — E03.9 HYPOTHYROIDISM, UNSPECIFIED: ICD-10-CM

## 2024-05-02 DIAGNOSIS — T50.901A POISONING BY UNSPECIFIED DRUGS, MEDICAMENTS AND BIOLOGICAL SUBSTANCES, ACCIDENTAL (UNINTENTIONAL), INITIAL ENCOUNTER: ICD-10-CM

## 2024-05-02 DIAGNOSIS — F31.9 BIPOLAR DISORDER, UNSPECIFIED: ICD-10-CM

## 2024-05-02 DIAGNOSIS — E87.1 HYPO-OSMOLALITY AND HYPONATREMIA: ICD-10-CM

## 2024-05-02 DIAGNOSIS — E78.5 HYPERLIPIDEMIA, UNSPECIFIED: ICD-10-CM

## 2024-05-02 DIAGNOSIS — R27.8 OTHER LACK OF COORDINATION: ICD-10-CM

## 2024-05-02 DIAGNOSIS — Z88.0 ALLERGY STATUS TO PENICILLIN: ICD-10-CM

## 2024-05-02 DIAGNOSIS — I10 ESSENTIAL (PRIMARY) HYPERTENSION: ICD-10-CM

## 2024-05-02 DIAGNOSIS — E11.9 TYPE 2 DIABETES MELLITUS WITHOUT COMPLICATIONS: ICD-10-CM

## 2024-05-02 DIAGNOSIS — Z45.09 ENCOUNTER FOR ADJUSTMENT AND MANAGEMENT OF OTHER CARDIAC DEVICE: ICD-10-CM

## 2024-05-02 DIAGNOSIS — I69.311 MEMORY DEFICIT FOLLOWING CEREBRAL INFARCTION: ICD-10-CM

## 2024-05-02 DIAGNOSIS — Z79.82 LONG TERM (CURRENT) USE OF ASPIRIN: ICD-10-CM

## 2024-05-02 DIAGNOSIS — R26.81 UNSTEADINESS ON FEET: ICD-10-CM

## 2024-05-02 DIAGNOSIS — Z95.818 PRESENCE OF OTHER CARDIAC IMPLANTS AND GRAFTS: ICD-10-CM

## 2024-05-02 DIAGNOSIS — G40.909 EPILEPSY, UNSPECIFIED, NOT INTRACTABLE, WITHOUT STATUS EPILEPTICUS: ICD-10-CM

## 2024-05-02 DIAGNOSIS — Z79.890 HORMONE REPLACEMENT THERAPY: ICD-10-CM

## 2024-05-02 DIAGNOSIS — Z85.46 PERSONAL HISTORY OF MALIGNANT NEOPLASM OF PROSTATE: ICD-10-CM

## 2024-05-24 ENCOUNTER — APPOINTMENT (OUTPATIENT)
Dept: HEART AND VASCULAR | Facility: CLINIC | Age: 69
End: 2024-05-24
Payer: MEDICARE

## 2024-05-24 ENCOUNTER — NON-APPOINTMENT (OUTPATIENT)
Age: 69
End: 2024-05-24

## 2024-05-24 PROBLEM — I63.9 CEREBRAL INFARCTION, UNSPECIFIED: Chronic | Status: ACTIVE | Noted: 2024-04-25

## 2024-05-24 PROCEDURE — 93298 REM INTERROG DEV EVAL SCRMS: CPT

## 2024-06-10 ENCOUNTER — APPOINTMENT (OUTPATIENT)
Dept: HEART AND VASCULAR | Facility: CLINIC | Age: 69
End: 2024-06-10

## 2024-07-08 ENCOUNTER — APPOINTMENT (OUTPATIENT)
Dept: HEART AND VASCULAR | Facility: CLINIC | Age: 69
End: 2024-07-08
Payer: MEDICARE

## 2024-07-08 ENCOUNTER — NON-APPOINTMENT (OUTPATIENT)
Age: 69
End: 2024-07-08

## 2024-07-08 VITALS
SYSTOLIC BLOOD PRESSURE: 135 MMHG | RESPIRATION RATE: 16 BRPM | HEIGHT: 64 IN | HEART RATE: 71 BPM | WEIGHT: 142 LBS | TEMPERATURE: 97 F | BODY MASS INDEX: 24.24 KG/M2 | OXYGEN SATURATION: 95 % | DIASTOLIC BLOOD PRESSURE: 70 MMHG

## 2024-07-08 PROCEDURE — 93285 PRGRMG DEV EVAL SCRMS IP: CPT

## 2024-08-12 ENCOUNTER — NON-APPOINTMENT (OUTPATIENT)
Age: 69
End: 2024-08-12

## 2024-08-12 ENCOUNTER — APPOINTMENT (OUTPATIENT)
Dept: HEART AND VASCULAR | Facility: CLINIC | Age: 69
End: 2024-08-12
Payer: MEDICARE

## 2024-08-12 PROCEDURE — 93298 REM INTERROG DEV EVAL SCRMS: CPT

## 2024-09-16 ENCOUNTER — NON-APPOINTMENT (OUTPATIENT)
Age: 69
End: 2024-09-16

## 2024-09-16 ENCOUNTER — APPOINTMENT (OUTPATIENT)
Dept: HEART AND VASCULAR | Facility: CLINIC | Age: 69
End: 2024-09-16
Payer: MEDICARE

## 2024-09-16 PROCEDURE — 93298 REM INTERROG DEV EVAL SCRMS: CPT

## 2024-09-16 NOTE — ED ADULT NURSE NOTE - NSFALLRISKASMT_ED_ALL_ED_DT
Health Maintenance       Shingles Vaccine (2 of 3)  Overdue since 12/27/2012    DM/CKD Microalbumin Ratio (Yearly)  Overdue since 4/20/2024    COVID-19 Vaccine (4 - 2023-24 season)  Overdue since 9/1/2024    Influenza Vaccine (1)  Due since 9/1/2024    Traditional Medicare- Medicare Wellness Visit (Yearly)  Due soon on 10/1/2024           Following review of the above:  Patient is not proceeding with: DM/CKD Microalbumin, COVID-19, Influenza, and Shingles    Note: Refer to final orders and clinician documentation.       25-Apr-2024 14:55

## 2024-10-17 ENCOUNTER — APPOINTMENT (OUTPATIENT)
Dept: HEART AND VASCULAR | Facility: CLINIC | Age: 69
End: 2024-10-17

## 2024-10-17 PROCEDURE — 93298 REM INTERROG DEV EVAL SCRMS: CPT

## 2024-11-21 ENCOUNTER — APPOINTMENT (OUTPATIENT)
Dept: HEART AND VASCULAR | Facility: CLINIC | Age: 69
End: 2024-11-21

## 2024-11-21 PROCEDURE — 93298 REM INTERROG DEV EVAL SCRMS: CPT

## 2024-12-26 ENCOUNTER — APPOINTMENT (OUTPATIENT)
Dept: HEART AND VASCULAR | Facility: CLINIC | Age: 69
End: 2024-12-26

## 2024-12-26 ENCOUNTER — NON-APPOINTMENT (OUTPATIENT)
Age: 69
End: 2024-12-26

## 2024-12-26 PROCEDURE — 93298 REM INTERROG DEV EVAL SCRMS: CPT

## 2025-01-22 ENCOUNTER — APPOINTMENT (OUTPATIENT)
Dept: HEART AND VASCULAR | Facility: CLINIC | Age: 70
End: 2025-01-22
Payer: MEDICARE

## 2025-01-22 ENCOUNTER — NON-APPOINTMENT (OUTPATIENT)
Age: 70
End: 2025-01-22

## 2025-01-22 VITALS
WEIGHT: 145 LBS | HEART RATE: 81 BPM | SYSTOLIC BLOOD PRESSURE: 144 MMHG | BODY MASS INDEX: 24.75 KG/M2 | TEMPERATURE: 97.8 F | OXYGEN SATURATION: 96 % | DIASTOLIC BLOOD PRESSURE: 80 MMHG | HEIGHT: 64 IN

## 2025-01-22 DIAGNOSIS — Z95.818 PRESENCE OF OTHER CARDIAC IMPLANTS AND GRAFTS: ICD-10-CM

## 2025-01-22 PROCEDURE — 93285 PRGRMG DEV EVAL SCRMS IP: CPT

## 2025-02-19 ENCOUNTER — NON-APPOINTMENT (OUTPATIENT)
Age: 70
End: 2025-02-19

## 2025-02-19 ENCOUNTER — APPOINTMENT (OUTPATIENT)
Dept: HEART AND VASCULAR | Facility: CLINIC | Age: 70
End: 2025-02-19
Payer: MEDICARE

## 2025-02-19 PROCEDURE — 93298 REM INTERROG DEV EVAL SCRMS: CPT

## 2025-02-20 ENCOUNTER — EMERGENCY (EMERGENCY)
Facility: HOSPITAL | Age: 70
LOS: 1 days | Discharge: HOME | End: 2025-02-20
Attending: EMERGENCY MEDICINE | Admitting: EMERGENCY MEDICINE
Payer: MEDICARE

## 2025-02-20 VITALS
DIASTOLIC BLOOD PRESSURE: 58 MMHG | HEART RATE: 79 BPM | SYSTOLIC BLOOD PRESSURE: 141 MMHG | RESPIRATION RATE: 18 BRPM | TEMPERATURE: 98 F | OXYGEN SATURATION: 98 %

## 2025-02-20 VITALS
SYSTOLIC BLOOD PRESSURE: 156 MMHG | DIASTOLIC BLOOD PRESSURE: 88 MMHG | RESPIRATION RATE: 16 BRPM | HEART RATE: 76 BPM | OXYGEN SATURATION: 97 % | TEMPERATURE: 98 F

## 2025-02-20 DIAGNOSIS — E78.00 PURE HYPERCHOLESTEROLEMIA, UNSPECIFIED: ICD-10-CM

## 2025-02-20 DIAGNOSIS — E87.1 HYPO-OSMOLALITY AND HYPONATREMIA: ICD-10-CM

## 2025-02-20 LAB
ALBUMIN SERPL ELPH-MCNC: 4 G/DL — SIGNIFICANT CHANGE UP (ref 3.4–5)
ALP SERPL-CCNC: 110 U/L — SIGNIFICANT CHANGE UP (ref 40–120)
ALT FLD-CCNC: 27 U/L — SIGNIFICANT CHANGE UP (ref 12–42)
ANION GAP SERPL CALC-SCNC: 4 MMOL/L — LOW (ref 9–16)
ANION GAP SERPL CALC-SCNC: 5 MMOL/L — LOW (ref 9–16)
APPEARANCE UR: CLEAR — SIGNIFICANT CHANGE UP
AST SERPL-CCNC: 21 U/L — SIGNIFICANT CHANGE UP (ref 15–37)
BILIRUB SERPL-MCNC: 0.3 MG/DL — SIGNIFICANT CHANGE UP (ref 0.2–1.2)
BILIRUB UR-MCNC: NEGATIVE — SIGNIFICANT CHANGE UP
BUN SERPL-MCNC: 10 MG/DL — SIGNIFICANT CHANGE UP (ref 7–23)
BUN SERPL-MCNC: 12 MG/DL — SIGNIFICANT CHANGE UP (ref 7–23)
CALCIUM SERPL-MCNC: 8.3 MG/DL — LOW (ref 8.5–10.5)
CALCIUM SERPL-MCNC: 8.4 MG/DL — LOW (ref 8.5–10.5)
CHLORIDE SERPL-SCNC: 95 MMOL/L — LOW (ref 96–108)
CHLORIDE SERPL-SCNC: 99 MMOL/L — SIGNIFICANT CHANGE UP (ref 96–108)
CO2 SERPL-SCNC: 29 MMOL/L — SIGNIFICANT CHANGE UP (ref 22–31)
CO2 SERPL-SCNC: 34 MMOL/L — HIGH (ref 22–31)
COLOR SPEC: YELLOW — SIGNIFICANT CHANGE UP
CREAT SERPL-MCNC: 0.87 MG/DL — SIGNIFICANT CHANGE UP (ref 0.5–1.3)
CREAT SERPL-MCNC: 0.91 MG/DL — SIGNIFICANT CHANGE UP (ref 0.5–1.3)
DIFF PNL FLD: NEGATIVE — SIGNIFICANT CHANGE UP
EGFR: 90 ML/MIN/1.73M2 — SIGNIFICANT CHANGE UP
EGFR: 92 ML/MIN/1.73M2 — SIGNIFICANT CHANGE UP
FLUAV AG NPH QL: SIGNIFICANT CHANGE UP
FLUBV AG NPH QL: SIGNIFICANT CHANGE UP
GLUCOSE SERPL-MCNC: 118 MG/DL — HIGH (ref 70–99)
GLUCOSE SERPL-MCNC: 95 MG/DL — SIGNIFICANT CHANGE UP (ref 70–99)
GLUCOSE UR QL: NEGATIVE MG/DL — SIGNIFICANT CHANGE UP
HCT VFR BLD CALC: 34.7 % — LOW (ref 39–50)
HGB BLD-MCNC: 11.5 G/DL — LOW (ref 13–17)
KETONES UR-MCNC: NEGATIVE MG/DL — SIGNIFICANT CHANGE UP
LEUKOCYTE ESTERASE UR-ACNC: NEGATIVE — SIGNIFICANT CHANGE UP
MAGNESIUM SERPL-MCNC: 1.9 MG/DL — SIGNIFICANT CHANGE UP (ref 1.6–2.6)
MCHC RBC-ENTMCNC: 28.7 PG — SIGNIFICANT CHANGE UP (ref 27–34)
MCHC RBC-ENTMCNC: 33.1 G/DL — SIGNIFICANT CHANGE UP (ref 32–36)
MCV RBC AUTO: 86.5 FL — SIGNIFICANT CHANGE UP (ref 80–100)
NITRITE UR-MCNC: NEGATIVE — SIGNIFICANT CHANGE UP
NRBC # BLD AUTO: 0 K/UL — SIGNIFICANT CHANGE UP (ref 0–0)
NRBC # FLD: 0 K/UL — SIGNIFICANT CHANGE UP (ref 0–0)
NRBC BLD AUTO-RTO: 0 /100 WBCS — SIGNIFICANT CHANGE UP (ref 0–0)
PH UR: 7.5 — SIGNIFICANT CHANGE UP (ref 5–8)
PLATELET # BLD AUTO: 268 K/UL — SIGNIFICANT CHANGE UP (ref 150–400)
PMV BLD: 9 FL — SIGNIFICANT CHANGE UP (ref 7–13)
POTASSIUM SERPL-MCNC: 4 MMOL/L — SIGNIFICANT CHANGE UP (ref 3.5–5.3)
POTASSIUM SERPL-MCNC: 4.4 MMOL/L — SIGNIFICANT CHANGE UP (ref 3.5–5.3)
POTASSIUM SERPL-SCNC: 4 MMOL/L — SIGNIFICANT CHANGE UP (ref 3.5–5.3)
POTASSIUM SERPL-SCNC: 4.4 MMOL/L — SIGNIFICANT CHANGE UP (ref 3.5–5.3)
PROT SERPL-MCNC: 6.5 G/DL — SIGNIFICANT CHANGE UP (ref 6.4–8.2)
PROT UR-MCNC: NEGATIVE MG/DL — SIGNIFICANT CHANGE UP
RBC # BLD: 4.01 M/UL — LOW (ref 4.2–5.8)
RBC # FLD: 13.3 % — SIGNIFICANT CHANGE UP (ref 10.3–14.5)
RSV RNA NPH QL NAA+NON-PROBE: SIGNIFICANT CHANGE UP
SARS-COV-2 RNA SPEC QL NAA+PROBE: SIGNIFICANT CHANGE UP
SODIUM SERPL-SCNC: 128 MMOL/L — LOW (ref 132–145)
SODIUM SERPL-SCNC: 138 MMOL/L — SIGNIFICANT CHANGE UP (ref 132–145)
SP GR SPEC: 1 — LOW (ref 1–1.03)
UROBILINOGEN FLD QL: 0.2 MG/DL — SIGNIFICANT CHANGE UP (ref 0.2–1)
WBC # BLD: 9.41 K/UL — SIGNIFICANT CHANGE UP (ref 3.8–10.5)
WBC # FLD AUTO: 9.41 K/UL — SIGNIFICANT CHANGE UP (ref 3.8–10.5)

## 2025-02-20 PROCEDURE — 99285 EMERGENCY DEPT VISIT HI MDM: CPT

## 2025-02-20 PROCEDURE — 70450 CT HEAD/BRAIN W/O DYE: CPT | Mod: 26

## 2025-02-20 RX ORDER — BACTERIOSTATIC SODIUM CHLORIDE 0.9 %
1000 VIAL (ML) INJECTION
Refills: 0 | Status: DISCONTINUED | OUTPATIENT
Start: 2025-02-20 | End: 2025-02-24

## 2025-02-20 RX ADMIN — Medication 250 MILLILITER(S): at 16:17

## 2025-02-20 NOTE — ED PROVIDER NOTE - PATIENT PORTAL LINK FT
You can access the FollowMyHealth Patient Portal offered by [facility] by registering at the following website: [email or web address]. By joining [employer] [facility]’s FollowMyHealth portal, you will also be able to view your health information using other applications (apps) compatible with our system.

## 2025-02-20 NOTE — ED PROVIDER NOTE - CLINICAL SUMMARY MEDICAL DECISION MAKING FREE TEXT BOX
69-year-old male with remote history of prostate cancer, Seizures on phenobarbital, Hypertension, high cholesterol, hypothyroid. Presents to the ED with complaint of generalized weakness fatigue and feeling ill at work.  Patient was in his usual state of health walking to work and said he felt "off balance".  He did not fall, he had no head strike or loss of consciousness.  He reports that he did not feel dizzy or lightheaded just "off balance".  No limb weakness, he states he has had episodes of low sodium in the past and this could be a reason that he feels this way.  No recent illness, no sick contacts, no fever/chills.  Tolerating p.o. with normal appetite, no insomnia, no recent stressors or other illnesses.    VSS in NAD non toxic appearing   NCAT EOMI PERRL OP clear  heart RRR no murmur   lungs CTA no wheezing no rales no rhonchi   abd soft NT ND no CVAT no guarding no rebound   normal neuro exam CN I-XII grossly intact, no groos motor or sensory deficits  no peripheral c/c/e  NIHSS 0    A/P: Generalized weakness, initial labs showed hyponatremia, but then subsequent lab finding with normal sodium.  So I think this was a lab error.  After small amount of IV fluids, patient tolerated p.o., feeling better, ambulatory with steady gait.  Labs otherwise unremarkable.  CT head with no acute pathology.  Stable for DC home with outpatient follow-up.  Patient also has a history of seizures, takes phenobarbital and has a neurologist for follow-up.  He had an MRI approximately 1 year ago, I recommended that he see his neurologist for another MRI for further evaluation of the dizziness symptoms.  Stable for DC home.

## 2025-02-20 NOTE — ED ADULT TRIAGE NOTE - CHIEF COMPLAINT QUOTE
BIBEMS from work. Pt states he has been dealing with episodes of "losing his balance" x months, Pt is seeing neurologist. Pt states "lost his balance today" and fell down. Denies hs or loc.

## 2025-02-20 NOTE — ED ADULT NURSE NOTE - PRO INTERPRETER NEED 2
Writer spoke with pts daughter Halle Staples" again to update her about the pt not having a cuff leak at this time. English

## 2025-02-20 NOTE — ED ADULT NURSE NOTE - NSFALLCONCLUSION_ED_ALL_ED
Addended by: LENORA LOYA on: 7/20/2022 02:27 PM     Modules accepted: Orders     Universal Safety Interventions

## 2025-02-20 NOTE — ED ADULT NURSE NOTE - OBJECTIVE STATEMENT
Received patient alert and oriented x 4 denies chest pain or SOB, no cardiac or respiratory distress noted. Patient stated that reason for ER visit is due to loosing balance today at work although patient does admit to this happening for a while now and this is not the first time. Upon assessment patient does not have loss of sensation bilaterally in upper or lower extremities, patient facial expression is symmetrical, denies blurry vison. Patient is currently stable placed on cardiac monitor , monitoring ongoing.

## 2025-02-24 DIAGNOSIS — R53.1 WEAKNESS: ICD-10-CM

## 2025-02-24 DIAGNOSIS — E03.9 HYPOTHYROIDISM, UNSPECIFIED: ICD-10-CM

## 2025-02-24 DIAGNOSIS — Z85.46 PERSONAL HISTORY OF MALIGNANT NEOPLASM OF PROSTATE: ICD-10-CM

## 2025-02-24 DIAGNOSIS — I10 ESSENTIAL (PRIMARY) HYPERTENSION: ICD-10-CM

## 2025-02-24 DIAGNOSIS — E78.5 HYPERLIPIDEMIA, UNSPECIFIED: ICD-10-CM

## 2025-02-24 DIAGNOSIS — Z88.0 ALLERGY STATUS TO PENICILLIN: ICD-10-CM

## 2025-03-24 ENCOUNTER — NON-APPOINTMENT (OUTPATIENT)
Age: 70
End: 2025-03-24

## 2025-03-24 ENCOUNTER — APPOINTMENT (OUTPATIENT)
Dept: HEART AND VASCULAR | Facility: CLINIC | Age: 70
End: 2025-03-24
Payer: MEDICARE

## 2025-03-24 PROCEDURE — 93298 REM INTERROG DEV EVAL SCRMS: CPT

## 2025-04-28 ENCOUNTER — NON-APPOINTMENT (OUTPATIENT)
Age: 70
End: 2025-04-28

## 2025-04-28 ENCOUNTER — APPOINTMENT (OUTPATIENT)
Dept: HEART AND VASCULAR | Facility: CLINIC | Age: 70
End: 2025-04-28
Payer: MEDICARE

## 2025-04-28 PROCEDURE — 93298 REM INTERROG DEV EVAL SCRMS: CPT

## 2025-05-29 ENCOUNTER — NON-APPOINTMENT (OUTPATIENT)
Age: 70
End: 2025-05-29

## 2025-05-29 ENCOUNTER — APPOINTMENT (OUTPATIENT)
Dept: HEART AND VASCULAR | Facility: CLINIC | Age: 70
End: 2025-05-29
Payer: MEDICARE

## 2025-05-29 PROCEDURE — 93298 REM INTERROG DEV EVAL SCRMS: CPT

## 2025-06-25 NOTE — ED PROVIDER NOTE - NS ED ATTENDING STATEMENT MOD
Intubation    Date/Time: 6/25/2025 12:53 PM    Performed by: Tracie Penn CRNA  Authorized by: Tracie Penn CRNA    Intubation:     Induction:  Intravenous    Intubated:  Postinduction    Mask Ventilation:  Easy mask    Attempts:  1    Attempted By:  CRNA    Method of Intubation:  Video laryngoscopy    Blade:  [place] 3    Laryngeal View Grade: Grade I - full view of cords      Difficult Airway Encountered?: No      Complications:  None    Airway Device:  Oral endotracheal tube    Airway Device Size:  7.5    Style/Cuff Inflation:  Cuffed (inflated to minimal occlusive pressure)    Tube secured:  23    Placement Verified By:  Capnometry    Complicating Factors:  [place]it[place]    Findings Post-Intubation:  BS equal bilateral and atraumatic/condition of teeth unchanged      
Attending Only

## 2025-07-01 ENCOUNTER — APPOINTMENT (OUTPATIENT)
Dept: HEART AND VASCULAR | Facility: CLINIC | Age: 70
End: 2025-07-01
Payer: MEDICARE

## 2025-07-01 ENCOUNTER — NON-APPOINTMENT (OUTPATIENT)
Age: 70
End: 2025-07-01

## 2025-07-01 PROCEDURE — 93298 REM INTERROG DEV EVAL SCRMS: CPT

## 2025-07-09 NOTE — ED ADULT NURSE NOTE - CHIEF COMPLAINT
Spoke to patient. States she no longer needs appointment.    The patient is a 67y Male complaining of

## 2025-08-13 ENCOUNTER — APPOINTMENT (OUTPATIENT)
Dept: HEART AND VASCULAR | Facility: CLINIC | Age: 70
End: 2025-08-13
Payer: MEDICARE

## 2025-08-13 VITALS
WEIGHT: 140 LBS | DIASTOLIC BLOOD PRESSURE: 72 MMHG | SYSTOLIC BLOOD PRESSURE: 116 MMHG | BODY MASS INDEX: 23.9 KG/M2 | HEIGHT: 64 IN | HEART RATE: 85 BPM | OXYGEN SATURATION: 95 %

## 2025-08-13 DIAGNOSIS — Z95.818 PRESENCE OF OTHER CARDIAC IMPLANTS AND GRAFTS: ICD-10-CM

## 2025-08-13 PROCEDURE — 93285 PRGRMG DEV EVAL SCRMS IP: CPT

## 2025-09-15 ENCOUNTER — NON-APPOINTMENT (OUTPATIENT)
Age: 70
End: 2025-09-15

## 2025-09-15 ENCOUNTER — APPOINTMENT (OUTPATIENT)
Dept: HEART AND VASCULAR | Facility: CLINIC | Age: 70
End: 2025-09-15
Payer: MEDICARE

## 2025-09-15 PROCEDURE — 93298 REM INTERROG DEV EVAL SCRMS: CPT
